# Patient Record
Sex: FEMALE | Race: BLACK OR AFRICAN AMERICAN | Employment: FULL TIME | URBAN - METROPOLITAN AREA
[De-identification: names, ages, dates, MRNs, and addresses within clinical notes are randomized per-mention and may not be internally consistent; named-entity substitution may affect disease eponyms.]

---

## 2019-05-16 ENCOUNTER — HOSPITAL ENCOUNTER (EMERGENCY)
Age: 44
Discharge: HOME | End: 2019-05-16
Attending: EMERGENCY MEDICINE
Payer: OTHER GOVERNMENT

## 2019-05-16 ENCOUNTER — APPOINTMENT (OUTPATIENT)
Dept: ULTRASOUND IMAGING | Age: 44
End: 2019-05-16
Attending: EMERGENCY MEDICINE
Payer: OTHER GOVERNMENT

## 2019-05-16 VITALS
HEART RATE: 69 BPM | RESPIRATION RATE: 14 BRPM | WEIGHT: 140 LBS | SYSTOLIC BLOOD PRESSURE: 126 MMHG | DIASTOLIC BLOOD PRESSURE: 83 MMHG | HEIGHT: 67 IN | OXYGEN SATURATION: 99 % | BODY MASS INDEX: 21.97 KG/M2 | TEMPERATURE: 98.4 F

## 2019-05-16 DIAGNOSIS — O03.9 MISCARRIAGE: Primary | ICD-10-CM

## 2019-05-16 LAB
ALBUMIN SERPL-MCNC: 3.8 G/DL (ref 3.5–5)
ALBUMIN/GLOB SERPL: 0.8 {RATIO} (ref 1.2–3.5)
ALP SERPL-CCNC: 66 U/L (ref 50–136)
ALT SERPL-CCNC: 15 U/L (ref 12–65)
ANION GAP SERPL CALC-SCNC: 6 MMOL/L (ref 7–16)
AST SERPL-CCNC: 11 U/L (ref 15–37)
BACTERIA URNS QL MICRO: 0 /HPF
BASOPHILS # BLD: 0.1 K/UL (ref 0–0.2)
BASOPHILS NFR BLD: 1 % (ref 0–2)
BILIRUB SERPL-MCNC: 0.3 MG/DL (ref 0.2–1.1)
BUN SERPL-MCNC: 12 MG/DL (ref 6–23)
CALCIUM SERPL-MCNC: 9 MG/DL (ref 8.3–10.4)
CASTS URNS QL MICRO: ABNORMAL /LPF
CHLORIDE SERPL-SCNC: 107 MMOL/L (ref 98–107)
CO2 SERPL-SCNC: 28 MMOL/L (ref 21–32)
CREAT SERPL-MCNC: 0.99 MG/DL (ref 0.6–1)
DIFFERENTIAL METHOD BLD: ABNORMAL
EOSINOPHIL # BLD: 0 K/UL (ref 0–0.8)
EOSINOPHIL NFR BLD: 1 % (ref 0.5–7.8)
EPI CELLS #/AREA URNS HPF: ABNORMAL /HPF
ERYTHROCYTE [DISTWIDTH] IN BLOOD BY AUTOMATED COUNT: 11.9 % (ref 11.9–14.6)
GLOBULIN SER CALC-MCNC: 4.5 G/DL (ref 2.3–3.5)
GLUCOSE SERPL-MCNC: 89 MG/DL (ref 65–100)
HCG SERPL-ACNC: 296 MIU/ML (ref 0–6)
HCT VFR BLD AUTO: 39.1 % (ref 35.8–46.3)
HGB BLD-MCNC: 12.8 G/DL (ref 11.7–15.4)
IMM GRANULOCYTES # BLD AUTO: 0 K/UL (ref 0–0.5)
IMM GRANULOCYTES NFR BLD AUTO: 1 % (ref 0–5)
LYMPHOCYTES # BLD: 1.1 K/UL (ref 0.5–4.6)
LYMPHOCYTES NFR BLD: 27 % (ref 13–44)
MCH RBC QN AUTO: 32.4 PG (ref 26.1–32.9)
MCHC RBC AUTO-ENTMCNC: 32.7 G/DL (ref 31.4–35)
MCV RBC AUTO: 99 FL (ref 79.6–97.8)
MONOCYTES # BLD: 0.3 K/UL (ref 0.1–1.3)
MONOCYTES NFR BLD: 6 % (ref 4–12)
NEUTS SEG # BLD: 2.7 K/UL (ref 1.7–8.2)
NEUTS SEG NFR BLD: 65 % (ref 43–78)
NRBC # BLD: 0 K/UL (ref 0–0.2)
PLATELET # BLD AUTO: 250 K/UL (ref 150–450)
PMV BLD AUTO: 10.5 FL (ref 9.4–12.3)
POTASSIUM SERPL-SCNC: 3.8 MMOL/L (ref 3.5–5.1)
PROT SERPL-MCNC: 8.3 G/DL (ref 6.3–8.2)
RBC # BLD AUTO: 3.95 M/UL (ref 4.05–5.2)
RBC #/AREA URNS HPF: >100 /HPF
SODIUM SERPL-SCNC: 141 MMOL/L (ref 136–145)
WBC # BLD AUTO: 4.2 K/UL (ref 4.3–11.1)
WBC URNS QL MICRO: ABNORMAL /HPF

## 2019-05-16 PROCEDURE — 76815 OB US LIMITED FETUS(S): CPT

## 2019-05-16 PROCEDURE — 80053 COMPREHEN METABOLIC PANEL: CPT

## 2019-05-16 PROCEDURE — 99283 EMERGENCY DEPT VISIT LOW MDM: CPT | Performed by: EMERGENCY MEDICINE

## 2019-05-16 PROCEDURE — 87086 URINE CULTURE/COLONY COUNT: CPT

## 2019-05-16 PROCEDURE — 85025 COMPLETE CBC W/AUTO DIFF WBC: CPT

## 2019-05-16 PROCEDURE — 81015 MICROSCOPIC EXAM OF URINE: CPT

## 2019-05-16 PROCEDURE — 84702 CHORIONIC GONADOTROPIN TEST: CPT

## 2019-05-16 NOTE — DISCHARGE INSTRUCTIONS
Patient Education        Miscarriage: Care Instructions  Your Care Instructions    The loss of a pregnancy can be very hard. You may wonder why it happened or blame yourself. Miscarriages are common and are not caused by exercise, stress, or sex. Most happen because the fertilized egg in the uterus does not develop normally. There is no treatment that can stop a miscarriage. As long as you do not have heavy blood loss, fever, weakness, or other signs of infection, you can let a miscarriage follow its own course. This can take several days. Your body will recover over the next several weeks. Having a miscarriage does not mean you cannot have a normal pregnancy in the future. The doctor has checked you carefully, but problems can develop later. If you notice any problems or new symptoms, get medical treatment right away. Follow-up care is a key part of your treatment and safety. Be sure to make and go to all appointments, and call your doctor if you are having problems. It's also a good idea to know your test results and keep a list of the medicines you take. How can you care for yourself at home? · You will probably have some vaginal bleeding for 1 to 2 weeks. It may be similar to or slightly heavier than a normal period. The bleeding should get lighter after a week. Use pads instead of tampons. You may use tampons during your next period, which should start in 3 to 6 weeks. · Take an over-the-counter pain medicine, such as acetaminophen (Tylenol), ibuprofen (Advil, Motrin), or naproxen (Aleve) for cramps. Read and follow all instructions on the label. You may have cramps for several days after the miscarriage. · Do not take two or more pain medicines at the same time unless the doctor told you to. Many pain medicines have acetaminophen, which is Tylenol. Too much acetaminophen (Tylenol) can be harmful. · Use a clear container to save any tissue that you pass.  Take it to your doctor's office as soon as you can.  · Do not have sex until the bleeding stops. · You may return to your normal activities if you feel well enough to do so. But you should avoid heavy exercise until the bleeding stops. · If you plan to get pregnant again, check with your doctor. Most doctors suggest waiting until you have had at least one normal period before you try to get pregnant. · If you do not want to get pregnant, ask your doctor about birth control. You can get pregnant again before your next period starts if you are not using birth control. · You may be low in iron because of blood loss. Eat a balanced diet that is high in iron and vitamin C. Foods rich in iron include red meat, shellfish, eggs, beans, and leafy green vegetables. Foods high in vitamin C include citrus fruits, tomatoes, and broccoli. Talk to your doctor about whether you need to take iron pills or a multivitamin. · The loss of a pregnancy can be very hard. You may wonder why it happened and blame yourself. Talking to family members, friends, a counselor, or your doctor may help you cope with your loss. When should you call for help? Call 911 anytime you think you may need emergency care. For example, call if:    · You passed out (lost consciousness).    Call your doctor now or seek immediate medical care if:    · You have severe vaginal bleeding.     · You are dizzy or lightheaded, or you feel like you may faint.     · You have new or worse pain in your belly or pelvis.     · You have a fever.     · You have vaginal discharge that smells bad.    Watch closely for changes in your health, and be sure to contact your doctor if:    · You do not get better as expected. Where can you learn more? Go to http://abimael-david.info/. Enter E802 in the search box to learn more about \"Miscarriage: Care Instructions. \"  Current as of: September 5, 2018  Content Version: 11.9  © 8182-9222 [employer].  Care instructions adapted under license by [employer] (which disclaims liability or warranty for this information). If you have questions about a medical condition or this instruction, always ask your healthcare professional. [address] any warranty or liability for your use of this information.

## 2019-05-16 NOTE — ED TRIAGE NOTES
Pt presents to triage c/o \"here to get scans done bc she is going through a miscarriage. May 10th she started having vaginal bleeding. Pt uses IVF process. Pt requests [facility] receive

## 2019-05-16 NOTE — ED TRIAGE NOTES
Patient states that she is currently getting IVF treatment. States that she had positive pregnancy test confirmed at [facility]. Patient has been bleed ing 5/10/19. Patient attempted to be seen at [facility] and was told to come to the ER as she is not an established patient. Patient states that she is not currently bleeding heavily. Patient denies CP/SOB.

## 2019-05-16 NOTE — ED PROVIDER NOTES
Patient presents the ER for vaginal bleeding and likely miscarriage. Patient reports she had Receive individual fertilization. Reports she began bleeding approximately 6 days ago. Was seen at outside facility. Does not have established OB/GYN in this area. Was sent to the ER for continued vaginal bleeding. The history is provided by the patient. Miscarriage This is a new problem. The current episode started more than 2 days ago. The problem occurs constantly. The pain is located in the suprapubic region. The pain is at a severity of 3/10. The pain is mild. Pertinent negatives include no frequency, no chest pain and no back pain. Past Medical History:  
Diagnosis Date  Autoimmune disease ([facility] [address] HIV positive  Endocrine disease   
 hyperthyroidism Past Surgical History:  
Procedure Laterality Date  HX GYN    
 c- section x 2 History reviewed. No pertinent family history. Social History Socioeconomic History  Marital status:  Spouse name: Not on file  Number of children: Not on file  Years of education: Not on file  Highest education level: Not on file Occupational History  Not on file Social Needs  Financial resource strain: Not on file  Food insecurity:  
  Worry: Not on file Inability: Not on file  Transportation needs:  
  Medical: Not on file Non-medical: Not on file Tobacco Use  Smoking status: Never Smoker  Smokeless tobacco: Never Used Substance and Sexual Activity  Alcohol use: Not Currently Comment: social  
 Drug use: Never  Sexual activity: Not on file Lifestyle  Physical activity:  
  Days per week: Not on file Minutes per session: Not on file  Stress: Not on file Relationships  Social connections:  
  Talks on phone: Not on file Gets together: Not on file Attends [employer] service: Not on file Active member of club or organization: Not on file Attends meetings of clubs or organizations: Not on file Relationship status: Not on file  Intimate partner violence:  
  Fear of current or ex partner: Not on file Emotionally abused: Not on file Physically abused: Not on file Forced sexual activity: Not on file Other Topics Concern  Not on file Social History Narrative  Not on file ALLERGIES: Patient has no known allergies. Review of Systems Constitutional: Negative for fatigue and unexpected weight change. HENT: Negative for congestion and dental problem. Eyes: Negative for photophobia and redness. Respiratory: Negative for chest tightness. Cardiovascular: Negative for chest pain. Gastrointestinal: Negative for abdominal pain. Genitourinary: Negative for frequency and urgency. Musculoskeletal: Negative for back pain and gait problem. Neurological: Negative for numbness. Hematological: Negative for adenopathy. Does not bruise/bleed easily. Psychiatric/Behavioral: Negative for confusion. All other systems reviewed and are negative. Vitals:  
 05/16/19 2305 BP: 123/86 Pulse: 72 Resp: 14 Temp: 98.4 °F (36.9 °C) SpO2: 100% Weight: 63.5 kg (140 lb) Height: 5' 7\" (1.702 m) Physical Exam  
Constitutional: She is oriented to person, place, and time. She appears well-developed and well-nourished. Cardiovascular: Normal rate and regular rhythm. Pulmonary/Chest: Effort normal and breath sounds normal.  
Abdominal: Soft. Bowel sounds are normal.  
Genitourinary: Musculoskeletal: Normal range of motion. She exhibits no edema or deformity. Neurological: She is alert and oriented to person, place, and time. Nursing note and vitals reviewed. MDM Number of Diagnoses or Management Options Diagnosis management comments: We'll obtain hCG, quantitative, as well as ultrasound Also, check hemoglobin and hematocrit 1:33 PM 
Labs fairly stable. Hemoglobin 12.8 HCG is 298. It was 408 when it was drawn one month ago Ultrasound performed showed Thickened endometrial stripe . No obvious signs of retained products of conception. Discussed the patient was also testing. We'll discharge. Encouraged close follow-up with GYN Amount and/or Complexity of Data Reviewed Clinical lab tests: ordered and reviewed Tests in the radiology section of CPT®: ordered and reviewed Risk of Complications, Morbidity, and/or Mortality Presenting problems: moderate Diagnostic procedures: low Management options: low Patient Progress Patient progress: stable Procedures Results Include: 
 
Recent Results (from the past 24 hour(s)) CBC WITH AUTOMATED DIFF Collection Time: 05/16/19  9:54 AM  
Result Value Ref Range WBC 4.2 (L) 4.3 - 11.1 K/uL  
 RBC 3.95 (L) 4.05 - 5.2 M/uL  
 HGB 12.8 11.7 - 15.4 g/dL HCT 39.1 35.8 - 46.3 % MCV 99.0 (H) 79.6 - 97.8 FL  
 MCH 32.4 26.1 - 32.9 PG  
 MCHC 32.7 31.4 - 35.0 g/dL  
 RDW 11.9 11.9 - 14.6 % PLATELET 679 716 - 500 K/uL MPV 10.5 9.4 - 12.3 FL ABSOLUTE NRBC 0.00 0.0 - 0.2 K/uL  
 DF AUTOMATED NEUTROPHILS 65 43 - 78 % LYMPHOCYTES 27 13 - 44 % MONOCYTES 6 4.0 - 12.0 % EOSINOPHILS 1 0.5 - 7.8 % BASOPHILS 1 0.0 - 2.0 % IMMATURE GRANULOCYTES 1 0.0 - 5.0 %  
 ABS. NEUTROPHILS 2.7 1.7 - 8.2 K/UL  
 ABS. LYMPHOCYTES 1.1 0.5 - 4.6 K/UL  
 ABS. MONOCYTES 0.3 0.1 - 1.3 K/UL  
 ABS. EOSINOPHILS 0.0 0.0 - 0.8 K/UL  
 ABS. BASOPHILS 0.1 0.0 - 0.2 K/UL  
 ABS. IMM. GRANS. 0.0 0.0 - 0.5 K/UL METABOLIC PANEL, COMPREHENSIVE Collection Time: 05/16/19  9:54 AM  
Result Value Ref Range Sodium 141 136 - 145 mmol/L Potassium 3.8 3.5 - 5.1 mmol/L Chloride 107 98 - 107 mmol/L  
 CO2 28 21 - 32 mmol/L Anion gap 6 (L) 7 - 16 mmol/L Glucose 89 65 - 100 mg/dL BUN 12 6 - 23 MG/DL Creatinine 0.99 0.6 - 1.0 MG/DL  
 GFR est AA >60 >60 ml/min/1.73m2 GFR est non-AA >60 >60 ml/min/1.73m2 Calcium 9.0 8.3 - 10.4 MG/DL Bilirubin, total 0.3 0.2 - 1.1 MG/DL  
 ALT (SGPT) 15 12 - 65 U/L  
 AST (SGOT) 11 (L) 15 - 37 U/L Alk. phosphatase 66 50 - 136 U/L Protein, total 8.3 (H) 6.3 - 8.2 g/dL Albumin 3.8 3.5 - 5.0 g/dL Globulin 4.5 (H) 2.3 - 3.5 g/dL A-G Ratio 0.8 (L) 1.2 - 3.5 BETA HCG, QT Collection Time: 05/16/19  9:54 AM  
Result Value Ref Range Beta HCG,  (H) 0.0 - 6.0 MIU/ML  
URINE MICROSCOPIC Collection Time: 05/16/19 11:37 AM  
Result Value Ref Range WBC 20-50 0 /hpf  
 RBC >100 (H) 0 /hpf Epithelial cells 0-3 0 /hpf Bacteria 0 0 /hpf Casts 0-3 0 /lpf Voice dictation software was used during the making of this note. This software is not perfect and grammatical and other typographical errors may be present. This note has been proofread, but may still contain errors.  
Sandy Haney MD; 5/16/2019 @1:34 PM  
===================================================================

## 2019-05-16 NOTE — ED NOTES
I have reviewed discharge instructions with the patient. The patient verbalized understanding. Patient left ED via Discharge Method: ambulatory to Home with self. Opportunity for questions and clarification provided. Patient given 0 scripts. No e-sign. To continue your aftercare when you leave the hospital, you may receive an automated call from our care team to check in on how you are doing. This is a free service and part of our promise to provide the best care and service to meet your aftercare needs.  If you have questions, or wish to unsubscribe from this service please call [phone]. Thank you for Choosing our [facility] Emergency Department.

## 2019-05-18 LAB
BACTERIA SPEC CULT: NORMAL
SERVICE CMNT-IMP: NORMAL

## 2020-08-26 ENCOUNTER — HOSPITAL ENCOUNTER (OUTPATIENT)
Dept: MAMMOGRAPHY | Age: 45
Discharge: HOME | End: 2020-08-26
Attending: OBSTETRICS & GYNECOLOGY
Payer: OTHER GOVERNMENT

## 2020-08-26 DIAGNOSIS — Z12.39 SCREENING FOR BREAST CANCER: ICD-10-CM

## 2020-08-26 PROCEDURE — 77063 BREAST TOMOSYNTHESIS BI: CPT

## 2021-10-16 ENCOUNTER — HOSPITAL ENCOUNTER (OUTPATIENT)
Dept: MAMMOGRAPHY | Age: 46
Discharge: HOME | End: 2021-10-16
Attending: OBSTETRICS & GYNECOLOGY
Payer: OTHER GOVERNMENT

## 2021-10-16 DIAGNOSIS — Z12.31 ENCOUNTER FOR SCREENING MAMMOGRAM FOR BREAST CANCER: ICD-10-CM

## 2021-10-16 PROCEDURE — 77063 BREAST TOMOSYNTHESIS BI: CPT

## 2022-06-06 ENCOUNTER — NURSE ONLY (OUTPATIENT)
Dept: OBGYN CLINIC | Age: 47
End: 2022-06-06

## 2022-06-06 VITALS — BODY MASS INDEX: 24.75 KG/M2 | WEIGHT: 158 LBS

## 2022-06-06 DIAGNOSIS — Z98.890 HISTORY OF MYOMECTOMY: ICD-10-CM

## 2022-06-06 DIAGNOSIS — O09.811 SUPERVISION OF PREGNANCY RESULTING FROM ASSISTED REPRODUCTIVE TECHNOLOGY IN FIRST TRIMESTER: Primary | ICD-10-CM

## 2022-06-06 DIAGNOSIS — B20 HIV INFECTION, UNSPECIFIED SYMPTOM STATUS (HCC): ICD-10-CM

## 2022-06-06 LAB
ABO + RH BLD: NORMAL
BLOOD GROUP ANTIBODIES SERPL: NORMAL

## 2022-06-06 RX ORDER — ASCORBIC ACID, CHOLECALCIFEROL, .ALPHA.-TOCOPHEROL ACETATE, DL-, PYRIDOXINE HYDROCHLORIDE, FOLIC ACID, CYANOCOBALAMIN, BIOTIN, CALCIUM CARBONATE, FERROUS ASPARTO GLYCINATE, IRON, POTASSIUM IODIDE, MAGNESIUM OXIDE, DOCONEXENT AND LOWBUSH BLUEBERRY 60; 1000; 10; 26; 400; 13; 280; 80; 9; 9; 150; 25; 350; 25; 600 MG/1; [IU]/1; [IU]/1; MG/1; UG/1; UG/1; UG/1; MG/1; MG/1; MG/1; UG/1; MG/1; MG/1; MG/1; UG/1
1 CAPSULE, GELATIN COATED ORAL DAILY
Qty: 30 CAPSULE | Refills: 11 | Status: SHIPPED | OUTPATIENT
Start: 2022-06-06 | End: 2022-06-09

## 2022-06-06 RX ORDER — PYRIDOXINE HCL (VITAMIN B6) 50 MG
50 TABLET ORAL DAILY
COMMUNITY
End: 2022-08-11

## 2022-06-06 NOTE — PROGRESS NOTES
NOB consult with pt. Labs today: prenatal panel. Offered pt the option of CF, SMA, and Fragile X carrier testing. Pt declines testing d/t donor egg. Offered pt the option of genetic screening (1st screen). Pt referred to [facility] for 1st Screen/consult for high risk pregnancy. Instructed pt on exercise/nutrition in pregnancy. Reviewed [facility] preg book. Advised pt on using [facility] for hospital needs and [facility] L&D for pregnancy related emergencies. Pt states understanding. NOB forms signed, scanned, and given to pt. Pt had embryo transfer with donor egg on 4/5/22. Records in [facility] through [address] [place] [address]. Medical Hx: HIV, hyperthyroid    Surgical Hx: c/s x2, lap tubal, tubal reversal, lap myomectomy    Last Pap: 9/27/21  WNL/neg HPV    Pt OB c/o: none. Pt is taking B6/Unisom and is working well.

## 2022-06-08 ENCOUNTER — ROUTINE PRENATAL (OUTPATIENT)
Dept: OBGYN CLINIC | Age: 47
End: 2022-06-08

## 2022-06-08 VITALS — BODY MASS INDEX: 24.75 KG/M2 | SYSTOLIC BLOOD PRESSURE: 124 MMHG | WEIGHT: 158 LBS | DIASTOLIC BLOOD PRESSURE: 82 MMHG

## 2022-06-08 DIAGNOSIS — O98.719 HIV AFFECTING PREGNANCY, ANTEPARTUM: ICD-10-CM

## 2022-06-08 DIAGNOSIS — Z11.51 SCREENING FOR HPV (HUMAN PAPILLOMAVIRUS): ICD-10-CM

## 2022-06-08 DIAGNOSIS — Z12.4 PAP SMEAR FOR CERVICAL CANCER SCREENING: Primary | ICD-10-CM

## 2022-06-08 DIAGNOSIS — O34.219 HISTORY OF CESAREAN SECTION COMPLICATING PREGNANCY: ICD-10-CM

## 2022-06-08 DIAGNOSIS — O99.011 ANEMIA AFFECTING PREGNANCY IN FIRST TRIMESTER: ICD-10-CM

## 2022-06-08 DIAGNOSIS — Z98.890 HISTORY OF MYOMECTOMY: ICD-10-CM

## 2022-06-08 DIAGNOSIS — O09.91 SUPERVISION OF HIGH RISK PREGNANCY IN FIRST TRIMESTER: ICD-10-CM

## 2022-06-08 DIAGNOSIS — O09.521 MULTIGRAVIDA OF ADVANCED MATERNAL AGE IN FIRST TRIMESTER: ICD-10-CM

## 2022-06-08 DIAGNOSIS — Z86.39 HISTORY OF THYROID DISEASE: ICD-10-CM

## 2022-06-08 PROBLEM — D64.9 ANEMIA: Status: ACTIVE | Noted: 2022-06-08

## 2022-06-08 PROBLEM — O09.90 SUPERVISION OF HIGH-RISK PREGNANCY: Status: ACTIVE | Noted: 2022-06-08

## 2022-06-08 PROBLEM — O09.529 ADVANCED MATERNAL AGE IN MULTIGRAVIDA: Status: ACTIVE | Noted: 2022-06-08

## 2022-06-08 LAB
BASOPHILS # BLD: 0 K/UL (ref 0–0.2)
BASOPHILS NFR BLD: 1 % (ref 0–2)
DIFFERENTIAL METHOD BLD: ABNORMAL
EOSINOPHIL # BLD: 0.1 K/UL (ref 0–0.8)
EOSINOPHIL NFR BLD: 1 % (ref 0.5–7.8)
ERYTHROCYTE [DISTWIDTH] IN BLOOD BY AUTOMATED COUNT: 16.7 % (ref 11.9–14.6)
HBV SURFACE AG SER QL: NONREACTIVE
HCT VFR BLD AUTO: 31.4 % (ref 35.8–46.3)
HGB BLD-MCNC: 9 G/DL (ref 11.7–15.4)
HIV 1+2 AB+HIV1 P24 AG SERPL QL IA: REACTIVE
HIV 1/2 RESULT COMMENT: ABNORMAL
IMM GRANULOCYTES # BLD AUTO: 0 K/UL (ref 0–0.5)
IMM GRANULOCYTES NFR BLD AUTO: 0 % (ref 0–5)
LYMPHOCYTES # BLD: 1.2 K/UL (ref 0.5–4.6)
LYMPHOCYTES NFR BLD: 30 % (ref 13–44)
MCH RBC QN AUTO: 22.3 PG (ref 26.1–32.9)
MCHC RBC AUTO-ENTMCNC: 28.7 G/DL (ref 31.4–35)
MCV RBC AUTO: 77.7 FL (ref 79.6–97.8)
MONOCYTES # BLD: 0.3 K/UL (ref 0.1–1.3)
MONOCYTES NFR BLD: 7 % (ref 4–12)
NEUTS SEG # BLD: 2.5 K/UL (ref 1.7–8.2)
NEUTS SEG NFR BLD: 61 % (ref 43–78)
NRBC # BLD: 0 K/UL (ref 0–0.2)
PLATELET # BLD AUTO: 367 K/UL (ref 150–450)
PMV BLD AUTO: 10.9 FL (ref 9.4–12.3)
RBC # BLD AUTO: 4.04 M/UL (ref 4.05–5.2)
RPR SER QL: NONREACTIVE
RUBV IGG SERPL IA-ACNC: 185 IU/ML (ref 0–50)
T4 FREE SERPL-MCNC: 0.9 NG/DL (ref 0.78–1.46)
TSH, 3RD GENERATION: 0.48 UIU/ML (ref 0.36–3.74)
VZV IGG SER IA-ACNC: 719 INDEX
WBC # BLD AUTO: 4.1 K/UL (ref 4.3–11.1)

## 2022-06-08 PROCEDURE — 99902 PR PRENATAL VISIT: CPT | Performed by: OBSTETRICS & GYNECOLOGY

## 2022-06-08 RX ORDER — DIPHENHYDRAMINE HCL 50 MG
1 CAPSULE ORAL DAILY
Qty: 30 CAPSULE | Refills: 3 | Status: SHIPPED | OUTPATIENT
Start: 2022-06-08 | End: 2022-06-09

## 2022-06-08 RX ORDER — DIPHENHYDRAMINE HYDROCHLORIDE 25 MG/1
25 CAPSULE ORAL 3 TIMES DAILY
Qty: 90 TABLET | Refills: 3 | Status: SHIPPED | OUTPATIENT
Start: 2022-06-08 | End: 2022-06-09

## 2022-06-08 RX ORDER — ASPIRIN 81 MG/1
162 TABLET ORAL DAILY
Qty: 60 TABLET | Refills: 6 | Status: SHIPPED | OUTPATIENT
Start: 2022-06-08 | End: 2022-08-03 | Stop reason: SDUPTHER

## 2022-06-08 NOTE — ASSESSMENT & PLAN NOTE
Hx of autoimmune thyroid dz  Previously on PTU, but has not required medications for years  TSH normal on IPV labs

## 2022-06-08 NOTE — ASSESSMENT & PLAN NOTE
[facility] myomectomy at outside facility in 2010   Has term repeat C/S following surgery so assuming not in contractile portion   Op note ___

## 2022-06-08 NOTE — ASSESSMENT & PLAN NOTE
Severe anemia noted on IPV   Hg 9.0, microcytic   Iron panel/electrophoresis today and start iron BID.  Referral to heme/onc for evaluation of IV iron

## 2022-06-08 NOTE — PROGRESS NOTES
CC: New OB exam    HPI:  41 y.o.  [id number] presents today for a New OB examination at [address] [place] [address]. Pt also with complaints of none.    + n/v--tolerating PO. On unisom/b6  No cramping/VB    Genetics: planning first screen. Donor egg. OB HISTORY:   OB History        4    Para   2    Term   2            AB   1    Living   2       SAB   1    IAB        Ectopic        Molar        Multiple        Live Births   2                Family history of obstetric issues, genetic abnormalities:  no       GYN HISTORY:  Last Pap:  2021 neg cotest   Hx of Abnl Paps: yes  History of STDs:  Yes, HIV      No flowsheet data found. No flowsheet data found. Past Medical History:   Diagnosis Date    HIV disease ([facility] [address]     Hyperthyroidism     No meds for several years         Past Surgical History:   Procedure Laterality Date     SECTION      x 2     LAP,TUBAL CAUTERY      MYOMECTOMY      laparoscopic    OTHER SURGICAL HISTORY      tubal reversal     WISDOM TOOTH EXTRACTION           Outpatient Encounter Medications as of 2022   Medication Sig Dispense Refill    aspirin EC 81 MG EC tablet Take 2 tablets by mouth daily 60 tablet 6    diphenhydrAMINE HCl, Sleep, (UNISOM SLEEPGELS) 50 MG CAPS Take 1 tablet by mouth daily 30 capsule 3    vitamin B-6 (PYRIDOXINE) 25 MG tablet Take 1 tablet by mouth 3 times daily 90 tablet 3    Ilwszi-CsXfz-LiYla-Meth-FA-DHA (PRENATE MINI) 18-0.6-0.4-350 MG CAPS Take 1 capsule by mouth daily 30 capsule 11    pyridoxine (B-6) 50 MG tablet Take 50 mg by mouth daily      Doxylamine Succinate, Sleep, (UNISOM PO) Take by mouth      abacavir-lamiVUDine (EPZICOM) 600-300 MG per tablet Take 1 tablet by mouth daily      darunavir (PREZISTA) 800 MG TABS tablet Take by mouth      ritonavir (NORVIR) 100 MG tablet Take by mouth 2 times daily (with meals)       No facility-administered encounter medications on file as of 2022.          No Known Allergies      Family History   Problem Relation Age of Onset    Prostate Cancer Paternal Grandfather     Diabetes Mother     No Known Problems Father     Alzheimer's Disease Maternal Grandmother     No Known Problems Maternal Grandfather     No Known Problems Paternal Grandmother     Breast Cancer Neg Hx     Colon Cancer Neg Hx     Hypertension Neg Hx          Social History     Socioeconomic History    Marital status:      Spouse name: None    Number of children: None    Years of education: None    Highest education level: None   Occupational History    None   Tobacco Use    Smoking status: Never Smoker    Smokeless tobacco: Never Used   Vaping Use    Vaping Use: Never used   Substance and Sexual Activity    Alcohol use: Not Currently    Drug use: Never    Sexual activity: Yes     Partners: Male   Other Topics Concern    None   Social History Narrative    GYN: 5/30/2019  Abnormal Pap Smears: yes  Cervical Procedures: no  STDs: yes  chlamydia  and HIV (through 1st son's biological father)       Social Determinants of Health     Financial Resource Strain:     Difficulty of Paying Living Expenses: Not on file   Food Insecurity:     Worried About Running Out of Food in the Last Year: Not on file    Tata of Food in the Last Year: Not on file   Transportation Needs:     Lack of Transportation (Medical): Not on file    Lack of Transportation (Non-Medical):  Not on file   Physical Activity:     Days of Exercise per Week: Not on file    Minutes of Exercise per Session: Not on file   Stress:     Feeling of Stress : Not on file   Social Connections:     Frequency of Communication with Friends and Family: Not on file    Frequency of Social Gatherings with Friends and Family: Not on file    Attends Jehovah's witness Services: Not on file    Active Member of Clubs or Organizations: Not on file    Attends Club or Organization Meetings: Not on file    Marital Status: Not on file   Intimate Partner Violence:     Fear of Current or Ex-Partner: Not on file    Emotionally Abused: Not on file    Physically Abused: Not on file    Sexually Abused: Not on file   Housing Stability:     Unable to Pay for Housing in the Last Year: Not on file    Number of [place] in the Last Year: Not on file    Unstable Housing in the Last Year: Not on file       ROS:  Review of Systems   Constitutional: Negative for chills, fever and weight loss. HENT: Negative for hearing loss. Eyes: Negative for blurred vision and double vision. Respiratory: Negative for cough, hemoptysis and shortness of breath. Cardiovascular: Negative for chest pain, palpitations and orthopnea. Gastrointestinal: Negative for abdominal pain, blood in stool, constipation, diarrhea, nausea and vomiting. Genitourinary: Negative for dysuria, frequency, hematuria and urgency. Musculoskeletal: Negative for falls, joint pain and myalgias. Skin: Negative for itching and rash. Neurological: Negative for headaches. Endo/Heme/Allergies: Does not bruise/bleed easily. Psychiatric/Behavioral: Negative for depression and suicidal ideas. The patient is not nervous/anxious. All other systems reviewed and are negative. PHYSICAL EXAM:  Blood pressure 124/82, weight 158 lb (71.7 kg). Constitutional: She appears well-developed and well-nourished. No distress. HENT:    Head: Normocephalic and atraumatic. Cardiovascular: Normal rate, regular rhythm and normal heart sounds. Exam reveals no gallop and no friction rub. No murmur heard. Pulmonary/Chest: Effort normal and breath sounds normal. No respiratory distress. She has no wheezes. She has no rales. Abdominal: Soft. Bowel sounds are normal. There is no tenderness. There is no rebound and no guarding. Gravid. Skin: She is not diaphoretic. Psychiatric: She has a normal mood and affect.  Her behavior is normal. Thought content normal. .    Pelvic:   External genitalia wnl, no lesions, rashes  Clitoris and urethra midline  Vagina pink, moist, well rugated  Cervix without lesion/masses, DC wnl    Breasts:   Symmetric, no lesions, masses, rashes, no abnl nipple Dc         ASSESSMENT/PLAN:   41 y.o., [id number], for New OB exam at 11w6d :     1) New OB:   - Cotesting today    -PN labs reviewed    -Rx unisom/b6, Asa    -Flu/COVID vaccine recommended    -RTO 4wks for CRISTY     Anemia  Severe anemia noted on IPV   Hg 9.0, microcytic   Iron panel/electrophoresis today and start iron BID. Referral to heme/onc for evaluation of IV iron     Supervision of high-risk pregnancy  Hx of autoimmune thyroid dz  Previously on PTU, but has not required medications for years  TSH normal on IPV labs     History of [place]omectomy  [facility] [place]    Has term repeat C/S following surgery so assuming not in contractile portion     History of  section complicating pregnancy  Hx of C/S x 2 at outside facility   1 Arrest  2 Elective repeat ()    HIV affecting pregnancy, antepartum   Dx in    Followed by  ID  Current regimen updated in chart  Referral to MFM pending  Last viral load non-detectable. Has FU in July with ID    Plan for ZDV during labor. Cannot breastfeed. Advanced maternal age in multigravida  Donor egg.  Embryos were not genetically tested   Asa 162 qhs   Planning first screen with [address] DO

## 2022-06-08 NOTE — ASSESSMENT & PLAN NOTE
Dx in 2003   Followed by  ID  Current regimen updated in chart  Referral to M pending  Last viral load non-detectable. Has FU in July with ID    Plan for ZDV during labor. Cannot breastfeed.

## 2022-06-09 ENCOUNTER — OFFICE VISIT (OUTPATIENT)
Dept: OCCUPATIONAL MEDICINE | Age: 47
End: 2022-06-09

## 2022-06-09 VITALS
HEART RATE: 88 BPM | RESPIRATION RATE: 16 BRPM | TEMPERATURE: 99.6 F | DIASTOLIC BLOOD PRESSURE: 80 MMHG | SYSTOLIC BLOOD PRESSURE: 120 MMHG | OXYGEN SATURATION: 99 %

## 2022-06-09 DIAGNOSIS — J02.9 SORE THROAT: ICD-10-CM

## 2022-06-09 DIAGNOSIS — Z11.51 SCREENING FOR HPV (HUMAN PAPILLOMAVIRUS): ICD-10-CM

## 2022-06-09 DIAGNOSIS — U07.1 COVID: Primary | ICD-10-CM

## 2022-06-09 DIAGNOSIS — Z12.4 PAP SMEAR FOR CERVICAL CANCER SCREENING: ICD-10-CM

## 2022-06-09 PROBLEM — R01.1 HEART MURMUR: Status: ACTIVE | Noted: 2018-12-27

## 2022-06-09 PROBLEM — N89.8 VAGINAL DISCHARGE: Status: ACTIVE | Noted: 2019-02-28

## 2022-06-09 PROBLEM — D64.9 ANEMIA: Status: ACTIVE | Noted: 2018-03-28

## 2022-06-09 PROBLEM — B37.9 INFECTION DUE TO YEAST: Status: ACTIVE | Noted: 2019-08-30

## 2022-06-09 PROBLEM — M54.50 ACUTE LEFT-SIDED LOW BACK PAIN WITHOUT SCIATICA: Status: ACTIVE | Noted: 2021-05-19

## 2022-06-09 PROBLEM — H26.9 CORTICAL CATARACT OF BOTH EYES: Status: ACTIVE | Noted: 2022-04-07

## 2022-06-09 PROBLEM — H52.03 HYPEROPIA OF BOTH EYES: Status: ACTIVE | Noted: 2021-02-26

## 2022-06-09 LAB
BACTERIA SPEC CULT: NORMAL
GROUP A STREP ANTIGEN, POC: NEGATIVE
HGB A MFR BLD: 98 % (ref 96.4–98.8)
HGB A2 MFR BLD COLUMN CHROM: 2 % (ref 1.8–3.2)
HGB F MFR BLD: 0 % (ref 0–2)
HGB FRACT BLD-IMP: NORMAL
HGB S MFR BLD: 0 %
SARS-COV-2, POC: DETECTED
SERVICE CMNT-IMP: NORMAL
VALID INTERNAL CONTROL, POC: YES

## 2022-06-09 PROCEDURE — 99213 OFFICE O/P EST LOW 20 MIN: CPT | Performed by: NURSE PRACTITIONER

## 2022-06-09 PROCEDURE — 87426 SARSCOV CORONAVIRUS AG IA: CPT | Performed by: NURSE PRACTITIONER

## 2022-06-09 PROCEDURE — 87880 STREP A ASSAY W/OPTIC: CPT | Performed by: NURSE PRACTITIONER

## 2022-06-09 RX ORDER — FERROUS SULFATE 325(65) MG
325 TABLET ORAL 2 TIMES DAILY
Qty: 60 TABLET | Refills: 5 | Status: SHIPPED | OUTPATIENT
Start: 2022-06-09 | End: 2022-09-27

## 2022-06-09 ASSESSMENT — ENCOUNTER SYMPTOMS
EYE REDNESS: 0
EYE PAIN: 0
SINUS PAIN: 0
VOICE CHANGE: 0
NAUSEA: 0
CHANGE IN BOWEL HABIT: 0
COUGH: 0
VISUAL CHANGE: 0
SORE THROAT: 1
SWOLLEN GLANDS: 0
EYE ITCHING: 0
EYE DISCHARGE: 0
VOMITING: 0
RHINORRHEA: 0
SINUS PRESSURE: 0
ABDOMINAL PAIN: 0
TROUBLE SWALLOWING: 0

## 2022-06-09 NOTE — TELEPHONE ENCOUNTER
VM received from pt stating Fe rx not at pharmacy. Rx sent. Pt also had + COVID19 test at work today. Advised pt to go to ER for SOB and/or trouble breathing. [employer]che[employer] message sent to pt with OTC treatment. Pt states understanding.

## 2022-06-09 NOTE — PROGRESS NOTES
PROGRESS NOTE    SUBJECTIVE:   Robyn Encarnacion is a 41 y.o. female seen for scratchy throat that started yesterday. Denies any known or suspected sick contacts. Chief Complaint     Pharyngitis          Pharyngitis  This is a new problem. The current episode started yesterday. The problem has been unchanged. Associated symptoms include a sore throat. Pertinent negatives include no abdominal pain, anorexia, arthralgias, change in bowel habit, chest pain, chills, congestion, coughing, diaphoresis, fatigue, fever, headaches, joint swelling, myalgias, nausea, neck pain, numbness, rash, swollen glands, urinary symptoms, vertigo, visual change, vomiting or weakness. Nothing aggravates the symptoms. She has tried nothing for the symptoms. Current Outpatient Medications   Medication Sig Dispense Refill    Prenatal Vit-Fe Fumarate-FA (PRENATAL VITAMINS PO) prenatal vit 10-iron-folic-dha   1 tab daily      Ferrous Sulfate (IRON PO) Take 27 mg by mouth      aspirin EC 81 MG EC tablet Take 2 tablets by mouth daily 60 tablet 6    pyridoxine (B-6) 50 MG tablet Take 50 mg by mouth daily      Doxylamine Succinate, Sleep, (UNISOM PO) Take by mouth      abacavir-lamiVUDine (EPZICOM) 600-300 MG per tablet Take 1 tablet by mouth daily      darunavir (PREZISTA) 800 MG TABS tablet Take by mouth      ritonavir (NORVIR) 100 MG tablet Take by mouth 2 times daily (with meals)       No current facility-administered medications for this visit. No Known Allergies    Social History     Tobacco Use    Smoking status: Never Smoker    Smokeless tobacco: Never Used   Vaping Use    Vaping Use: Never used   Substance Use Topics    Alcohol use: Not Currently    Drug use: Never        Review of Systems   Constitutional: Negative for chills, diaphoresis, fatigue and fever. HENT: Positive for postnasal drip and sore throat.  Negative for congestion, drooling, rhinorrhea, sinus pressure, sinus pain, sneezing, trouble swallowing and voice change. Eyes: Negative for pain, discharge, redness and itching. Respiratory: Negative for cough. Cardiovascular: Negative for chest pain. Gastrointestinal: Negative for abdominal pain, anorexia, change in bowel habit, nausea and vomiting. Musculoskeletal: Negative for arthralgias, joint swelling, myalgias and neck pain. Skin: Negative for rash. Allergic/Immunologic: Positive for environmental allergies. Neurological: Negative for dizziness, vertigo, weakness, light-headedness, numbness and headaches. OBJECTIVE:  /80 (Site: Left Upper Arm, Position: Sitting, Cuff Size: Medium Adult)   Pulse 88   Temp 99.6 °F (37.6 °C) (Oral)   Resp 16   SpO2 99%      Results for orders placed or performed in visit on 06/09/22   AMB POC RAPID STREP A   Result Value Ref Range    VALID INTERNAL CONTROL, POC yes     Group A Strep Antigen, POC Negative Negative   AMB POC SARS-COV-2   Result Value Ref Range    SARS-COV-2, POC Detected (A) Not Detected       Physical Exam  Vitals reviewed. Constitutional:       General: She is awake. She is not in acute distress. Appearance: Normal appearance. She is well-developed and well-groomed. HENT:      Head: Normocephalic. Right Ear: Hearing, ear canal and external ear normal. No drainage, swelling or tenderness. A middle ear effusion is present. Left Ear: Hearing, ear canal and external ear normal. No drainage, swelling or tenderness. A middle ear effusion is present. Nose: Mucosal edema and rhinorrhea present. Rhinorrhea is clear. Right Turbinates: Swollen and pale. Left Turbinates: Swollen and pale. Right Sinus: No maxillary sinus tenderness or frontal sinus tenderness. Left Sinus: No maxillary sinus tenderness or frontal sinus tenderness. Mouth/Throat:      Mouth: Mucous membranes are moist.      Pharynx: Oropharynx is clear. Uvula midline. No pharyngeal swelling or posterior oropharyngeal erythema. Tonsils: No tonsillar exudate. Comments: PND with cobblestoning  Eyes:      General: Lids are normal. No allergic shiner. Conjunctiva/sclera: Conjunctivae normal.      Right eye: Right conjunctiva is not injected. No chemosis. Left eye: Left conjunctiva is not injected. No chemosis. Neck:      Thyroid: No thyromegaly. Trachea: Trachea normal.   Cardiovascular:      Rate and Rhythm: Normal rate and regular rhythm. Heart sounds: Normal heart sounds. Pulmonary:      Effort: Pulmonary effort is normal.      Breath sounds: Normal breath sounds. Musculoskeletal:      Cervical back: Normal range of motion and neck supple. Lymphadenopathy:      Head:      Right side of head: No submental, submandibular, tonsillar, preauricular, posterior auricular or occipital adenopathy. Left side of head: No submental, submandibular, tonsillar, preauricular, posterior auricular or occipital adenopathy. Skin:     General: Skin is warm and dry. Neurological:      Mental Status: She is alert and oriented to person, place, and time. Psychiatric:         Behavior: Behavior is cooperative. ASSESSMENT and PLAN    Indy was seen today for pharyngitis. Diagnoses and all orders for this visit:    COVID  Comments:  COVID positive; Instructed to contact her OB/GYN ASAP to update so they can provide further guidance since patient is pregnant and immunicompromised    Sore throat  Comments:  COVID positive, strep negative; Encouraged rest, hydration, warm salt water gargles, and OTC tylenol per packaging for relief.  Update OB/GYN ASAP for guidance  Orders:  -     AMB POC RAPID STREP A  -     AMB POC SARS-COV-2    Discussed that HR notification of result is necessary for medical surveillance during a pandemic state  in order to complete proper disinfection techniques and contact tracing processes, pt verbalized understanding    Counseled on benefits of having a primary care provider which includes, but is not limited to, continuity of care and having a medical home when concerns arise. Also enforced that onsite clinic policy states that we are not to take the place of a primary care provider, pt verbalized understanding. SEs and risk vs benefits associated with medications prescribed discussed with patient who verbalized understanding. Pt verbalized understanding and agreement with plan of care. RTC for persisting/worsening symptoms or new complaints that arise. Discussed signs and symptoms that would warrant immediate evaluation including, but not limited to HA, blurred vision, speech disturbance, difficulty with ambulation/gait, numbness, tingling, weakness, syncope, chest pain, or shortness of breath. I have reviewed the patient's medication list, past medical, family, social, and surgical history in detail and updated the patient record appropriately.     Bal Francisco, APRN - CNP

## 2022-06-09 NOTE — PATIENT INSTRUCTIONS
Patient Education        Learning About Coronavirus [phone]  What is coronavirus (COVID-19)? COVID-19 is a disease caused by a type of coronavirus. This illness was firstfound in December 2019. It has since spread worldwide. Coronaviruses are a large group of viruses. They cause the common cold. They also cause more serious illnesses like [place] respiratory syndrome (MERS) and severe acute respiratory syndrome (SARS). COVID-19 is caused by a novelcoronavirus. That means it's a new type that has not been seen in people before. What are the symptoms? COVID-19 symptoms may include:   Fever.  Cough.  Trouble breathing.  Chills or repeated shaking with chills.  Muscle and body aches.  Headache.  Sore throat.  New loss of taste or smell.  Vomiting.  Diarrhea. In severe cases, COVID-19 can cause pneumonia and make it hard to breathewithout help from a machine. It can cause death. How is it diagnosed? COVID-19 is diagnosed with a viral test. This may also be called a PCR test or antigen test. It looks for evidence of the virus in your breathing passages orlungs (respiratory system). The test is most often done on a sample from the nose, throat, or lungs. It's sometimes done on a sample of saliva. One way a sample is collected is byputting a long swab into the back of your nose. If you have questions about COVID-19 testing, ask your doctor or go to cdc.govto use the COVID-19 Viral Testing Tool. How is it treated? Mild cases of COVID-19 can be treated at home. Serious cases need treatment in the hospital. Treatment may include medicines to reduce symptoms, plus breathing support such as oxygen therapy or a ventilator. Some people may beplaced on their belly to help their oxygen levels. Treatments that may help people who have COVID-19 include:  Antiviral medicines. These medicines treat viral infections. Immune-based therapy. These medicines help the immune system fight COVID-19. Examples include monoclonal antibodies. Blood thinners. These medicines help prevent blood clots. People with severe illness are at risk for blood clots. How can you protect yourself and others?  Get vaccinated and boosted.  Avoid sick people and stay away from others if you are sick.  Stay at least 6 feet away from other people.  Avoid crowds, especially inside.  Get tested for COVID-19 before you have an indoor visit with people that don't live with you.  Cover your mouth with a tissue when you cough or sneeze.  Wash your hands often, especially after you cough or sneeze. Use soap and water, and scrub for at least 20 seconds. If soap and water aren't available, use an alcohol-based hand .  Avoid touching your mouth, nose, and eyes. Be sure to follow all instructions from the [facility] and your local health authorities. Here are some examples of specific precautions you may need totake.  If you are not fully vaccinated and boosted:  ? Wear a mask if you have to go to public areas.  Even if you're fully vaccinated and boosted, there's still a chance you can get and spread COVID-19. If you live in an area where COVID-19 is spreading quickly, wear a mask if you have to go to indoor public areas. You might also want to wear a mask in crowded outdoor areas as well as public indoor spaces if you:  ? Have certain health conditions. ? Live with someone who has a compromised immune system. ? Live with someone who is not fully vaccinated.  If you have been exposed to the virus and are not fully vaccinated and boosted:  ? Talk to your doctor as soon as you can. Your doctor might have you take medicine to help prevent serious illness. ? Get a COVID-19 test. You may need to be tested more than once. ? Stay home. Try to separate from other people where you live. Don't go to school, work, or public areas. ? Wear a mask around other people for a full 10 days.  Avoid travel and stay away from people at high risk for serious illness. ? Watch for symptoms.  If you have been exposed and you are fully vaccinated and boosted:  ? Talk to your doctor as soon as you can. Your doctor may have you take medicine to help prevent serious illness. ? Get a COVID-19 test. You may need to be tested more than once. ? Wear a mask around other people for a full 10 days. Avoid travel and stay away from people at high risk for serious illness. ? Watch for symptoms. If you're sick or test positive for COVID-19:   Talk to your doctor as soon as you can. Your doctor may have you take medicine to help prevent serious illness.  Get a COVID-19 test unless you have already been tested. You may need to be tested more than once.  Stay home. Leave only if you need to get medical care.  Wear a mask whenever you're around other people for a full 10 days.  Avoid travel and stay away from people at high risk for serious illness.  Limit contact with pets and people in your home. If possible, stay in a separate bedroom and use a separate bathroom.  Clean and disinfect your home every day. Use household  and disinfectant wipes or sprays. Take special care to clean things that you touch with your hands. If you have questions about COVID-19 testing, ask your doctor or go to cdc.govto use the COVID-19 Viral Testing Tool. How can you self-isolate when you have COVID-19? If you have COVID-19, there are things you can do to help avoid spreading thevirus to others.  Limit contact with people in your home. If possible, stay in a separate bedroom and use a separate bathroom.  Wear a mask when you are around other people.  If you have to leave home, avoid crowds and try to stay at least 6 feet away from other people.  Avoid contact with pets and other animals.  Cover your mouth and nose with a tissue when you cough or sneeze. Then throw it in the trash right away.    Wash your hands often, especially after you cough or sneeze. Use soap and water, and scrub for at least 20 seconds. If soap and water aren't available, use an alcohol-based hand .  Don't share personal household items. These include bedding, towels, cups and glasses, and eating utensils. [address] in the warmest water allowed for the fabric type, and dry it completely. It's okay to wash other people's laundry with yours.  Clean and disinfect your home. Use household  and disinfectant wipes or sprays. When should you call for help? Call 911 anytime you think you may need emergency care. For example, call if you have life-threatening symptoms, such as:     You have severe trouble breathing. (You can't talk at all.)      You have constant chest pain or pressure.      You are severely dizzy or lightheaded.      You are confused or can't think clearly.      You have pale, gray, or blue-colored skin or lips.      You pass out (lose consciousness) or are very hard to wake up.      You have loss of balance or trouble walking.      You have trouble seeing out of one or both eyes.      You have weakness or drooping on one side of the face.      You have weakness or numbness in an arm or a leg.      You have trouble speaking.      You have a severe headache.      You have a seizure. Call your doctor now or seek immediate medical care if:     You have moderate trouble breathing. (You can't speak a full sentence.)      You are coughing up blood.      You have signs of low blood pressure. These include feeling lightheaded; being too weak to stand; and having cold, pale, clammy skin. Watch closely for changes in your health, and be sure to contact your doctor if:     Your symptoms get worse.      You are not getting better as expected.      You have new or worse symptoms of anxiety, depression, nightmares, or flashbacks. Call before you go to the doctor's office. Follow their instructions. And wear a mask.   Where can you learn more?  Go to [email or web address] and sign in to your [employer] account. Enter C008 in the [facility] box to learn more about \"Learning About Coronavirus (COVID-19). \"     If you do not have an account, please click on the \"Sign Up Now\" link. Current as of: July 1, 2021               Content Version: 13.2  © 2006-2022 [employer]. Care instructions adapted under license by [facility] ([facility]). If you have questions about a medical condition or this instruction, always ask your healthcare professional. [place] [address] any warranty or liability for your use of this information. Patient Education        Learning About Contact Tracing  What is contact tracing? Contact tracing is a process that public health departments use to fight the spread of infectious diseases. These include COVID-19 (coronavirus), measles,and others. A health department worker reaches out to a person who has tested positive for the disease. Then the worker calls other people who may have been exposed. Depending on the disease, the contact may have happened through close contact, by eating at the same place, or through sex. The contacts are told that they have been exposed. The worker can then guide the contacts on what to do next. This may include seeing a doctor, getting tested, or staying quarantined athome for a while. Information about the person and their contacts is kept private. It's used only to help stop the spread of the disease. If you have any concerns about privacy,talk to the health department worker. Why is it done? Contact tracing helps reduce the risk of spreading disease among your friends,family, and community. A person who tests positive for a disease can expose other people to it. Each of these people in turn can expose more people in an ever-widening [place]. This raises the risk of more people getting sick.  But a call from the health department can help both the person and their contacts take action so they don't spread the disease to others. Then the risk of illness and [facility] goes down. How is it done? Contact tracing usually starts with a phone call. A health department worker calls you to say that you've tested positive for a disease or that you've beenin close contact with someone who has. If you test positive for a disease  The caller will ask what symptoms, if any, you have. You may be asked about any health conditions that may put you at higher risk for serious illness. You'll be urged to follow local health department safety instructions. You may beasked to isolate. The caller will ask where you've been recently. They'll ask for the names and phone numbers of anyone you've had close contact with. These people will also be contacted. And the caller will contact any businesses or event venues youvisited. Your name will not be given out unless you say it's okay. If your contacts get early warning that they may have the disease, they can follow the safety instructions sooner. They may be less likely to pass thedisease to their own friends and family. If you are a close contact  If you were in close contact with someone who has the disease, the caller will urge you to follow local health department instructions. You may be asked toquarantine. Ask your doctor when it's safe to end your quarantine. The caller will give you information about the disease and urge you to watch for any symptoms. You may be advised to get treatment. Follow the caller'sinstructions. You may be asked about other people you've come in contact with. Where can you learn more? Go to [email or web address] and sign in to your [employer] account. Enter A132 in the [employer] box to learn more about \"Learning About Contact Tracing. \"     If you do not have an account, please click on the \"Sign Up Now\" link.   Current as of: July 1, 2021               Content Version: 13.2  © 4364-6114 [employer]. Care instructions adapted under license by [facility] ([facility]). If you have questions about a medical condition or this instruction, always ask your healthcare professional. [address] any warranty or liability for your use of this information. [place]

## 2022-06-10 ENCOUNTER — TELEPHONE (OUTPATIENT)
Dept: OBGYN CLINIC | Age: 47
End: 2022-06-10

## 2022-06-10 NOTE — TELEPHONE ENCOUNTER
Call from pt stating she was unable to see pt message yesterday. Instructed on how to [facility]. Pt states understanding and is successful. Pt c/o temp of 101 with Tylenol, headache, and congestion. Advised pt to go to ER if temp 102 or greater with Tylenol treatment. Advised on humidifier, saline nasal spray, Vicks vapor rub, and plain Mucinex. Pt states understanding on all.

## 2022-06-16 ENCOUNTER — ROUTINE PRENATAL (OUTPATIENT)
Dept: OBGYN CLINIC | Age: 47
End: 2022-06-16
Payer: OTHER GOVERNMENT

## 2022-06-16 VITALS — OXYGEN SATURATION: 100 % | SYSTOLIC BLOOD PRESSURE: 127 MMHG | HEART RATE: 85 BPM | DIASTOLIC BLOOD PRESSURE: 88 MMHG

## 2022-06-16 DIAGNOSIS — O98.711 HIV DISEASE AFFECTING PREGNANCY IN FIRST TRIMESTER: ICD-10-CM

## 2022-06-16 DIAGNOSIS — O09.91 SUPERVISION OF HIGH RISK PREGNANCY IN FIRST TRIMESTER: ICD-10-CM

## 2022-06-16 DIAGNOSIS — Z86.39 HISTORY OF THYROID DISEASE: ICD-10-CM

## 2022-06-16 DIAGNOSIS — I44.0 HEART BLOCK AV FIRST DEGREE: Primary | ICD-10-CM

## 2022-06-16 DIAGNOSIS — O98.511 COVID-19 AFFECTING PREGNANCY IN FIRST TRIMESTER: ICD-10-CM

## 2022-06-16 DIAGNOSIS — D50.8 OTHER IRON DEFICIENCY ANEMIA: ICD-10-CM

## 2022-06-16 DIAGNOSIS — D25.9 UTERINE FIBROID IN PREGNANCY: ICD-10-CM

## 2022-06-16 DIAGNOSIS — Z3A.13 13 WEEKS GESTATION OF PREGNANCY: ICD-10-CM

## 2022-06-16 DIAGNOSIS — R01.1 HEART MURMUR: ICD-10-CM

## 2022-06-16 DIAGNOSIS — U07.1 COVID-19 AFFECTING PREGNANCY IN FIRST TRIMESTER: ICD-10-CM

## 2022-06-16 DIAGNOSIS — Z98.890 HISTORY OF MYOMECTOMY: ICD-10-CM

## 2022-06-16 DIAGNOSIS — N84.0 ENDOMETRIAL POLYP: ICD-10-CM

## 2022-06-16 DIAGNOSIS — O34.10 UTERINE FIBROID IN PREGNANCY: ICD-10-CM

## 2022-06-16 DIAGNOSIS — O34.219 HISTORY OF CESAREAN SECTION COMPLICATING PREGNANCY: ICD-10-CM

## 2022-06-16 DIAGNOSIS — O09.521 MULTIGRAVIDA OF ADVANCED MATERNAL AGE IN FIRST TRIMESTER: ICD-10-CM

## 2022-06-16 LAB
C TRACH RRNA CVX QL NAA+PROBE: NEGATIVE
CYTOLOGIST CVX/VAG CYTO: NORMAL
CYTOLOGY CVX/VAG DOC THIN PREP: NORMAL
HPV APTIMA: NEGATIVE
Lab: NORMAL
Lab: NORMAL
N GONORRHOEA RRNA CVX QL NAA+PROBE: NEGATIVE
PATH REPORT.FINAL DX SPEC: NORMAL
STAT OF ADQ CVX/VAG CYTO-IMP: NORMAL
T VAGINALIS RRNA SPEC QL NAA+PROBE: NEGATIVE

## 2022-06-16 PROCEDURE — 76801 OB US < 14 WKS SINGLE FETUS: CPT | Performed by: OBSTETRICS & GYNECOLOGY

## 2022-06-16 PROCEDURE — 99205 OFFICE O/P NEW HI 60 MIN: CPT | Performed by: OBSTETRICS & GYNECOLOGY

## 2022-06-16 PROCEDURE — 76813 OB US NUCHAL MEAS 1 GEST: CPT | Performed by: OBSTETRICS & GYNECOLOGY

## 2022-06-16 NOTE — PROGRESS NOTES
MFM Consultation    Magdiel Patterson (: 1975) is a 41 y.o. [id number] at 13w0d with 2022, by Ultrasound. Presents for evaluation of the following chief complaint(s):  Ultrasound and High Risk Pregnancy (AMA, HIV, Anemia, C/S X2, fibroids, +covid in pregnancy )       Patient is working full time with [employer]. Scheduled to see Primary OB [facility]) on . Reports mild headaches that are relieved with Tylenol, no edema. Denies preeclamptic symptoms. Occasional flutter felt. No bleeding, LOF, cramping, ctxs, or vaginal pressure. Has nausea which has been relieved with B6 and Unisom. Review of Systems - per HPI; otherwise unremarkable. History reviewed and updated as needed. Exam:   Vitals:    22 1345   BP: 127/88   Pulse: 85   SpO2: 100%       Ultrasound: Please see formal ultrasound report under imaging tab. ASSESSMENT/PLAN:  Patient Active Problem List    Diagnosis Date Noted    Supervision of high-risk pregnancy 2022     Priority: High     Conceived via IVF with donor age Linda Villa)  Plans for first screen with MFM  Asa 162 mg qhs       22 at [facility]: Normal NT 1.4 mm and nasal bone. IVF Donor Egg from 21year old, low risk of aneuploidy. Genetic counseling done by MD.  Declines Genetic Testing. Patient with multiple Medical Conditions that complicate this pregnancy. See each individual Diagnosis Overview for details. Talked with patient about each diagnosis and plan. Patient has excellent knowledge of her conditions. She agrees with POC. Summary of Current Plan  1) History of Myomectomy-Hysteroscopic removal. Multiple small fibroids noted. Increase vascularity see at lower segment. Will follow for PAS with each US. 2) HIV Positive, long standing condition- On HIIT Therapy, last Quant undetectable. Defer Management to ID at [facility]. Will let Neonatologists know prior to delivery. 3) Anemia-Severe Anemia, not tolerating po Fe, N/V.   Needs IV Fe in and heavy menses; however trying to get pregnant  [] chronic  - monitor H/H    06/16/22 at [facility]: several small fibroids seen on ultrasound today       HIV disease affecting pregnancy in first trimester 05/27/2014     Priority: Medium     Dx in 2003   Followed by  ID  Current regimen updated in chart  Referral to MFM pending  Last viral load non-detectable. Has FU in July with ID    Plan for ZDV during labor. Cannot breastfeed. Formatting of this note might be different from the original.  2014-09 cd4= 622 (36%)  hiv rna =nd  ----------  2004-02 heterosexual exposure with subsequent pharygitis; followed by recurrent vaginal yeast infections abnormal pap  2004-09 Diagnosis 2nd to recurrent vaginal yeast infections. 2004-10 jesscia Hernández; cd4= 224, hiv rna = [id number]  2005-03 epzicom, reyataz (unboosted) [hyperpigmentation 2nd to [address]  2005: tx w/ trizivir/ norvir reyataz when pregnant  2006: epzicom, reyataz (unboosted) >> hiv rna = undetected and cd4 = 300-400  2009-02: formal safe sex/HIV transmission partner counseling by provider  2011-09 [facility] [place] >> epzicom, rezista, norvir bc of 1st degree heart block  2012-08 hiv rna <20 cd4= 362  2014-05 [facility] 1st visit HX: Diagnosed in 2003 and was started on treatment very soon; Antiretroviral history = has been treated with truvada but this causes skin discoloration; norvir = gel cap = dificult to swallow; reyataz = but had more medications at the time and then was changed to simplify regimen.   Exposure  to infected male- father of her son; now  since 2006;  (hiv-) is tested regularly; HIV RNA = not detected    Last Assessment & Plan:     Update: reports no difficulty with medication access; no side effects; no intercurrent illness or new side effects;   [] chronic HIV infection with viral suppression  -- monitor labs for efficacy and toxicity of antiviral therapy  -- refill darunavir, norvir, truvada  -- rtn in 4-6m  Formatting of this note might be different from the original.  On Triumeq  6/2/17 - HIV RNA by PCR <20, CD4 523, Hgb 9.0, WBC 3.5, AST 13, ALT 12, Cr 0.84    Last Assessment & Plan:   Formatting of this note might be different from the original.  She has been following up with infectious disease at [facility] . She was diagnosed with HIV in 2003. She has not had any complications from it and has never had any hospitalizations due to the HIV. She is compliant with her medical regimen. Formatting of this note might be different from the original.  Overview: Followed at [facility] by Dr Lynnette Duron on Epzicom (abacavir , lamuvidine) , prezista , norvir  Formatting of this note might be different from the original.  Followed at [facility] by Dr Lynnette Duron on Epzicom (abacavir , lamuvidine) , prezista , norvir    06/16/22 at [facility]: Patient was diagnosed with HIV in 2003. Managed with [employer] every 6 months. Next appointment is in July. Last appointment viral load per patient was undetectable. Taking Epzicom, Prezista, and Norvir daily. · Would not change any treatment, watch for FGR with serial US.  Anemia, iron deficiency 02/27/2014     Priority: Medium     Formatting of this note might be different from the original.  2009 - noted to be Fe deficient w/ anemia, reporting menorrhagia  2014-02 fe sat = 6% hgb = 8.9  2014-05 h/h = 13/41- resolved    Last Assessment & Plan:   Formatting of this note might be different from the original.  History: has had difficulty with anemia 2nd to heavy menses. Is not taking Fe at this time. [] chronic, untreated  -- will discuss need for Fe at next visit  -- check Fe panel at next visit  Formatting of this note might be different from the original.  Overview:   2009 - noted to be Fe deficient w/ anemia, reporting menorrhagia  2014-02 fe sat = 6% hgb = 8.9  2014-05 h/h = 13/41- resolved    Last Assessment & Plan:   History: has had difficulty with anemia 2nd to heavy menses.  Is not taking Fe at this time.  [] chronic, untreated  -- will discuss need for Fe at next visit  -- check Fe panel at next visit      History of  section complicating pregnancy      Priority: Low     Hx of C/S x 2 at outside facility   1 Arrest  2 Elective repeat (2016)        History of thyroid disease 2022     Priority: Low     Hx of autoimmune thyroid dz. She is unsure the cause of her hyperthyroidism. She was treated with PTU in the past.  This was diagnosed many years ago. She will get labs done at [facility]. Apparently euthyroid for the past several years. Has not been on meds for years. TSH normal on IPV labs     22 at [facility]: has not been on meds for years. Labs  Free T4 0.9, TSH 0.477      Cortical cataract of both eyes 2022     Priority: Low    Acute left-sided low back pain without sciatica 2021     Priority: Low     Last Assessment & Plan:   Formatting of this note might be different from the original.  Onset  at least 2 weeks ago. Reports intermittent pain that can last for at least 3 hours. Denies any trauma or injury. No leg weakness or swelling. She tried taking Motrin to help with the pain. No abdominal pain or nausea. Noticed the pain occurred after being intimate  with her spouse. Will order a urinalysis and follow up pending results. Prescribe a trial of Flexeril 10 mg at bedtime and Voltaren XL 75 mg, BID as needed. Benefits, risks, and side effects of medication discussed.  Hyperopia of both eyes 2021     Priority: Low    Infection due to yeast 2019     Priority: Low     Last Assessment & Plan:   Formatting of this note might be different from the original.  Complains of a vaginal discharge and feeling like there is a clump of something in her vagina. She does have some itching. She relates she has a discharge frequently after she has intercourse with her  whether he uses a condom or not.   We treated her for BV in February of this year and her symptoms resolved at that time. On exam she does have a thick white discharge in the vault and wet prep is taken. We will give her a prescription for diflucan 150 mg for three days. Benefits, risks, and side effects of medication discussed.  Vaginal discharge 02/28/2019     Priority: Low     Last Assessment & Plan:   Formatting of this note might be different from the original.  Patient complains of thick milky discharge. Denies any itching, burning or rashes. She denies abdominal pain. She states that she has had this in the past, DX either Bacterial Vaginosis or yeast infection. Mostly occurs after intercourse, patient reports she is HIV+ but partner does use condoms. She is not allergic to latex that she is aware of. Will send Wet prep and GC/Chalamydia culture off and follow up pending results. In the meantime will prescribe Metrogel gel to apply at bedtime. Benefits, risks, and side effects of medication discussed.  Heart murmur 12/27/2018     Priority: Low     Last Assessment & Plan:   Formatting of this note might be different from the original.  History; noted x1 and ECHO = normal by report in note  Formatting of this note might be different from the original.  Last Assessment & Plan:   History; noted x1 and ECHO = normal by report in note    06/16/22 at [facility]:  Patient reports being diagnosed after leaving hospital in 2006 after having her son.   Has had no issues and does not see a cardiologist         Heart block AV first degree 05/27/2014     Priority: Low     Formatting of this note might be different from the original.  2011-09 1st degree heart block on Reyataz, epzicom; improved off reyataz; ECHO = normal      Last Assessment & Plan:   Formatting of this note might be different from the original.  Update: repeat ECHO at next vist  Formatting of this note might be different from the original.  Overview:   2011-09 1st degree heart block on Reyataz, epzicom; improved off reyataz; ECHO = normal    Last Assessment & Plan:   Update: repeat ECHO at next vist       1) Advanced Maternal Age (Maternal Age 28 or greater at [facility])    First and Second Trimester  The risk of fetal aneuploidy based on maternal age should be reviewed with patient either with physicians or genetic counselor, or both. Testing for fetal aneuploidy can be divided into invasive and non-invasive tests.  Invasive testing, which includes amniocentesis and chorionic villus sampling, is diagnostic.  Non-invasive testing of maternal blood (for either hormone levels or cell-free fetal DNA), with or without ultrasound examination, is a screening test and requires follow-up testing of patients with screen-positive results. Given the increased risk of congenital anomalies in older women, a detailed second trimester ultrasound examination to assess for significant structural anomalies (particularly cardiac defects) is recommended. Other events that occur with increased frequency with increasing maternal age include hypertensive disease and preeclampsia, placenta previa, gestational diabetes, and placental abruption. Recommend educating women about these conditions. Additionally, I recommend daily 162mg ASA for early/severe preeclampsia prevention. Third Trimester  There are no large randomized trials that have examined the efficacy of routine antepartum testing in women age 28 and older, therefore there is no consensus on the management of late pregnancy for this population. One strategy is to stratify women based on their risk factors, such as age and parity, and to consider other factors that might influence risk, such as pregestational obesity and race.  The risk of stillbirth increases with increasing maternal age such that women ? 36years of age have the same risk of stillbirth at 43 weeks of gestation as women in their mid-20s have at 36 weeks of gestation.  For this reason, we recommend beginning testing at 40 weeks in those 36years of age and older due to stillbirth risk. Consider induction at 44 weeks of gestation for women who are ?28 years and primiparous, ?44years of age, of black race, or who have additional risk factors for stillbirth such as obesity. Women who decline induction should undergo  testing and daily kick counts until spontaneous labor, nonreassuring testing, or 41 weeks of gestation. 2) COVID-19 in Pregnancy  Presentation and clinical findings for COVID-19 do not differentiate in pregnant women vs the general population. The symptoms are similar for all the current variants, although some variants have increased transmissibility. Typical presentation with COVID-19:   Common clinical features: Fever  Fatigue  Dry cough  o Lymphopenia  o Myalgia  o Sore throat  o Loss of smell and taste; loss of appetite  o Nausea/Vomiting/Diarrhea   Additional findings which may be associated   o Liver involvement (elevated transaminases, coagulopathies) and decreased platelets- these may mimic HELLP Syndrome  o Cardiac injury biomarkers    Early data suggested that pregnant women may not be at higher risk for COVID-19 or have a more severe course if they become infected, however as more information has become available, this has come into question as ICU admissions happen in pregnancy at a greater rate than in the nonpregnant age-matched population. Additionally, the clinical course in pregnancy women seems to last longer than in an age-matched cohort. Vertical Transmission- Primarily via respiratory droplets during the  period when neonates are exposed to others with COVID-19   Limited reports regarding intrapartum or peripartum transmission    Clinical significance unclear, but there is mounting evidence regarding placentitis and increased risk for stillbirth.     Concern remains for possibility of infection beginning in first trimester with impact on placental and fetal well-being longterm.  No evidence of placental infection based on ultrasound today.   ___________    Symptomatic patients with COVID-19 should remain on Transmission-Based Precautions until   At least 3 days (72 hours) have passed since recovery defined as resolution of fever without the use of fever-reducing medications and improvement in respiratory symptoms (e.g., cough, shortness of breath) and   At least 5 days have passed since symptoms first appeared (if mild/asymptomatic)// 10 days if moderate symptoms per [facility] Dec 2021.      Use of monoclonal antibody has demonstrated improved outcomes with Delta Variant; however less effective for Omicron variant. This therapy is appropriate for pregnant patients up to 10 days after symptom onset if only mild disease without oxygen requirement. Wait 90 days prior to vaccination/booster. Use of dexamethasone may be considered in those with moderate to severe disease dependent upon timing of presentation and clinical course. Patients should be placed on 162mg ASA through 6 weeks postpartum. Consideration of prophylactic anticoagulation if requires hospitalization. If patient is requiring oxygen supplementation to maintain O2 >95%, transfer to [facility] should occur. Reviewed appropriate supplements/medications for symptomatic relief in pregnancy. Strict precautions. Recommend monitoring of fetal well-being for remainder of pregnancy.  Growth at 28, 34 wk.  Dependent upon maternal and fetal status, may require  testing.             Addressed normal pregnancy complaints, reassured and offered suggestions for care  Reviewed gestational age precautions and activity goals/limitations  Nutritional counseling as well as specific goals based on current maternal and fetal status  Options for GERD therapy if becomes symptomatic over course of pregnancy  Gestational age appropriate preventive care regarding communicable disease transmission and vaccines as appropriate (including flu, TDaP, and COVID.)  Additional plans and concerns as documented in problem list.   All questions answered and concerns discussed. F/U at [facility] at 20 weeks. On this date@ I have spent 65 minutes reviewing previous notes, test results and face to face with the patient discussing the diagnosis and importance of compliance with the treatment plan, in addition to ultrasound findings, as well as documenting on the day of the visit      An electronic signature was used to authenticate this note. Lucia Rodriguez MD    1. Heart block AV first degree    2. COVID-19 affecting pregnancy in first trimester    3. Endometrial polyp    4. Uterine fibroid in pregnancy    5. HIV disease affecting pregnancy in first trimester    6. Supervision of high risk pregnancy in first trimester    7. History of  section complicating pregnancy    8. History of myomectomy    9. History of thyroid disease    10. Multigravida of advanced maternal age in first trimester    6. Other iron deficiency anemia    12.  Heart murmur    13. 13 weeks gestation of pregnancy

## 2022-06-20 DIAGNOSIS — O99.011 ANEMIA AFFECTING PREGNANCY IN FIRST TRIMESTER: ICD-10-CM

## 2022-06-20 LAB
FERRITIN SERPL-MCNC: 11 NG/ML (ref 8–388)
HGB BLD-MCNC: 8.4 G/DL (ref 11.7–15.4)
HGB RETIC QN AUTO: 27 PG (ref 29–35)
IMM RETICS NFR: 26.2 % (ref 3–15.9)
IRON SATN MFR SERPL: 5 %
IRON SERPL-MCNC: 24 UG/DL (ref 35–150)
RETICS # AUTO: 0.09 M/UL (ref 0.03–0.1)
RETICS/RBC NFR AUTO: 2.6 % (ref 0.3–2)
TIBC SERPL-MCNC: 468 UG/DL (ref 250–450)

## 2022-06-23 LAB
HGB A MFR BLD: 97.9 % (ref 96.4–98.8)
HGB A2 MFR BLD COLUMN CHROM: 2.1 % (ref 1.8–3.2)
HGB F MFR BLD: 0 % (ref 0–2)
HGB FRACT BLD-IMP: NORMAL
HGB S MFR BLD: 0 %

## 2022-07-07 ENCOUNTER — ROUTINE PRENATAL (OUTPATIENT)
Dept: OBGYN CLINIC | Age: 47
End: 2022-07-07

## 2022-07-07 VITALS — WEIGHT: 160 LBS | BODY MASS INDEX: 25.06 KG/M2 | SYSTOLIC BLOOD PRESSURE: 112 MMHG | DIASTOLIC BLOOD PRESSURE: 78 MMHG

## 2022-07-07 DIAGNOSIS — O98.711 HIV DISEASE AFFECTING PREGNANCY IN FIRST TRIMESTER: ICD-10-CM

## 2022-07-07 DIAGNOSIS — D50.9 IRON DEFICIENCY ANEMIA, UNSPECIFIED IRON DEFICIENCY ANEMIA TYPE: Primary | ICD-10-CM

## 2022-07-07 DIAGNOSIS — O09.92 SUPERVISION OF HIGH RISK PREGNANCY IN SECOND TRIMESTER: ICD-10-CM

## 2022-07-07 PROCEDURE — 99902 PR PRENATAL VISIT: CPT | Performed by: OBSTETRICS & GYNECOLOGY

## 2022-07-07 NOTE — ASSESSMENT & PLAN NOTE
CBC, reticulocyte count, TIBC, transferrin saturation, serum ferritin today   Referral pending to heme/onc

## 2022-07-07 NOTE — PROGRESS NOTES
CRISTY. [id number].  16w0d   Denies LOF, VB, Ctxs. Good FM. Vitals:    07/07/22 1603   BP: 112/78        Supervision of high-risk pregnancy  S/p MFM consult with recommendation in overview. Plan for anatomy scan with them and to evaluate for PAS    HIV disease affecting pregnancy in first trimester   FU with ID in July. Stable w/ undetectable VL. See overview for full details     Anemia  CBC, reticulocyte count, TIBC, transferrin saturation, serum ferritin completed    Referral pending to heme/onc  CANNOT tolerate PO.  Vomits with each use  Discussed need for IV iron with patient          [address], DO

## 2022-07-25 ENCOUNTER — TELEPHONE (OUTPATIENT)
Dept: ONCOLOGY | Age: 47
End: 2022-07-25

## 2022-08-02 NOTE — PROGRESS NOTES
[facility] Hematology and Oncology: New Patient - Consultation    Chief Complaint   Patient presents with    New Patient     Reason for Referral: Iron deficiency anemia, unspecified iron deficiency anemia type  Referring Provider: Nahed Dumont DO  Primary Care Provider: Remington Glover MD  Family History of Cancer/Hematologic Disorders: Family history is significant for paternal grandfather with prostate cancer. Presenting Symptoms: No relevant physical symptoms documented    History of Present Illness:  Ms. Willie Helms is a 41 y.o. AA female who presents today in referral from Dr. Janene Sommers for consultation regarding anemia in pregnancy. The past medical history is significant for systemic lupus erythematosus, hyperthyroidism, HIV, abnormal Pap smear of cervix,  section x 2, tubal cautery, myomectomy, and tubal reversal, who is 20w 0d pregnant with an EDC of 22. She presented to [facility] on 22 for new OB consult. She had embryo transfer with donor egg on 22. Routine prenatal labs drawn the same day were significant for WBC 4.1, RBC 4.04, HGB 9.0, HCT 31.4, MCV 77.7, MCH 22.3, MCHC 28.7, and RDW 16.7. Hemoglobinopathy evaluation was WNL with normal hemoglobin present and not hemoglobin variant or beta thalassemia identified. On 22, patient was started on 325 mg of ferrous sulfate BID in addition to PNV with iron. Patient is also followed by [facility] as pregnancy is high risk secondary to AMA, HIV, anemia, C/S X2, fibroids, and +covid in pregnancy. Patient was diagnosed with HIV in . She is managed by [facility] every 6 months. Last appointment viral load per patient was undetectable. She is taking Epzicom, Prezista, and Norvir daily. Labs on 22 showed a drop in HGB to 8.4. Iron panel was significant for iron 24, TIBC 468, and transferrin saturation 5. Ferritin was WNL at 11.   Additional labs drawn on 22 were significant for retic count 2.6, immature retic fraction 26.2, and retic hemoglobin concentration 27. Hemoglobinopathy evaluation drawn a 2nd time on 6/20/22 was again WNL. Patient followed up with OB on 7/7/22. She reported that she is unable to take PO iron secondary to vomiting. Referral was placed to [facility], per OB, for hematology evaluation and treatment of anemia. Today, she is here for consultation. We discussed pathophysiology of hematopoesis in general going over anemia in pregnancy and changes to blood during pregnancy. She wishes to p/w IV iron at this time and we reviewed options, Se and risks.   She VU.         6/6/22 16:19 6/20/22 08:12   WBC 4.1 (L)     RBC 4.04 (L)     Hemoglobin Quant 9.0 (L) 8.4 (L)   Hematocrit 31.4 (L)     MCV 77.7 (L)     MCH 22.3 (L)     MCHC 28.7 (L)     MPV 10.9     RDW 16.7 (H)     Platelet Count 446     Absolute Mono # 0.3     Eosinophils % 1     Basophils Absolute 0.0     Differential Type AUTOMATED     Seg Neutrophils 61     Segs Absolute 2.5     Lymphocytes 30     Absolute Lymph # 1.2     Monocytes 7     Absolute Eos # 0.1     Basophils 1     Immature Granulocytes 0     Nucleated Red Blood Cells 0.00     RETIC HGB CONC.   27 (L)   Absolute Immature Granulocyte 0.0          6/20/22 08:12   Ferritin 11   Iron 24 (L)   TIBC 468 (H)        6/20/22 08:12   IMMATURE RETIC FRACTION 26.2 (H)   Absolute Retic # [id number]      HEMOGLOBINOPATHY EVALUATION       Other Pertinent Information:  04/08/2021 (COVID-19, PFIZER PURPLE top, DILUTE for use, (age 15 y+), 30mcg/0.3mL)  05/06/2021 (COVID-19, PFIZER PURPLE top, DILUTE for use, (age 15 y+), 30mcg/0.3mL)  12/16/2021 (COVID-19, PFIZER PURPLE top, DILUTE for use, (age 15 y+), 30mcg/0.3mL)    8/2022 heme consultation       Family History   Problem Relation Age of Onset    Prostate Cancer Paternal Grandfather     Diabetes Mother     No Known Problems Father     Alzheimer's Disease Maternal Grandmother     No Known Problems Maternal Grandfather     No Known Problems Paternal Grandmother     Breast Cancer Neg Hx     Colon Cancer Neg Hx     Hypertension Neg Hx       Social History     Socioeconomic History    Marital status:      Spouse name: None    Number of children: None    Years of education: None    Highest education level: None   Tobacco Use    Smoking status: Never    Smokeless tobacco: Never   Vaping Use    Vaping Use: Never used   Substance and Sexual Activity    Alcohol use: Not Currently    Drug use: Never    Sexual activity: Not Currently     Partners: Male   Social History Narrative    GYN: 5/30/2019  Abnormal Pap Smears: yes  Cervical Procedures: no  STDs: yes  chlamydia  and HIV (through 1st son's biological father)          Review of Systems   Constitutional:  Positive for fatigue. Negative for appetite change, chills, diaphoresis, fever and unexpected weight change. HENT:   Negative for hearing loss, mouth sores, nosebleeds, sore throat, trouble swallowing and voice change. Eyes:  Negative for icterus. Respiratory:  Negative for chest tightness, hemoptysis, shortness of breath and wheezing. Cardiovascular:  Negative for chest pain, leg swelling and palpitations. Gastrointestinal:  Negative for abdominal distention, abdominal pain, blood in stool, constipation, diarrhea, nausea and vomiting. Endocrine: Negative for hot flashes. Genitourinary:  Negative for difficulty urinating, frequency, vaginal bleeding and vaginal discharge. Musculoskeletal:  Negative for arthralgias, flank pain, gait problem and myalgias. Skin:  Negative for itching, rash and wound. Neurological:  Negative for dizziness, extremity weakness, gait problem, headaches and numbness. Psychiatric/Behavioral:  Negative for confusion and depression. The patient is not nervous/anxious.        No Known Allergies  Past Medical History:   Diagnosis Date    Abnormal Pap smear of cervix     Anemia     prescribed iron to take daily     [facility] HIV disease ([facility]     Hyperthyroidism     No meds for several years    Systemic lupus erythematosus ([facility]      Past Surgical History:   Procedure Laterality Date     SECTION      x 2     LAP,TUBAL CAUTERY      MYOMECTOMY      laparoscopic    OTHER SURGICAL HISTORY      tubal reversal     WISDOM TOOTH EXTRACTION       Current Outpatient Medications   Medication Sig Dispense Refill    aspirin EC 81 MG EC tablet Take 2 tablets by mouth in the morning. 60 tablet 10    Prenatal Vit-Fe Fumarate-FA (PRENATAL VITAMINS PO) prenatal vit 10-iron-folic-dha   1 tab daily      Doxylamine Succinate, Sleep, (UNISOM PO) Take by mouth      abacavir-lamiVUDine (EPZICOM) 600-300 MG per tablet Take 1 tablet by mouth daily      darunavir (PREZISTA) 800 MG TABS tablet Take by mouth      ritonavir (NORVIR) 100 MG tablet Take 100 mg by mouth daily       ferrous sulfate (IRON 325) 325 (65 Fe) MG tablet Take 1 tablet by mouth 2 times daily (Patient not taking: Reported on 2022) 60 tablet 5    pyridoxine (B-6) 50 MG tablet Take 50 mg by mouth daily  (Patient not taking: Reported on 2022)       No current facility-administered medications for this visit. No flowsheet data found. OBJECTIVE:  /73 (Position: Sitting)   Pulse (!) 104   Resp 16   Ht 5' 7\" (1.702 m)   Wt 167 lb 8 oz (76 kg)   SpO2 100%   BMI 26.23 kg/m²       ECOG PERFORMANCE STATUS - 0-Fully active, able to carry on all pre-disease performance without restriction. Pain - 0 - No pain/10. None/Minimal pain - not affecting QOL     Fatigue - No flowsheet data found. Distress - No flowsheet data found. Physical Exam  Vitals reviewed. Exam conducted with a chaperone present. Constitutional:       General: She is not in acute distress. Appearance: Normal appearance. She is normal weight. She is not ill-appearing or toxic-appearing. HENT:      Head: Normocephalic and atraumatic. Nose: Nose normal. No congestion. Mouth/Throat:      Mouth: Mucous membranes are moist.   Eyes:      General: No scleral icterus. Extraocular Movements: Extraocular movements intact. Conjunctiva/sclera: Conjunctivae normal.      Pupils: Pupils are equal, round, and reactive to light. Cardiovascular:      Rate and Rhythm: Normal rate and regular rhythm. Heart sounds: No murmur heard. Pulmonary:      Effort: Pulmonary effort is normal. No respiratory distress. Breath sounds: Normal breath sounds. No wheezing or rales. Chest:   Breasts:     Right: No axillary adenopathy or supraclavicular adenopathy. Left: No axillary adenopathy or supraclavicular adenopathy. Abdominal:      Comments: Gravid    Musculoskeletal:         General: Normal range of motion. Cervical back: Normal range of motion. Right lower leg: No edema. Left lower leg: No edema. Lymphadenopathy:      Cervical: No cervical adenopathy. Upper Body:      Right upper body: No supraclavicular or axillary adenopathy. Left upper body: No supraclavicular or axillary adenopathy. Skin:     General: Skin is warm and dry. Coloration: Skin is not jaundiced or pale. Findings: No rash. Neurological:      General: No focal deficit present. Mental Status: She is alert and oriented to person, place, and time. Gait: Gait normal.   Psychiatric:         Behavior: Behavior normal.         Thought Content:  Thought content normal.        Labs:  Recent Results (from the past 168 hour(s))   CBC with Auto Differential    Collection Time: 08/08/22  2:52 PM   Result Value Ref Range    WBC 7.2 4.3 - 11.1 K/uL    RBC 3.39 (L) 4.05 - 5.2 M/uL    Hemoglobin 7.6 (L) 11.7 - 15.4 g/dL    Hematocrit 25.8 (L) 35.8 - 46.3 %    MCV 76.1 (L) 79.6 - 97.8 FL    MCH 22.4 (L) 26.1 - 32.9 PG    MCHC 29.5 (L) 31.4 - 35.0 g/dL    RDW 16.5 (H) 11.9 - 14.6 %    Platelets 338 855 - 205 K/uL    MPV 9.7 9.4 - 12.3 FL    nRBC 0.04 0.0 - 0.2 K/uL    Differential Type AUTOMATED      Seg Neutrophils 72 43 - 78 %    Lymphocytes 16 13 - 44 %    Monocytes 9 4.0 - 12.0 %    Eosinophils % 1 0.5 - 7.8 %    Basophils 0 0.0 - 2.0 %    Immature Granulocytes 2 0.0 - 5.0 %    Segs Absolute 5.2 1.7 - 8.2 K/UL    Absolute Lymph # 1.2 0.5 - 4.6 K/UL    Absolute Mono # 0.6 0.1 - 1.3 K/UL    Absolute Eos # 0.1 0.0 - 0.8 K/UL    Basophils Absolute 0.0 0.0 - 0.2 K/UL    Absolute Immature Granulocyte 0.1 0.0 - 0.5 K/UL   Vitamin B12    Collection Time: 08/08/22  2:52 PM   Result Value Ref Range    Vitamin B-12 795 193 - 986 pg/mL   Folate    Collection Time: 08/08/22  2:52 PM   Result Value Ref Range    Folate 21.6 (H) 3.1 - 17.5 ng/mL       Imaging: reviewed       ASSESSMENT:     Diagnosis Orders   1. Iron deficiency anemia, unspecified iron deficiency anemia type  CBC with Auto Differential    Vitamin B12    Folate    Path Review, Smear          Ms. Tyesha Lopez is here for evaluation of anemia in pregnancy complicated by AMA, HIV and PATY. Anemia in pregnancy   During today's visit, we discussed the pathophysiology of hematopoiesis in general and reviewed different etiologies of anemia. We also reviewed the changes that occur physically to woman's blood during pregnancy. We will proceed with CBC, peripheral smear, vitamin B12/folic acid. Hep neg, TSH checked by GYN and normal.  She has been on oral iron for some time without good tolerance and no response. IV iron is used when pregnant women cannot tolerate oral iron, in those with severe anemia larry late in pregnancy and in those for whom oral iron is not effective in rising Hb/ferritin. IV iron is not used in 1st trimester, but can be started after week 13-14. Of note, high molecular weight iron dextrans previously associated with possible serious hypersensitivity reactions leading to anaphylaxis are no longer clinically available. All current IV formulations appear to have equivalent safety and efficacy.   We do avoid formulations that contain benzyl alcohol (ie ferric gluconate (Ferrlecit) due to potential risk to fetus). In pts with inflammatory arthritis, IBD or multiple drug allergies - may consider glucocorticoid prior to iron infusion to reduce minor infusion reactions that can occur in 1-3% of administrations. Multiple analyses showed safety of IV iron administration in pregnancy. After review of options, pt wishes to proceed with IV iron. SE reviewed and printed info given to pt for her reference. - will continue to monitor closely. - has hx of SLE - no recent symptoms to report; can check anti-Ro/La Abs at next apt - Fetus exposed to these abs can be at increased risk of heart block     RESUSCITATION DIRECTIVES/HOSPICE CARE: Full Support    RTC ~2-3wks after 2nd infusion to assess response. [60min encounter - chart review, discussion/c onsultation, coordination of care and charting]    MDM  Number of Diagnoses or Management Options  Iron deficiency anemia, unspecified iron deficiency anemia type: established, worsening  Other forms of systemic lupus erythematosus, unspecified organ involvement status ([facility]): new, needed workup  Supervision of high risk pregnancy in second trimester: new, needed workup     Amount and/or Complexity of Data Reviewed  Clinical lab tests: ordered and reviewed  Tests in the medicine section of CPT®: ordered  Review and summarize past medical records: yes  Independent visualization of images, tracings, or specimens: yes    Risk of Complications, Morbidity, and/or Mortality  Presenting problems: moderate  Diagnostic procedures: moderate  Management options: high        Lab studies and imaging studies were personally reviewed. Pertinent old records were reviewed. All questions were asked and answered to the best of my ability. The patient verbalized understanding and agrees with the plan above. Documentation was sent to Dr. Elijah Rojas for continuation of care.   Thank you, Dr. Elijah Rojas, for the courtesy of this referral.        [facility] Steve Villatoro, [address] [facility] Hematology and Oncology  [address] [place] [address], [address] [place] [address]  Office : [phone]  Fax : [phone]

## 2022-08-03 ENCOUNTER — ROUTINE PRENATAL (OUTPATIENT)
Dept: OBGYN CLINIC | Age: 47
End: 2022-08-03

## 2022-08-03 VITALS — DIASTOLIC BLOOD PRESSURE: 84 MMHG | BODY MASS INDEX: 26.47 KG/M2 | SYSTOLIC BLOOD PRESSURE: 124 MMHG | WEIGHT: 169 LBS

## 2022-08-03 DIAGNOSIS — O34.10 UTERINE FIBROID IN PREGNANCY: ICD-10-CM

## 2022-08-03 DIAGNOSIS — D25.9 UTERINE FIBROID IN PREGNANCY: ICD-10-CM

## 2022-08-03 DIAGNOSIS — O09.522 MULTIGRAVIDA OF ADVANCED MATERNAL AGE IN SECOND TRIMESTER: ICD-10-CM

## 2022-08-03 DIAGNOSIS — O34.219 HISTORY OF CESAREAN SECTION COMPLICATING PREGNANCY: Primary | ICD-10-CM

## 2022-08-03 DIAGNOSIS — D50.8 OTHER IRON DEFICIENCY ANEMIA: ICD-10-CM

## 2022-08-03 DIAGNOSIS — U07.1 COVID-19 AFFECTING PREGNANCY IN FIRST TRIMESTER: ICD-10-CM

## 2022-08-03 DIAGNOSIS — O98.511 COVID-19 AFFECTING PREGNANCY IN FIRST TRIMESTER: ICD-10-CM

## 2022-08-03 DIAGNOSIS — O09.92 SUPERVISION OF HIGH RISK PREGNANCY IN SECOND TRIMESTER: ICD-10-CM

## 2022-08-03 DIAGNOSIS — Z98.890 HISTORY OF MYOMECTOMY: ICD-10-CM

## 2022-08-03 DIAGNOSIS — Z86.39 HISTORY OF THYROID DISEASE: ICD-10-CM

## 2022-08-03 DIAGNOSIS — O98.711 HIV DISEASE AFFECTING PREGNANCY IN FIRST TRIMESTER: ICD-10-CM

## 2022-08-03 PROCEDURE — 99902 PR PRENATAL VISIT: CPT | Performed by: OBSTETRICS & GYNECOLOGY

## 2022-08-03 RX ORDER — ASPIRIN 81 MG/1
162 TABLET ORAL DAILY
Qty: 60 TABLET | Refills: 10 | Status: SHIPPED | OUTPATIENT
Start: 2022-08-03

## 2022-08-03 NOTE — PROGRESS NOTES
CRISTY. [id number].  19w6d   Denies LOF, VB, Ctxs. Good FM. Mild tachycardia. No CP    Vitals:    08/03/22 1536   BP: 124/84        Anemia  CBC, reticulocyte count, TIBC, transferrin saturation, serum ferritin completed    Referral pending to heme/onc  CANNOT tolerate PO. Vomits with each use    Appt scheduled w/ hematology     Supervision of high-risk pregnancy  Anatomy scan scheduled with MFM for 8/11  Asa    If no improvement in sx after IV iron will refer to cardiology for holter. But likely due to severe anemia. Increase hydration.  Avoid caffeine     HIV disease affecting pregnancy in first trimester  Will need serial growth US in the 3rd trimester        Kisha Crawford,

## 2022-08-03 NOTE — ASSESSMENT & PLAN NOTE
CBC, reticulocyte count, TIBC, transferrin saturation, serum ferritin completed    Referral pending to heme/onc  CANNOT tolerate PO.  Vomits with each use  Discussed need for IV iron with patient

## 2022-08-04 NOTE — PROGRESS NOTES
New Patient Abstract    Reason for Referral: Iron deficiency anemia, unspecified iron deficiency anemia type    Referring Provider: Gavino Buenrostro DO    Primary Care Provider: Palmer Yanez MD    Family History of Cancer/Hematologic Disorders: Family history is significant for paternal grandfather with prostate cancer. Presenting Symptoms: No relevant physical symptoms documented    Narrative with recent with Results/Procedures/Biopsies and Dates completed: Ms. Cornelius Castillo is a 51-year-old, [id number], black female with a history of systemic lupus erythematosus, hyperthyroidism, HIV, abnormal Pap smear of cervix,  section x 2, tubal cautery, myomectomy, and tubal reversal, who is 20w 0d pregnant with an EDC of 22. She presented to [facility] on 22 for new OB consult. She had embryo transfer with donor egg on 22. Routine prenatal labs drawn the same day were significant for WBC 4.1, RBC 4.04, HGB 9.0, HCT 31.4, MCV 77.7, MCH 22.3, MCHC 28.7, and RDW 16.7. Hemoglobinopathy evaluation was WNL with normal hemoglobin present and not hemoglobin variant or beta thalassemia identified. On 22, patient was started on 325 mg of ferrous sulfate BID in addition to PNV with iron. Patient is also followed by [facility] as pregnancy is high risk secondary to AMA, HIV, anemia, C/S X2, fibroids, and +covid in pregnancy. Patient was diagnosed with HIV in . She is managed by [facility] every 6 months. Last appointment viral load per patient was undetectable. She is taking Epzicom, Prezista, and Norvir daily. Labs were repeated on 22 showing a drop in HGB to 8.4. Iron panel was significant for iron 24, TIBC 468, and transferrin saturation 5. Ferritin was WNL at 11. Additional labs drawn on 22 were significant for retic count 2.6, immature retic fraction 26.2, and retic hemoglobin concentration 27.  Hemoglobinopathy evaluation drawn a 2nd time on 6/20/22 was again WNL. Patient followed up with OB on 7/7/22. She reported that she is unable to take PO iron secondary to vomiting. Referral was placed to [facility], per OB, for hematology evaluation and treatment of anemia.       6/6/22 16:19 6/20/22 08:12   WBC 4.1 (L)    RBC 4.04 (L)    Hemoglobin Quant 9.0 (L) 8.4 (L)   Hematocrit 31.4 (L)    MCV 77.7 (L)    MCH 22.3 (L)    MCHC 28.7 (L)    MPV 10.9    RDW 16.7 (H)    Platelet Count 138    Absolute Mono # 0.3    Eosinophils % 1    Basophils Absolute 0.0    Differential Type AUTOMATED    Seg Neutrophils 61    Segs Absolute 2.5    Lymphocytes 30    Absolute Lymph # 1.2    Monocytes 7    Absolute Eos # 0.1    Basophils 1    Immature Granulocytes 0    Nucleated Red Blood Cells 0.00    RETIC HGB CONC.  27 (L)   Absolute Immature Granulocyte 0.0       6/20/22 08:12   Ferritin 11   Iron 24 (L)   TIBC 468 (H)      6/20/22 08:12   IMMATURE RETIC FRACTION 26.2 (H)   Absolute Retic # [id number]     HEMOGLOBINOPATHY EVALUATION       Notes from Referring Provider: None    Other Pertinent Information:   04/08/2021 (COVID-19, PFIZER PURPLE top, DILUTE for use, (age 15 y+), 30mcg/0.3mL)  05/06/2021 (COVID-19, PFIZER PURPLE top, DILUTE for use, (age 15 y+), 30mcg/0.3mL)  12/16/2021 (COVID-19, PFIZER PURPLE top, DILUTE for use, (age 15 y+), 30mcg/0.3mL)    Presented at Tumor Board:  N/A

## 2022-08-08 ENCOUNTER — HOSPITAL ENCOUNTER (OUTPATIENT)
Dept: LAB | Age: 47
Discharge: HOME | End: 2022-08-11
Payer: OTHER GOVERNMENT

## 2022-08-08 ENCOUNTER — OFFICE VISIT (OUTPATIENT)
Dept: ONCOLOGY | Age: 47
End: 2022-08-08
Payer: OTHER GOVERNMENT

## 2022-08-08 VITALS
DIASTOLIC BLOOD PRESSURE: 73 MMHG | RESPIRATION RATE: 16 BRPM | HEIGHT: 67 IN | HEART RATE: 104 BPM | WEIGHT: 167.5 LBS | SYSTOLIC BLOOD PRESSURE: 117 MMHG | OXYGEN SATURATION: 100 % | BODY MASS INDEX: 26.29 KG/M2

## 2022-08-08 DIAGNOSIS — M32.8 OTHER FORMS OF SYSTEMIC LUPUS ERYTHEMATOSUS, UNSPECIFIED ORGAN INVOLVEMENT STATUS (HCC): ICD-10-CM

## 2022-08-08 DIAGNOSIS — D50.9 IRON DEFICIENCY ANEMIA, UNSPECIFIED IRON DEFICIENCY ANEMIA TYPE: Primary | ICD-10-CM

## 2022-08-08 DIAGNOSIS — O09.522 MULTIGRAVIDA OF ADVANCED MATERNAL AGE IN SECOND TRIMESTER: ICD-10-CM

## 2022-08-08 DIAGNOSIS — O09.92 SUPERVISION OF HIGH RISK PREGNANCY IN SECOND TRIMESTER: ICD-10-CM

## 2022-08-08 DIAGNOSIS — D50.9 IRON DEFICIENCY ANEMIA, UNSPECIFIED IRON DEFICIENCY ANEMIA TYPE: ICD-10-CM

## 2022-08-08 LAB
BASOPHILS # BLD: 0 K/UL (ref 0–0.2)
BASOPHILS NFR BLD: 0 % (ref 0–2)
DIFFERENTIAL METHOD BLD: ABNORMAL
EOSINOPHIL # BLD: 0.1 K/UL (ref 0–0.8)
EOSINOPHIL NFR BLD: 1 % (ref 0.5–7.8)
ERYTHROCYTE [DISTWIDTH] IN BLOOD BY AUTOMATED COUNT: 16.5 % (ref 11.9–14.6)
FOLATE SERPL-MCNC: 21.6 NG/ML (ref 3.1–17.5)
HCT VFR BLD AUTO: 25.8 % (ref 35.8–46.3)
HGB BLD-MCNC: 7.6 G/DL (ref 11.7–15.4)
IMM GRANULOCYTES # BLD AUTO: 0.1 K/UL (ref 0–0.5)
IMM GRANULOCYTES NFR BLD AUTO: 2 % (ref 0–5)
LYMPHOCYTES # BLD: 1.2 K/UL (ref 0.5–4.6)
LYMPHOCYTES NFR BLD: 16 % (ref 13–44)
MCH RBC QN AUTO: 22.4 PG (ref 26.1–32.9)
MCHC RBC AUTO-ENTMCNC: 29.5 G/DL (ref 31.4–35)
MCV RBC AUTO: 76.1 FL (ref 79.6–97.8)
MONOCYTES # BLD: 0.6 K/UL (ref 0.1–1.3)
MONOCYTES NFR BLD: 9 % (ref 4–12)
NEUTS SEG # BLD: 5.2 K/UL (ref 1.7–8.2)
NEUTS SEG NFR BLD: 72 % (ref 43–78)
NRBC # BLD: 0.04 K/UL (ref 0–0.2)
PLATELET # BLD AUTO: 271 K/UL (ref 150–450)
PMV BLD AUTO: 9.7 FL (ref 9.4–12.3)
RBC # BLD AUTO: 3.39 M/UL (ref 4.05–5.2)
VIT B12 SERPL-MCNC: 795 PG/ML (ref 193–986)
WBC # BLD AUTO: 7.2 K/UL (ref 4.3–11.1)

## 2022-08-08 PROCEDURE — 99205 OFFICE O/P NEW HI 60 MIN: CPT | Performed by: INTERNAL MEDICINE

## 2022-08-08 PROCEDURE — 36415 COLL VENOUS BLD VENIPUNCTURE: CPT

## 2022-08-08 PROCEDURE — 82746 ASSAY OF FOLIC ACID SERUM: CPT

## 2022-08-08 PROCEDURE — 85025 COMPLETE CBC W/AUTO DIFF WBC: CPT

## 2022-08-08 PROCEDURE — 82607 VITAMIN B-12: CPT

## 2022-08-08 RX ORDER — SODIUM CHLORIDE 9 MG/ML
INJECTION, SOLUTION INTRAVENOUS CONTINUOUS
Status: CANCELLED | OUTPATIENT
Start: 2022-08-17

## 2022-08-08 RX ORDER — FAMOTIDINE 10 MG/ML
20 INJECTION, SOLUTION INTRAVENOUS
Status: CANCELLED | OUTPATIENT
Start: 2022-08-17

## 2022-08-08 RX ORDER — SODIUM CHLORIDE 0.9 % (FLUSH) 0.9 %
5-40 SYRINGE (ML) INJECTION PRN
Status: CANCELLED | OUTPATIENT
Start: 2022-08-17

## 2022-08-08 RX ORDER — SODIUM CHLORIDE 9 MG/ML
5-250 INJECTION, SOLUTION INTRAVENOUS PRN
Status: CANCELLED | OUTPATIENT
Start: 2022-08-17

## 2022-08-08 RX ORDER — DIPHENHYDRAMINE HYDROCHLORIDE 50 MG/ML
50 INJECTION INTRAMUSCULAR; INTRAVENOUS
Status: CANCELLED | OUTPATIENT
Start: 2022-08-17

## 2022-08-08 RX ORDER — ONDANSETRON 2 MG/ML
8 INJECTION INTRAMUSCULAR; INTRAVENOUS
Status: CANCELLED | OUTPATIENT
Start: 2022-08-17

## 2022-08-08 RX ORDER — EPINEPHRINE 1 MG/ML
0.3 INJECTION, SOLUTION, CONCENTRATE INTRAVENOUS PRN
Status: CANCELLED | OUTPATIENT
Start: 2022-08-17

## 2022-08-08 RX ORDER — HEPARIN SODIUM (PORCINE) LOCK FLUSH IV SOLN 100 UNIT/ML 100 UNIT/ML
500 SOLUTION INTRAVENOUS PRN
Status: CANCELLED | OUTPATIENT
Start: 2022-08-17

## 2022-08-08 RX ORDER — ACETAMINOPHEN 325 MG/1
650 TABLET ORAL
Status: CANCELLED | OUTPATIENT
Start: 2022-08-17

## 2022-08-08 RX ORDER — ALBUTEROL SULFATE 90 UG/1
4 AEROSOL, METERED RESPIRATORY (INHALATION) PRN
Status: CANCELLED | OUTPATIENT
Start: 2022-08-17

## 2022-08-08 NOTE — PATIENT INSTRUCTIONS
Patient Instructions from Today's Visit    Reason for Visit:  New patient visit for anemia during pregnancy      Plan: We want to give you IV iron called Injectafer. This will be given in 2 doses 1 week apart and is safe during pregnancy. We will do some blood work today as well. Follow Up:  - as scheduled    Recent Lab Results:      Treatment Summary has been discussed and given to patient: n/a        -------------------------------------------------------------------------------------------------------------------  Please call our office at [phone] if you have any  of the following symptoms:   Fever of 100.5 or greater  Chills  Shortness of breath  Swelling or pain in one leg    After office hours an answering service is available and will contact a provider for emergencies or if you are experiencing any of the above symptoms. Patient did express an interest in My Chart. My Chart log in information explained on the after visit summary printout at the [facility] [address] desk.     Joann Naranjo RN

## 2022-08-09 PROBLEM — M32.8 OTHER FORMS OF SYSTEMIC LUPUS ERYTHEMATOSUS (HCC): Status: ACTIVE | Noted: 2022-08-09

## 2022-08-09 ASSESSMENT — ENCOUNTER SYMPTOMS
BLOOD IN STOOL: 0
CONSTIPATION: 0
ABDOMINAL PAIN: 0
DIARRHEA: 0
NAUSEA: 0
SORE THROAT: 0
ABDOMINAL DISTENTION: 0
VOMITING: 0
SHORTNESS OF BREATH: 0
SCLERAL ICTERUS: 0
HEMOPTYSIS: 0
WHEEZING: 0
TROUBLE SWALLOWING: 0
CHEST TIGHTNESS: 0
VOICE CHANGE: 0

## 2022-08-10 PROBLEM — M54.50 ACUTE LEFT-SIDED LOW BACK PAIN WITHOUT SCIATICA: Status: RESOLVED | Noted: 2021-05-19 | Resolved: 2022-08-10

## 2022-08-10 PROBLEM — O99.012 ANEMIA AFFECTING PREGNANCY IN SECOND TRIMESTER: Status: ACTIVE | Noted: 2018-03-28

## 2022-08-10 PROBLEM — O09.92 SUPERVISION OF HIGH RISK PREGNANCY IN SECOND TRIMESTER: Status: ACTIVE | Noted: 2022-06-08

## 2022-08-10 PROBLEM — N89.8 VAGINAL DISCHARGE: Status: RESOLVED | Noted: 2019-02-28 | Resolved: 2022-08-10

## 2022-08-11 ENCOUNTER — ROUTINE PRENATAL (OUTPATIENT)
Dept: OBGYN CLINIC | Age: 47
End: 2022-08-11
Payer: OTHER GOVERNMENT

## 2022-08-11 VITALS — SYSTOLIC BLOOD PRESSURE: 119 MMHG | DIASTOLIC BLOOD PRESSURE: 70 MMHG

## 2022-08-11 DIAGNOSIS — O09.522 MULTIGRAVIDA OF ADVANCED MATERNAL AGE IN SECOND TRIMESTER: ICD-10-CM

## 2022-08-11 DIAGNOSIS — O98.511 COVID-19 AFFECTING PREGNANCY IN FIRST TRIMESTER: ICD-10-CM

## 2022-08-11 DIAGNOSIS — O34.219 HISTORY OF CESAREAN SECTION COMPLICATING PREGNANCY: ICD-10-CM

## 2022-08-11 DIAGNOSIS — D25.9 UTERINE FIBROID IN PREGNANCY: ICD-10-CM

## 2022-08-11 DIAGNOSIS — D50.9 IRON DEFICIENCY ANEMIA, UNSPECIFIED IRON DEFICIENCY ANEMIA TYPE: ICD-10-CM

## 2022-08-11 DIAGNOSIS — Z3A.21 21 WEEKS GESTATION OF PREGNANCY: ICD-10-CM

## 2022-08-11 DIAGNOSIS — O34.10 UTERINE FIBROID IN PREGNANCY: ICD-10-CM

## 2022-08-11 DIAGNOSIS — O09.812 PREGNANCY RESULTING FROM IN VITRO FERTILIZATION IN SECOND TRIMESTER: ICD-10-CM

## 2022-08-11 DIAGNOSIS — O09.92 SUPERVISION OF HIGH RISK PREGNANCY IN SECOND TRIMESTER: ICD-10-CM

## 2022-08-11 DIAGNOSIS — Z86.39 HISTORY OF THYROID DISEASE: ICD-10-CM

## 2022-08-11 DIAGNOSIS — O99.012 ANEMIA AFFECTING PREGNANCY IN SECOND TRIMESTER: ICD-10-CM

## 2022-08-11 DIAGNOSIS — O09.522 HIGH RISK PREGNANCY, MULTIGRAVIDA OF ADVANCED MATERNAL AGE IN SECOND TRIMESTER: ICD-10-CM

## 2022-08-11 DIAGNOSIS — O98.712 HIV DISEASE AFFECTING PREGNANCY IN SECOND TRIMESTER: Primary | ICD-10-CM

## 2022-08-11 DIAGNOSIS — Z98.890 HISTORY OF MYOMECTOMY: ICD-10-CM

## 2022-08-11 DIAGNOSIS — U07.1 COVID-19 AFFECTING PREGNANCY IN FIRST TRIMESTER: ICD-10-CM

## 2022-08-11 PROBLEM — B37.9 INFECTION DUE TO YEAST: Status: RESOLVED | Noted: 2019-08-30 | Resolved: 2022-08-11

## 2022-08-11 LAB — PATH REV BLD -IMP: NORMAL

## 2022-08-11 PROCEDURE — 99215 OFFICE O/P EST HI 40 MIN: CPT | Performed by: OBSTETRICS & GYNECOLOGY

## 2022-08-11 PROCEDURE — 76811 OB US DETAILED SNGL FETUS: CPT | Performed by: OBSTETRICS & GYNECOLOGY

## 2022-08-11 ASSESSMENT — PATIENT HEALTH QUESTIONNAIRE - PHQ9
SUM OF ALL RESPONSES TO PHQ QUESTIONS 1-9: 0
1. LITTLE INTEREST OR PLEASURE IN DOING THINGS: 0
SUM OF ALL RESPONSES TO PHQ9 QUESTIONS 1 & 2: 0
2. FEELING DOWN, DEPRESSED OR HOPELESS: 0
SUM OF ALL RESPONSES TO PHQ QUESTIONS 1-9: 0

## 2022-08-11 NOTE — PROGRESS NOTES
MFM Consultation    Fahad Chen (: 1975) is a 41 y.o. [id number] at 21w0d with 2022, by Ultrasound. Presents for evaluation of the following chief complaint(s):  Pregnancy Ultrasound (anatomy) and High Risk Pregnancy (AMA, HIV, Anemia, C/S X2, fibroids, +covid in pregnancy )     Patient is working full time with [employer]. Scheduled to see Primary OB [facility]) on 22. Denies HA or edema. Denies preeclamptic symptoms. Occasional fetal movement  No bleeding, LOF, cramping, ctxs, or vaginal pressure. Interval history since prior appt reviewed and updated as indicated. Review of Systems - per HPI; otherwise unremarkable. Exam:     Vitals:    22 1337   BP: 119/70       Ultrasound: Please see formal ultrasound report under imaging tab. ASSESSMENT/PLAN:  Patient Active Problem List    Diagnosis Date Noted    High risk pregnancy, multigravida of advanced maternal age in second trimester 2022     Priority: High     Conceived via IVF with donor age Darylene Right)  Plans for first screen with [facility]  Asa 162 mg qhs   22 at [facility]: Normal NT 1.4 mm and nasal bone. IVF Donor Egg from 21year old, low risk of aneuploidy. Genetic counseling done by MD.  Declines Genetic Testing. Patient with multiple Medical Conditions that complicate this pregnancy. See each individual Diagnosis Overview for details. Talked with patient about each diagnosis and plan. Patient has excellent knowledge of her conditions. She agrees with POC. Summary of Current Plan  1) History of Myomectomy-Hysteroscopic removal. Multiple small fibroids noted. Increase vascularity see at lower segment. Will follow for PAS with each US. 2) HIV Positive, long standing condition- On HIIT Therapy, last Quant undetectable. Defer Management to ID at [facility]. Will let Neonatologists know prior to delivery. 3) Anemia-Severe Anemia, not tolerating po Fe, N/V. Needs IV Fe in 2nd trimester.    4) Covid-Recent infection, improving, no SOB. 5) Advanced AMA-Donor egg of 21year old, very low risk of Aneuploidy with Nl NT/NB. Very High Risk of PreE, taking 162 mgs ASA.  08/11/22 at [facility]: Normal anatomy; limited echo, AC 96 %, CHRIS WNL, Negative NIPT    F/U MFM 4 weeks growth, echo and PAS eval    See back at [facility] at 20 weeks for Anatomy US and follow up consult. Other forms of systemic lupus erythematosus ([address] 08/09/2022     Priority: Medium    COVID-19 affecting pregnancy in first trimester 06/16/2022     Priority: Medium     06/16/22 at [facility]:  Testing positive for Covid19 on June 9. Symptoms have been mild. Headaches, fevers now just feeling congested, no SOB. Vital signs 127/88, P82, Pulse Ox 100%. 08/11/22 at [facility]:  Denies symptoms  No further testing needed        History of myomectomy 06/08/2022     Priority: Medium     [facility] myomectomy at outside facility in 2010 in [place], [place]  Had term repeat C/S following surgery so assuming not in contractile portion         Anemia affecting pregnancy in second trimester 03/28/2018     Priority: Medium     Severe anemia noted on IPV   Hg 9.0, microcytic   Iron panel/electrophoresis today and start iron BID. Referral to heme/onc for evaluation of IV iron     2009 - noted to be Fe deficient w/ anemia, reporting menorrhagia  2014-02 fe sat = 6% hgb = 8.9  2014-05 h/h = 13/41- resolved    History: has had difficulty with anemia 2nd to heavy menses. Is not taking Fe at this time. [] chronic, untreated  -- will discuss need for Fe at next visit  -- check Fe panel at next visit    06/16/22 at [facility]: HGB on 6/6 9.0. Was started on iron BID.    7/07 unable to tolerate PO iron. Vomits with each use.   08/11/22 at [facility]: Scheduled for injectafer injections with Hematology on 8/17/22              Pregnancy resulting from in vitro fertilization in second trimester 01/14/2016     Priority: Medium     Donor egg.  Embryos were not genetically tested   Asa 162 qhs   Planning first screen with MFM  06/16/22 at [facility]: Denies genetic testing today. Patient reassured by ultrasound today       Uterine fibroid in pregnancy 06/09/2015     Priority: Medium       06/16/22 at [facility]: several small fibroids seen on ultrasound today       HIV disease affecting pregnancy in second trimester 05/27/2014     Priority: Medium     Dx in 2003   Followed by  ID    2014-09 cd4= 622 (36%)  hiv rna =nd  ----------  2004-02 heterosexual exposure with subsequent pharygitis; followed by recurrent vaginal yeast infections abnormal pap  2004-09 Diagnosis 2nd to recurrent vaginal yeast infections. 2004-10 Valjessica Pressley; cd4= 224, hiv rna = [id number]  2005-03 epzicom, reyataz (unboosted) [hyperpigmentation 2nd to [address]  2005: tx w/ trizivir/ norvir, reyataz when pregnant  2006: epzicom, reyataz (unboosted) >> hiv rna = undetected and cd4 = 300-400  2009-02: formal safe sex/HIV transmission partner counseling by provider  2011-09 paul Herrmann Mems >> epzicom, rezista, norvir bc of 1st degree heart block  2012-08 hiv rna <20 cd4= 362  2014-05 [facility] 1st visit HX: Diagnosed in 2003 and was started on treatment very soon; Antiretroviral history = has been treated with truvada but this causes skin discoloration; norvir = gel cap = dificult to swallow; reyataz = but had more medications at the time and then was changed to simplify regimen. Exposure  to infected male- father of her son; now  since 2006;  (hiv-) is tested regularly; HIV RNA = not detected    -- monitor labs for efficacy and toxicity of antiviral therapy  -- refill darunavir, norvir, truvada  -- rtn in 4-6m    06/16/22 at [facility]: Patient was diagnosed with HIV in 2003. Managed with [employer] every 6 months. Next appointment is in July. Last appointment viral load per patient was undetectable. Taking Epzicom, Prezista, and Norvir daily. Would not change any treatment, watch for FGR with serial US.             Anemia, iron deficiency 2014     Priority: Medium      - noted to be Fe deficient w/ anemia, reporting menorrhagia  2014 fe sat = 6% hgb = 8.9  2014 h/h = - resolved    History: has had difficulty with anemia 2nd to heavy menses. Is not taking Fe at this time. [] chronic, untreated  -- will discuss need for Fe at next visit  -- check Fe panel at next visit      History of  section complicating pregnancy      Priority: Low     Hx of C/S x 2 at outside facility   1 Arrest  2 Elective repeat ()        History of thyroid disease 2022     Priority: Low     Hx of autoimmune thyroid dz. She is unsure the cause of her hyperthyroidism. She was treated with PTU in the past.  This was diagnosed many years ago. She will get labs done at [facility]. Apparently euthyroid for the past several years. Has not been on meds for years. TSH normal on IPV labs     22 at [facility]: has not been on meds for years. Labs  Free T4 0.9, TSH 0.477      Cortical cataract of both eyes 2022     Priority: Low    Hyperopia of both eyes 2021     Priority: Low    Heart murmur 2018     Priority: Low     22 at [facility]:  Patient reports being diagnosed after leaving hospital in  after having her son.   Has had no issues and does not see a cardiologist         Heart block AV first degree 2014     Priority: Low     2011 1st degree heart block on Reyataz, epzicom; improved off reyataz; ECHO = normal                    Addressed normal pregnancy complaints, reassured and offered suggestions for care  Reviewed gestational age precautions and activity goals/limitations  Nutritional counseling as well as specific goals based on current maternal and fetal status  Options for GERD therapy if becomes symptomatic over course of pregnancy  Gestational age appropriate preventive care regarding communicable disease transmission and vaccines as appropriate (including flu, TDaP, and

## 2022-08-16 ENCOUNTER — TELEPHONE (OUTPATIENT)
Dept: ONCOLOGY | Age: 47
End: 2022-08-16

## 2022-08-16 DIAGNOSIS — D50.9 IRON DEFICIENCY ANEMIA, UNSPECIFIED IRON DEFICIENCY ANEMIA TYPE: Primary | ICD-10-CM

## 2022-08-16 DIAGNOSIS — O09.92 SUPERVISION OF HIGH RISK PREGNANCY IN SECOND TRIMESTER: ICD-10-CM

## 2022-08-16 NOTE — TELEPHONE ENCOUNTER
Spoke to pt regarding lupus dx. Per pt, she has never been dx'ed with lupus. One lab was elevated in the past (not sure which one) but never dx'ed.

## 2022-08-17 ENCOUNTER — HOSPITAL ENCOUNTER (OUTPATIENT)
Dept: INFUSION THERAPY | Age: 47
Discharge: HOME | End: 2022-08-17
Payer: OTHER GOVERNMENT

## 2022-08-17 VITALS
HEART RATE: 97 BPM | DIASTOLIC BLOOD PRESSURE: 74 MMHG | SYSTOLIC BLOOD PRESSURE: 130 MMHG | TEMPERATURE: 98.1 F | OXYGEN SATURATION: 100 %

## 2022-08-17 DIAGNOSIS — D50.9 IRON DEFICIENCY ANEMIA, UNSPECIFIED IRON DEFICIENCY ANEMIA TYPE: Primary | ICD-10-CM

## 2022-08-17 PROCEDURE — 6360000002 HC RX W HCPCS: Performed by: INTERNAL MEDICINE

## 2022-08-17 PROCEDURE — 2580000003 HC RX 258: Performed by: INTERNAL MEDICINE

## 2022-08-17 PROCEDURE — 96365 THER/PROPH/DIAG IV INF INIT: CPT

## 2022-08-17 RX ORDER — SODIUM CHLORIDE 0.9 % (FLUSH) 0.9 %
5-40 SYRINGE (ML) INJECTION PRN
Status: DISCONTINUED | OUTPATIENT
Start: 2022-08-17 | End: 2022-08-18 | Stop reason: HOSPADM

## 2022-08-17 RX ORDER — SODIUM CHLORIDE 9 MG/ML
INJECTION, SOLUTION INTRAVENOUS CONTINUOUS
Status: DISCONTINUED | OUTPATIENT
Start: 2022-08-17 | End: 2022-08-18 | Stop reason: HOSPADM

## 2022-08-17 RX ORDER — HEPARIN SODIUM (PORCINE) LOCK FLUSH IV SOLN 100 UNIT/ML 100 UNIT/ML
500 SOLUTION INTRAVENOUS PRN
OUTPATIENT
Start: 2022-08-24

## 2022-08-17 RX ORDER — SODIUM CHLORIDE 0.9 % (FLUSH) 0.9 %
5-40 SYRINGE (ML) INJECTION PRN
Status: CANCELLED | OUTPATIENT
Start: 2022-08-24

## 2022-08-17 RX ORDER — ALBUTEROL SULFATE 90 UG/1
4 AEROSOL, METERED RESPIRATORY (INHALATION) PRN
Status: DISCONTINUED | OUTPATIENT
Start: 2022-08-17 | End: 2022-08-18 | Stop reason: HOSPADM

## 2022-08-17 RX ORDER — SODIUM CHLORIDE 9 MG/ML
5-250 INJECTION, SOLUTION INTRAVENOUS PRN
OUTPATIENT
Start: 2022-08-24

## 2022-08-17 RX ORDER — ACETAMINOPHEN 325 MG/1
650 TABLET ORAL
Status: ACTIVE | OUTPATIENT
Start: 2022-08-17 | End: 2022-08-17

## 2022-08-17 RX ORDER — ONDANSETRON 2 MG/ML
8 INJECTION INTRAMUSCULAR; INTRAVENOUS
Status: ACTIVE | OUTPATIENT
Start: 2022-08-17 | End: 2022-08-17

## 2022-08-17 RX ORDER — ONDANSETRON 2 MG/ML
8 INJECTION INTRAMUSCULAR; INTRAVENOUS
OUTPATIENT
Start: 2022-08-24

## 2022-08-17 RX ORDER — SODIUM CHLORIDE 9 MG/ML
INJECTION, SOLUTION INTRAVENOUS CONTINUOUS
OUTPATIENT
Start: 2022-08-24

## 2022-08-17 RX ORDER — ACETAMINOPHEN 325 MG/1
650 TABLET ORAL
OUTPATIENT
Start: 2022-08-24

## 2022-08-17 RX ORDER — SODIUM CHLORIDE 9 MG/ML
5-250 INJECTION, SOLUTION INTRAVENOUS PRN
Status: DISCONTINUED | OUTPATIENT
Start: 2022-08-17 | End: 2022-08-18 | Stop reason: HOSPADM

## 2022-08-17 RX ORDER — DIPHENHYDRAMINE HYDROCHLORIDE 50 MG/ML
50 INJECTION INTRAMUSCULAR; INTRAVENOUS
Status: ACTIVE | OUTPATIENT
Start: 2022-08-17 | End: 2022-08-17

## 2022-08-17 RX ORDER — ALBUTEROL SULFATE 90 UG/1
4 AEROSOL, METERED RESPIRATORY (INHALATION) PRN
OUTPATIENT
Start: 2022-08-24

## 2022-08-17 RX ORDER — DIPHENHYDRAMINE HYDROCHLORIDE 50 MG/ML
50 INJECTION INTRAMUSCULAR; INTRAVENOUS
OUTPATIENT
Start: 2022-08-24

## 2022-08-17 RX ORDER — EPINEPHRINE 1 MG/ML
0.3 INJECTION, SOLUTION, CONCENTRATE INTRAVENOUS PRN
OUTPATIENT
Start: 2022-08-24

## 2022-08-17 RX ORDER — SODIUM CHLORIDE 9 MG/ML
5-250 INJECTION, SOLUTION INTRAVENOUS PRN
Status: CANCELLED | OUTPATIENT
Start: 2022-08-24

## 2022-08-17 RX ORDER — EPINEPHRINE 1 MG/ML
0.3 INJECTION, SOLUTION, CONCENTRATE INTRAVENOUS PRN
Status: DISCONTINUED | OUTPATIENT
Start: 2022-08-17 | End: 2022-08-18 | Stop reason: HOSPADM

## 2022-08-17 RX ADMIN — FERRIC CARBOXYMALTOSE INJECTION 750 MG: 50 INJECTION, SOLUTION INTRAVENOUS at 15:03

## 2022-08-17 RX ADMIN — SODIUM CHLORIDE 200 ML/HR: 9 INJECTION, SOLUTION INTRAVENOUS at 14:45

## 2022-08-17 NOTE — PROGRESS NOTES
Arrived to the [facility]. Injectafer infusion completed. Patient tolerated well. Any issues or concerns during appointment: No.  Discharged home in stable condition.

## 2022-08-25 ENCOUNTER — HOSPITAL ENCOUNTER (OUTPATIENT)
Dept: INFUSION THERAPY | Age: 47
Discharge: HOME | End: 2022-08-25
Payer: OTHER GOVERNMENT

## 2022-08-25 VITALS
OXYGEN SATURATION: 100 % | TEMPERATURE: 98.5 F | RESPIRATION RATE: 16 BRPM | DIASTOLIC BLOOD PRESSURE: 80 MMHG | HEART RATE: 88 BPM | SYSTOLIC BLOOD PRESSURE: 127 MMHG

## 2022-08-25 DIAGNOSIS — D50.9 IRON DEFICIENCY ANEMIA, UNSPECIFIED IRON DEFICIENCY ANEMIA TYPE: Primary | ICD-10-CM

## 2022-08-25 PROCEDURE — 2580000003 HC RX 258: Performed by: INTERNAL MEDICINE

## 2022-08-25 PROCEDURE — 96365 THER/PROPH/DIAG IV INF INIT: CPT

## 2022-08-25 PROCEDURE — 6360000002 HC RX W HCPCS: Performed by: INTERNAL MEDICINE

## 2022-08-25 RX ORDER — SODIUM CHLORIDE 9 MG/ML
INJECTION, SOLUTION INTRAVENOUS CONTINUOUS
OUTPATIENT
Start: 2022-08-25

## 2022-08-25 RX ORDER — EPINEPHRINE 1 MG/ML
0.3 INJECTION, SOLUTION, CONCENTRATE INTRAVENOUS PRN
OUTPATIENT
Start: 2022-08-25

## 2022-08-25 RX ORDER — SODIUM CHLORIDE 9 MG/ML
5-250 INJECTION, SOLUTION INTRAVENOUS PRN
Status: CANCELLED | OUTPATIENT
Start: 2022-08-25

## 2022-08-25 RX ORDER — HEPARIN SODIUM (PORCINE) LOCK FLUSH IV SOLN 100 UNIT/ML 100 UNIT/ML
500 SOLUTION INTRAVENOUS PRN
OUTPATIENT
Start: 2022-08-25

## 2022-08-25 RX ORDER — ONDANSETRON 2 MG/ML
8 INJECTION INTRAMUSCULAR; INTRAVENOUS
OUTPATIENT
Start: 2022-08-25

## 2022-08-25 RX ORDER — ALBUTEROL SULFATE 90 UG/1
4 AEROSOL, METERED RESPIRATORY (INHALATION) PRN
OUTPATIENT
Start: 2022-08-25

## 2022-08-25 RX ORDER — SODIUM CHLORIDE 9 MG/ML
5-250 INJECTION, SOLUTION INTRAVENOUS PRN
Status: DISCONTINUED | OUTPATIENT
Start: 2022-08-25 | End: 2022-08-26 | Stop reason: HOSPADM

## 2022-08-25 RX ORDER — DIPHENHYDRAMINE HYDROCHLORIDE 50 MG/ML
50 INJECTION INTRAMUSCULAR; INTRAVENOUS
OUTPATIENT
Start: 2022-08-25

## 2022-08-25 RX ORDER — SODIUM CHLORIDE 0.9 % (FLUSH) 0.9 %
5-40 SYRINGE (ML) INJECTION PRN
OUTPATIENT
Start: 2022-08-25

## 2022-08-25 RX ORDER — SODIUM CHLORIDE 0.9 % (FLUSH) 0.9 %
5-40 SYRINGE (ML) INJECTION PRN
Status: DISCONTINUED | OUTPATIENT
Start: 2022-08-25 | End: 2022-08-26 | Stop reason: HOSPADM

## 2022-08-25 RX ORDER — ACETAMINOPHEN 325 MG/1
650 TABLET ORAL
OUTPATIENT
Start: 2022-08-25

## 2022-08-25 RX ORDER — SODIUM CHLORIDE 9 MG/ML
5-250 INJECTION, SOLUTION INTRAVENOUS PRN
OUTPATIENT
Start: 2022-08-25

## 2022-08-25 RX ADMIN — SODIUM CHLORIDE, PRESERVATIVE FREE 10 ML: 5 INJECTION INTRAVENOUS at 15:25

## 2022-08-25 RX ADMIN — SODIUM CHLORIDE, PRESERVATIVE FREE 10 ML: 5 INJECTION INTRAVENOUS at 14:40

## 2022-08-25 RX ADMIN — FERRIC CARBOXYMALTOSE INJECTION 750 MG: 50 INJECTION, SOLUTION INTRAVENOUS at 14:42

## 2022-08-25 RX ADMIN — SODIUM CHLORIDE 50 ML/HR: 9 INJECTION, SOLUTION INTRAVENOUS at 14:40

## 2022-08-25 NOTE — PROGRESS NOTES
8/25/2022  Pt to Infusion without complaints. Pt received Injectafer per order, tolerated well. No follow up with Infusion at this time. Patient instructed to call provider with any issues/concerns. Discharged home.

## 2022-09-07 ENCOUNTER — ROUTINE PRENATAL (OUTPATIENT)
Dept: OBGYN CLINIC | Age: 47
End: 2022-09-07

## 2022-09-07 VITALS — BODY MASS INDEX: 27.25 KG/M2 | WEIGHT: 174 LBS | SYSTOLIC BLOOD PRESSURE: 114 MMHG | DIASTOLIC BLOOD PRESSURE: 78 MMHG

## 2022-09-07 DIAGNOSIS — U07.1 COVID-19 AFFECTING PREGNANCY IN FIRST TRIMESTER: ICD-10-CM

## 2022-09-07 DIAGNOSIS — Z86.39 HISTORY OF THYROID DISEASE: ICD-10-CM

## 2022-09-07 DIAGNOSIS — O09.522 HIGH RISK PREGNANCY, MULTIGRAVIDA OF ADVANCED MATERNAL AGE IN SECOND TRIMESTER: ICD-10-CM

## 2022-09-07 DIAGNOSIS — O99.012 ANEMIA AFFECTING PREGNANCY IN SECOND TRIMESTER: ICD-10-CM

## 2022-09-07 DIAGNOSIS — Z13.1 SCREENING FOR DIABETES MELLITUS (DM): Primary | ICD-10-CM

## 2022-09-07 DIAGNOSIS — Z13.1 SCREENING FOR DIABETES MELLITUS (DM): ICD-10-CM

## 2022-09-07 DIAGNOSIS — O34.219 HISTORY OF CESAREAN SECTION COMPLICATING PREGNANCY: ICD-10-CM

## 2022-09-07 DIAGNOSIS — O98.712 HIV DISEASE AFFECTING PREGNANCY IN SECOND TRIMESTER: ICD-10-CM

## 2022-09-07 DIAGNOSIS — Z98.890 HISTORY OF MYOMECTOMY: ICD-10-CM

## 2022-09-07 DIAGNOSIS — O98.511 COVID-19 AFFECTING PREGNANCY IN FIRST TRIMESTER: ICD-10-CM

## 2022-09-07 DIAGNOSIS — O09.812 PREGNANCY RESULTING FROM IN VITRO FERTILIZATION IN SECOND TRIMESTER: ICD-10-CM

## 2022-09-07 PROCEDURE — 99902 PR PRENATAL VISIT: CPT | Performed by: OBSTETRICS & GYNECOLOGY

## 2022-09-07 RX ORDER — DOCUSATE SODIUM 100 MG/1
100 CAPSULE, LIQUID FILLED ORAL 2 TIMES DAILY
Qty: 60 CAPSULE | Refills: 0 | Status: SHIPPED | OUTPATIENT
Start: 2022-09-07 | End: 2022-10-06

## 2022-09-07 RX ORDER — POLYETHYLENE GLYCOL 3350 17 G/17G
17 POWDER, FOR SOLUTION ORAL DAILY
Qty: 1530 G | Refills: 1 | Status: SHIPPED | OUTPATIENT
Start: 2022-09-07 | End: 2022-10-07

## 2022-09-07 RX ORDER — SODIUM PHOSPHATE, DIBASIC AND SODIUM PHOSPHATE, MONOBASIC 7; 19 G/133ML; G/133ML
1 ENEMA RECTAL
Refills: 5 | COMMUNITY
Start: 2022-09-07 | End: 2022-09-07

## 2022-09-07 NOTE — PROGRESS NOTES
COLBY [id number].  24w6d   Denies LOF, VB, Ctxs. Good FM. Struggling with constipation. Had to go ER for soap suds enema. Also very concerned about spinal due to feeling a lot with other deliveries. Asking about options. Vitals:    09/07/22 1537   BP: 114/78        High risk pregnancy, multigravida of advanced maternal age in second trimester  S/p normal anatomy with MFM  Limited echo.  FU scan scheduled with MFM    GTT and CBC today     Referral sent for anesthesia consult     Anemia affecting pregnancy in second trimester  S/p hematology and IV iron infusions     HIV disease affecting pregnancy in second trimester  Stable on HARRT - last VL neg  MFM following with serial growths      Orders Placed This Encounter   Procedures    Glucose tolerance, 1 hour only, single test     Standing Status:   Future     Number of Occurrences:   1     Standing Expiration Date:   9/7/2023    CBC     Standing Status:   Future     Standing Expiration Date:   9/7/2023          Kisha Crawford DO

## 2022-09-08 DIAGNOSIS — Z13.1 SCREENING FOR DIABETES MELLITUS (DM): Primary | ICD-10-CM

## 2022-09-08 LAB — GLUCOSE 1 HOUR: 112 MG/DL

## 2022-09-09 ENCOUNTER — ROUTINE PRENATAL (OUTPATIENT)
Dept: OBGYN CLINIC | Age: 47
End: 2022-09-09
Payer: OTHER GOVERNMENT

## 2022-09-09 VITALS — DIASTOLIC BLOOD PRESSURE: 64 MMHG | SYSTOLIC BLOOD PRESSURE: 113 MMHG

## 2022-09-09 DIAGNOSIS — O09.812 PREGNANCY RESULTING FROM IN VITRO FERTILIZATION IN SECOND TRIMESTER: ICD-10-CM

## 2022-09-09 DIAGNOSIS — D25.9 UTERINE FIBROID IN PREGNANCY: ICD-10-CM

## 2022-09-09 DIAGNOSIS — Z86.39 HISTORY OF THYROID DISEASE: ICD-10-CM

## 2022-09-09 DIAGNOSIS — U07.1 COVID-19 AFFECTING PREGNANCY IN FIRST TRIMESTER: ICD-10-CM

## 2022-09-09 DIAGNOSIS — O34.219 HISTORY OF CESAREAN SECTION COMPLICATING PREGNANCY: ICD-10-CM

## 2022-09-09 DIAGNOSIS — O09.522 HIGH RISK PREGNANCY, MULTIGRAVIDA OF ADVANCED MATERNAL AGE IN SECOND TRIMESTER: ICD-10-CM

## 2022-09-09 DIAGNOSIS — Z98.890 HISTORY OF MYOMECTOMY: ICD-10-CM

## 2022-09-09 DIAGNOSIS — R01.1 HEART MURMUR: ICD-10-CM

## 2022-09-09 DIAGNOSIS — Z3A.25 25 WEEKS GESTATION OF PREGNANCY: ICD-10-CM

## 2022-09-09 DIAGNOSIS — I44.0 HEART BLOCK AV FIRST DEGREE: ICD-10-CM

## 2022-09-09 DIAGNOSIS — O98.511 COVID-19 AFFECTING PREGNANCY IN FIRST TRIMESTER: ICD-10-CM

## 2022-09-09 DIAGNOSIS — O98.712 HIV DISEASE AFFECTING PREGNANCY IN SECOND TRIMESTER: Primary | ICD-10-CM

## 2022-09-09 DIAGNOSIS — O34.10 UTERINE FIBROID IN PREGNANCY: ICD-10-CM

## 2022-09-09 DIAGNOSIS — O99.012 ANEMIA AFFECTING PREGNANCY IN SECOND TRIMESTER: ICD-10-CM

## 2022-09-09 PROCEDURE — 93325 DOPPLER ECHO COLOR FLOW MAPG: CPT | Performed by: OBSTETRICS & GYNECOLOGY

## 2022-09-09 PROCEDURE — 99214 OFFICE O/P EST MOD 30 MIN: CPT | Performed by: OBSTETRICS & GYNECOLOGY

## 2022-09-09 PROCEDURE — 76816 OB US FOLLOW-UP PER FETUS: CPT | Performed by: OBSTETRICS & GYNECOLOGY

## 2022-09-09 PROCEDURE — 76825 ECHO EXAM OF FETAL HEART: CPT | Performed by: OBSTETRICS & GYNECOLOGY

## 2022-09-09 ASSESSMENT — PATIENT HEALTH QUESTIONNAIRE - PHQ9
SUM OF ALL RESPONSES TO PHQ QUESTIONS 1-9: 0
SUM OF ALL RESPONSES TO PHQ QUESTIONS 1-9: 0
2. FEELING DOWN, DEPRESSED OR HOPELESS: 0
SUM OF ALL RESPONSES TO PHQ QUESTIONS 1-9: 0
1. LITTLE INTEREST OR PLEASURE IN DOING THINGS: 0
SUM OF ALL RESPONSES TO PHQ QUESTIONS 1-9: 0
SUM OF ALL RESPONSES TO PHQ9 QUESTIONS 1 & 2: 0

## 2022-09-09 NOTE — PROGRESS NOTES
MFM Consultation    Jaylon Salmeron (: 1975) is a 41 y.o. [id number] at 25w1d with 2022, by Ultrasound. Presents for evaluation of the following chief complaint(s):  Pregnancy Ultrasound and High Risk Pregnancy (AMA, HIV, Anemia, C/S X2, fibroids, +covid in pregnancy )     Patient is working full time with [employer]. Scheduled to see Primary OB [facility]) on 22. Denies HA or edema. Denies preeclamptic symptoms. Reports fetal movement  No bleeding, LOF, cramping, ctxs, or vaginal pressure. Interval history since prior appt reviewed and updated as indicated. Doing well. No significant concerns. Reviewed risks of preE with donor egg IVF, particularly with AMA status. HIV stable, will have next labs in 2-3 months. Review of Systems - per HPI; otherwise unremarkable. Exam:     Vitals:    22 1027   BP: 113/64       Ultrasound: Please see formal ultrasound report under imaging tab. ASSESSMENT/PLAN:  Patient Active Problem List    Diagnosis Date Noted    High risk pregnancy, multigravida of advanced maternal age in second trimester 2022     Priority: High     Conceived via IVF with donor age Sue Garrett)  Plans for first screen with MFM  Asa 162 mg qhs   22 at [facility]: Normal NT 1.4 mm and nasal bone. IVF Donor Egg from 21year old, low risk of aneuploidy. Genetic counseling done by MD.  Declines Genetic Testing. Patient with multiple Medical Conditions that complicate this pregnancy. See each individual Diagnosis Overview for details. Talked with patient about each diagnosis and plan. Patient has excellent knowledge of her conditions. She agrees with POC. Summary of Current Plan  1) History of Myomectomy-Hysteroscopic removal. Multiple small fibroids noted. Increase vascularity see at lower segment. Will follow for PAS with each US. 2) HIV Positive, long standing condition- On HIIT Therapy, last Quant undetectable. Defer Management to ID at [facility]. Will let Neonatologists know prior to delivery. 3) Anemia-Severe Anemia, not tolerating po Fe, N/V. Needs IV Fe in 2nd trimester. 4) Covid-Recent infection, improving, no SOB. 5) Advanced AMA-Donor egg of 21year old, very low risk of Aneuploidy with Nl NT/NB. Very High Risk of PreE, taking 162 mgs ASA.  08/11/22 at [facility]: Normal anatomy; limited echo, AC 96 %, CHRIS WNL, Negative NIPT  09/09/22 at [facility]: Appropriate fetal growth; Normal repeat echo; AC 88%, Overall 72%, CHRIS 16 cm, Dopplers WNL    F/U MFM 4 weeks growth, echo and PAS eval          Other forms of systemic lupus erythematosus ([facility] [address] 08/09/2022     Priority: Medium    COVID-19 affecting pregnancy in first trimester 06/16/2022     Priority: Medium     06/16/22 at [facility]:  Testing positive for Covid19 on June 9. Symptoms have been mild. Headaches, fevers now just feeling congested, no SOB. Vital signs 127/88, P82, Pulse Ox 100%. 08/11/22 at [facility]:  Denies symptoms  No further testing needed        Anemia affecting pregnancy in second trimester 03/28/2018     Priority: Medium     Severe anemia noted on IPV   Hg 9.0, microcytic   Iron panel/electrophoresis today and start iron BID. Referral to heme/onc for evaluation of IV iron     2009 - noted to be Fe deficient w/ anemia, reporting menorrhagia  2014-02 fe sat = 6% hgb = 8.9  2014-05 h/h = 13/41- resolved    History: has had difficulty with anemia 2nd to heavy menses. Is not taking Fe at this time. [] chronic, untreated  -- will discuss need for Fe at next visit  -- check Fe panel at next visit    06/16/22 at [facility]: HGB on 6/6 9.0. Was started on iron BID.    7/07 unable to tolerate PO iron. Vomits with each use.   08/11/22 at [facility]: Scheduled for injectafer injections with Hematology on 8/17/22                Pregnancy resulting from in vitro fertilization in second trimester 01/14/2016     Priority: Medium     Donor egg.  Embryos were not genetically tested   Asa 162 qhs   Planning first screen with [facility]  06/16/22 at [facility]: Denies genetic testing today. Patient reassured by ultrasound today   9/9/2022 [facility]: reassuring fetal growth and followup anatomy      HIV disease affecting pregnancy in second trimester 05/27/2014     Priority: Medium     Dx in 2003   Followed by  ID    2014-09 cd4= 622 (36%)  hiv rna =nd  ----------  2004-02 heterosexual exposure with subsequent pharygitis; followed by recurrent vaginal yeast infections abnormal pap  2004-09 Diagnosis 2nd to recurrent vaginal yeast infections. 2004-10 Liliya Revaft, norvir; cd4= 224, hiv rna = [id number]  2005-03 epzicom, reyataz (unboosted) [hyperpigmentation 2nd to [address]  2005: tx w/ trizivir/ norvir, reyataz when pregnant  2006: epzicom, reyataz (unboosted) >> hiv rna = undetected and cd4 = 300-400  2009-02: formal safe sex/HIV transmission partner counseling by provider  2011-09 epzicom Mary Castillo >> epzicom, rezista, norvir bc of 1st degree heart block  2012-08 hiv rna <20 cd4= 362  2014-05 [facility] 1st visit HX: Diagnosed in 2003 and was started on treatment very soon; Antiretroviral history = has been treated with truvada but this causes skin discoloration; norvir = gel cap = dificult to swallow; reyataz = but had more medications at the time and then was changed to simplify regimen. Exposure  to infected male- father of her son; now  since 2006;  (hiv-) is tested regularly; HIV RNA = not detected    -- monitor labs for efficacy and toxicity of antiviral therapy  -- refill darunavir, norvir, truvada  -- rtn in 4-6m    06/16/22 at [facility]: Patient was diagnosed with HIV in 2003. Managed with [employer] every 6 months. Next appointment is in July. Last appointment viral load per patient was undetectable. Taking Epzicom, Prezista, and Norvir daily. Would not change any treatment, watch for FGR with serial US.     9/9/2022 [facility]: labs reviewed. VL undetectable 6/13, reassuring CD4 count. Continues to do well with HAART.    Reviewed delivery medication plans- remain on current dose;  will need AZT         Anemia, iron deficiency 2014     Priority: Medium      - noted to be Fe deficient w/ anemia, reporting menorrhagia  2014 fe sat = 6% hgb = 8.9  2014 h/h = - resolved    History: has had difficulty with anemia 2nd to heavy menses. Is not taking Fe at this time. [] chronic, untreated  -- will discuss need for Fe at next visit  -- check Fe panel at next visit      History of  section complicating pregnancy      Priority: Low     Hx of C/S x 2 at outside facility   1 Arrest  2 Elective repeat ()        History of myomectomy 2022     Priority: Low     [facility] myomectomy at outside facility in  in [facility]  Had term repeat C/S following surgery so assuming not in contractile portion         Cortical cataract of both eyes 2022     Priority: Low    Hyperopia of both eyes 2021     Priority: Low    Heart murmur 2018     Priority: Low     22 at [facility]:  Patient reports being diagnosed after leaving hospital in  after having her son. Has had no issues and does not see a cardiologist         Uterine fibroid in pregnancy 2015     Priority: Low       22 at [facility]: several small fibroids seen on ultrasound today       Heart block AV first degree 2014     Priority: Low     2011 1st degree heart block on Reyataz, epzicom; improved off reyataz; ECHO = normal                History of thyroid disease 2022     Hx of autoimmune thyroid dz. She is unsure the cause of her hyperthyroidism. She was treated with PTU in the past.  This was diagnosed many years ago. [place] [facility]. Apparently euthyroid for the past several years. Has not been on meds for years. TSH normal on IPV labs     22 at [facility]: has not been on meds for years. Labs  Free T4 0.9, TSH 0.477         Patient with known HIV positive status.      Currently cared for by [facility] and they will follow her long term during pregnancy and after delivery. Patient should have  RNA Viral load or CD4 count done through their office every 4-12 weeks. We will follow her as much as needed, however we will defer HIV management to that office. At this time, no indication that ART use increases risk of  delivery. Data regarding  ART use and IUGR/SGA are mixed. Therefore recommend surveillance of growth in the 3rd trimester. Little data regarding impact of ART on rates of stillbirth. In general, the same regimens as recommended for treatment of non-pregnant adults should be used in pregnant women if appropriate drug exposure is achieved in pregnancy, unless there are known adverse effects for women, fetuses, or infants that outweigh benefits. In most cases, women who present for obstetric care on fully suppressive antiretroviral regimens should continue their current regimens. Pharmacokinetic changes in pregnancy may lead to lower plasma levels of drugs and necessitate increased dosages, more frequent dosing, or boosting, especially of protease inhibitors. HIV RNA levels also should be assessed at approximately 34 to 39 weeks gestation to inform decisions about mode of delivery    As per ACOG, if viral load is > 1000 copies, elective C/S should be offered at 38 weeks. Pt aware of risk of delivery prior to this based on risk for HTN     Patient is compliant, well controlled on her current antiretroviral regimen, and viral load is <1000, therefore may continue her po medications in labor. The most recent recommendations by NIH do not require addition of zidovudine (AZT) during labor unless viral load increases to >1000 or there is a concern for noncompliance. Avoid use of AROM, fetal scalp electrode, operative delivery to minimize vertical transmission.      Recommend, notification of NICU prior to delivery as  antiretroviral prophylaxis should ideally be started within 6-12 hours after birth. A 4-week  AZT prophylaxis regimen can be used for full-term infants when the mother has received a standard antiretroviral therapy regimen during pregnancy with sustained viral suppression and there are no concerns related to maternal adherence. Otherwise, a 6-week course as part of a combination infant prophylaxis regimen is recommended. As patient is on a protease inhibitor, methergine should be used only if no alternative treatments for postpartum hemorrhage are available and the need for pharmacologic treatment outweighs the risks. If methergine is used, it should be administered in the lowest effective dose for the shortest possible duration. Additional uterotonic agents may be needed because of the potential for decreased methergine levels and inadequate treatment effect. Recommendations based on guidelines produced by aidsinfo. NIH. Gov (Last updated: 20). 2) IVF/ICSI    Pregnancies as a result of IVF/ICSI are at increased risk of abnormal placentation, preeclampsia, risk of birth defects (including major) and heart defects. It is unclear how much the risks of birth defects are due to underlying parental factors as opposed to IVF technology itself. However, level 2 anatomy evaluation and fetal echo are recommended. There are increased rates of lower birth weight and  delivery. Genetic imprinting disorders associated with IVF/ICSI include Jennifer-Wiedemann, Silver-Mikael syndrome, Angelman syndrome, and Prader-Willi syndrome. These are likely due to methylation dynamics during embryologic development. There is a possible increase in childhood cancer associated in children resulting from IVF/ICSI pregnancies. Pregnancies as a result of frozen embryo transfer are at an increased risk for preeclampsia, postpartum hemorrhage, and abnormal placentation.  This is thought to be secondary to differences in the development of maternal-fetal interface and/or absence of vasoactive substances typically released from corpus luteum. Pregnancies achieved with oocyte/embryo donation have more than double the risk of preeclampsia when compared with other methods of ART and a more than fourfold increased risk compared with natural conception, likely secondary, to immunologic naivety. Additionally, these pregnancies have increased risks for  delivery and  delivery compared to other ART pregnancies. Maternal complications including pp hemorrhage, ICU admission, and sepsis are increased risk with IVF/ICSI. There is up to an four-fold increase in risk of stillbirth/ mortality with IVF/ICSI as opposed to other ART pregnancies. Addressed normal pregnancy complaints, reassured and offered suggestions for care  Reviewed gestational age precautions and activity goals/limitations  Nutritional counseling as well as specific goals based on current maternal and fetal status  Options for GERD therapy if becomes symptomatic over course of pregnancy  Gestational age appropriate preventive care regarding communicable disease transmission and vaccines as appropriate (including flu, TDaP, and COVID.)  Additional plans and concerns as documented in problem list.   All questions answered and concerns discussed. An electronic signature was used to authenticate this note. Jj Mcdermott MD    I have spent 45 minutes reviewing previous notes, test results and face to face with the patient discussing the diagnosis and importance of compliance with the treatment plan, in addition to ultrasound findings, as well as documenting on the day of the visit (2022).     Return to [facility] in 4-6 weeks  for maternal and fetal evaluation      Patient Active Problem List   Diagnosis Code    HIV disease affecting pregnancy in second trimester O98.712    High risk pregnancy, multigravida of advanced maternal age in second trimester K79.226    History of  section complicating pregnancy Y64.633    History of myomectomy Z98.890    Anemia affecting pregnancy in second trimester O99.012    History of thyroid disease Z86.39    Pregnancy resulting from in vitro fertilization in second trimester O09.812    Anemia, iron deficiency D50.9    Cortical cataract of both eyes H26.9    Uterine fibroid in pregnancy O34.10, D25.9    Heart block AV first degree I44.0    Heart murmur R01.1    Hyperopia of both eyes H52.03    COVID-19 affecting pregnancy in first trimester O98.511, U07.1    Other forms of systemic lupus erythematosus ([address] M32.8

## 2022-09-15 ASSESSMENT — ENCOUNTER SYMPTOMS
VOMITING: 0
VOICE CHANGE: 0
NAUSEA: 0
CHEST TIGHTNESS: 0
SHORTNESS OF BREATH: 0
ABDOMINAL PAIN: 0
HEMOPTYSIS: 0
BLOOD IN STOOL: 0
DIARRHEA: 0
WHEEZING: 0
SCLERAL ICTERUS: 0
TROUBLE SWALLOWING: 0
CONSTIPATION: 0
ABDOMINAL DISTENTION: 0
SORE THROAT: 0

## 2022-09-15 NOTE — PROGRESS NOTES
6/20/22 were significant for retic count 2.6, immature retic fraction 26.2, and retic hemoglobin concentration 27. Hemoglobinopathy evaluation drawn a 2nd time on 6/20/22 was again WNL. Patient followed up with OB on 7/7/22. She reported that she is unable to take PO iron secondary to vomiting. Referral was placed to [facility], per OB, for hematology evaluation and treatment of anemia. At consultation, we discussed pathophysiology of hematopoesis in general going over anemia in pregnancy and changes to blood during pregnancy. She wished to p/w IV iron and we reviewed options, Se and risks. She VU. Today, she is here for FU after IV iron. She feels better and tolerated it well. She is here with her . Labs reviewed and we discussed plan going forward for the rest of her pregnancy.          6/6/22 16:19 6/20/22 08:12   WBC 4.1 (L)     RBC 4.04 (L)     Hemoglobin Quant 9.0 (L) 8.4 (L)   Hematocrit 31.4 (L)     MCV 77.7 (L)     MCH 22.3 (L)     MCHC 28.7 (L)     MPV 10.9     RDW 16.7 (H)     Platelet Count 078     Absolute Mono # 0.3     Eosinophils % 1     Basophils Absolute 0.0     Differential Type AUTOMATED     Seg Neutrophils 61     Segs Absolute 2.5     Lymphocytes 30     Absolute Lymph # 1.2     Monocytes 7     Absolute Eos # 0.1     Basophils 1     Immature Granulocytes 0     Nucleated Red Blood Cells 0.00     RETIC HGB CONC.   27 (L)   Absolute Immature Granulocyte 0.0          6/20/22 08:12   Ferritin 11   Iron 24 (L)   TIBC 468 (H)        6/20/22 08:12   IMMATURE RETIC FRACTION 26.2 (H)   Absolute Retic # [id number]      HEMOGLOBINOPATHY EVALUATION       Other Pertinent Information:  04/08/2021 (COVID-19, PFIZER PURPLE top, DILUTE for use, (age 15 y+), 30mcg/0.3mL)  05/06/2021 (COVID-19, PFIZER PURPLE top, DILUTE for use, (age 15 y+), 30mcg/0.3mL)  12/16/2021 (COVID-19, PFIZER PURPLE top, DILUTE for use, (age 15 y+), 30mcg/0.3mL)    8/2022 heme consultation   9/2022 FU after IV iron - Hb up to 11.8, no evid of hemolysis, TSAT 22%       Family History   Problem Relation Age of Onset    Prostate Cancer Paternal Grandfather     Diabetes Mother     No Known Problems Father     Alzheimer's Disease Maternal Grandmother     No Known Problems Maternal Grandfather     No Known Problems Paternal Grandmother     Breast Cancer Neg Hx     Colon Cancer Neg Hx     Hypertension Neg Hx       Social History     Socioeconomic History    Marital status:      Spouse name: None    Number of children: None    Years of education: None    Highest education level: None   Tobacco Use    Smoking status: Never    Smokeless tobacco: Never   Vaping Use    Vaping Use: Never used   Substance and Sexual Activity    Alcohol use: Not Currently    Drug use: Never    Sexual activity: Not Currently     Partners: Male   Social History Narrative    GYN: 5/30/2019  Abnormal Pap Smears: yes  Cervical Procedures: no  STDs: yes  chlamydia  and HIV (through 1st son's biological father)          Review of Systems   Constitutional:  Positive for fatigue. Negative for appetite change, chills, diaphoresis, fever and unexpected weight change. HENT:   Negative for hearing loss, mouth sores, nosebleeds, sore throat, trouble swallowing and voice change. Eyes:  Negative for icterus. Respiratory:  Negative for chest tightness, hemoptysis, shortness of breath and wheezing. Cardiovascular:  Negative for chest pain, leg swelling and palpitations. Gastrointestinal:  Negative for abdominal distention, abdominal pain, blood in stool, constipation, diarrhea, nausea and vomiting. Endocrine: Negative for hot flashes. Genitourinary:  Negative for difficulty urinating, frequency, vaginal bleeding and vaginal discharge. Musculoskeletal:  Negative for arthralgias, flank pain, gait problem and myalgias. Skin:  Negative for itching, rash and wound. Neurological:  Negative for dizziness, extremity weakness, gait problem, headaches and numbness. Psychiatric/Behavioral:  Negative for confusion and depression. The patient is not nervous/anxious. No Known Allergies  Past Medical History:   Diagnosis Date    Abnormal Pap smear of cervix     Acute left-sided low back pain without sciatica 2021    Onset  at least 2 weeks ago. Reports intermittent pain that can last for at least 3 hours. Denies any trauma or injury. No leg weakness or swelling. She tried taking Motrin to help with the pain. No abdominal pain or nausea. Noticed the pain occurred after being intimate  with her spouse. Will order a urinalysis and follow up pending results. Prescribe a trial of Flexeril 10 mg at bedtime and Volt    Anemia     prescribed iron to take daily     Anemia, iron deficiency 2014    Formatting of this note might be different from the original.  - noted to be Fe deficient w/ anemia, reporting menorrhagia 2014 fe sat = 6% hgb = 8.9 2014 h/h = - resolved  History: has had difficulty with anemia 2nd to heavy menses. Is not taking Fe at this time. [] chronic, untreated -- will discuss need for Fe at next visit -- check Fe panel at next visit    Autoimmune disorder ([address]     HIV disease ([address]     Hyperthyroidism     No meds for several years    Infection due to yeast 2019    Complains of a vaginal discharge and feeling like there is a clump of something in her vagina. She does have some itching. She relates she has a discharge frequently after she has intercourse with her  whether he uses a condom or not. We treated her for BV in February of this year and her symptoms resolved at that time.   On exam she does have a thick white discharge in the vault and wet     Systemic lupus erythematosus ([address]      Past Surgical History:   Procedure Laterality Date     SECTION      x 2     LAP,TUBAL CAUTERY      MYOMECTOMY      laparoscopic    OTHER SURGICAL HISTORY      tubal reversal     WISDOM TOOTH EXTRACTION       Current Outpatient Medications   Medication Sig Dispense Refill    docusate sodium (COLACE) 100 MG capsule Take 1 capsule by mouth 2 times daily 60 capsule 0    polyethylene glycol (GLYCOLAX) 17 GM/SCOOP powder Take 17 g by mouth daily 1530 g 1    aspirin EC 81 MG EC tablet Take 2 tablets by mouth in the morning. 60 tablet 10    Prenatal Vit-Fe Fumarate-FA (PRENATAL VITAMINS PO) prenatal vit 10-iron-folic-dha   1 tab daily      abacavir-lamiVUDine (EPZICOM) 600-300 MG per tablet Take 1 tablet by mouth daily      darunavir (PREZISTA) 800 MG TABS tablet Take by mouth      ritonavir (NORVIR) 100 MG tablet Take 100 mg by mouth daily       ferrous sulfate (IRON 325) 325 (65 Fe) MG tablet Take 1 tablet by mouth 2 times daily (Patient not taking: Reported on 9/16/2022) 60 tablet 5    Doxylamine Succinate, Sleep, (UNISOM PO) Take by mouth (Patient not taking: Reported on 9/16/2022)       No current facility-administered medications for this visit. No flowsheet data found. OBJECTIVE:  /74 (Site: Right Upper Arm, Position: Sitting, Cuff Size: Medium Adult)   Pulse 82   Temp 98.3 °F (36.8 °C) (Oral)   Resp 18   Ht 5' 7\" (1.702 m)   Wt 174 lb 6.4 oz (79.1 kg)   SpO2 100%   BMI 27.31 kg/m²       ECOG PERFORMANCE STATUS - 0-Fully active, able to carry on all pre-disease performance without restriction. Pain - 0 - No pain/10. None/Minimal pain - not affecting QOL     Fatigue - No flowsheet data found. Distress - No flowsheet data found. Physical Exam  Vitals reviewed. Exam conducted with a chaperone present. Constitutional:       General: She is not in acute distress. Appearance: Normal appearance. She is normal weight. She is not ill-appearing or toxic-appearing. HENT:      Head: Normocephalic and atraumatic. Nose: Nose normal. No congestion. Mouth/Throat:      Mouth: Mucous membranes are moist.   Eyes:      General: No scleral icterus.      Extraocular Movements: Extraocular movements intact. Conjunctiva/sclera: Conjunctivae normal.      Pupils: Pupils are equal, round, and reactive to light. Cardiovascular:      Rate and Rhythm: Normal rate and regular rhythm. Heart sounds: No murmur heard. Pulmonary:      Effort: Pulmonary effort is normal. No respiratory distress. Breath sounds: Normal breath sounds. No wheezing or rales. Abdominal:      Comments: Gravid    Musculoskeletal:         General: Normal range of motion. Cervical back: Normal range of motion. Right lower leg: No edema. Left lower leg: No edema. Lymphadenopathy:      Cervical: No cervical adenopathy. Upper Body:      Right upper body: No supraclavicular or axillary adenopathy. Left upper body: No supraclavicular or axillary adenopathy. Skin:     General: Skin is warm and dry. Coloration: Skin is not jaundiced or pale. Findings: No rash. Neurological:      General: No focal deficit present. Mental Status: She is alert and oriented to person, place, and time. Gait: Gait normal.   Psychiatric:         Behavior: Behavior normal.         Thought Content: Thought content normal.        Labs:  No results found for this or any previous visit (from the past 168 hour(s)). Imaging: reviewed       ASSESSMENT:     Diagnosis Orders   1. Iron deficiency anemia, unspecified iron deficiency anemia type  Comprehensive Metabolic Panel    CBC with Auto Differential    Ferritin    Transferrin Saturation          Ms. Sae Jimenez is here for FU of anemia in pregnancy complicated by AMA, HIV and PATY. Anemia in pregnancy     - great response to tx so far; Hb normlaized and we will now monitor her for rest of pregnancy to determine if additional iron is needed  - labs discussed - TSAT 22% and ferritin up to 223   - no evidence of hemolysis   - has hx of SLE - anti-Ro/La Abs checked - Fetus exposed to these abs can be at increased risk of heart block - she has increased La Ab at 4.9.   I

## 2022-09-16 ENCOUNTER — HOSPITAL ENCOUNTER (OUTPATIENT)
Dept: LAB | Age: 47
Discharge: HOME | End: 2022-09-19
Payer: OTHER GOVERNMENT

## 2022-09-16 ENCOUNTER — OFFICE VISIT (OUTPATIENT)
Dept: ONCOLOGY | Age: 47
End: 2022-09-16
Payer: OTHER GOVERNMENT

## 2022-09-16 VITALS
WEIGHT: 174.4 LBS | TEMPERATURE: 98.3 F | BODY MASS INDEX: 27.37 KG/M2 | DIASTOLIC BLOOD PRESSURE: 74 MMHG | SYSTOLIC BLOOD PRESSURE: 117 MMHG | RESPIRATION RATE: 18 BRPM | HEART RATE: 82 BPM | HEIGHT: 67 IN | OXYGEN SATURATION: 100 %

## 2022-09-16 DIAGNOSIS — M32.8 OTHER FORMS OF SYSTEMIC LUPUS ERYTHEMATOSUS, UNSPECIFIED ORGAN INVOLVEMENT STATUS (HCC): ICD-10-CM

## 2022-09-16 DIAGNOSIS — D50.9 IRON DEFICIENCY ANEMIA, UNSPECIFIED IRON DEFICIENCY ANEMIA TYPE: Primary | ICD-10-CM

## 2022-09-16 DIAGNOSIS — D50.9 IRON DEFICIENCY ANEMIA, UNSPECIFIED IRON DEFICIENCY ANEMIA TYPE: ICD-10-CM

## 2022-09-16 DIAGNOSIS — O09.92 SUPERVISION OF HIGH RISK PREGNANCY IN SECOND TRIMESTER: ICD-10-CM

## 2022-09-16 LAB
ALBUMIN SERPL-MCNC: 3 G/DL (ref 3.5–5)
ALBUMIN/GLOB SERPL: 0.7 {RATIO} (ref 1.2–3.5)
ALP SERPL-CCNC: 72 U/L (ref 50–136)
ALT SERPL-CCNC: 42 U/L (ref 12–65)
ANION GAP SERPL CALC-SCNC: 8 MMOL/L (ref 4–13)
AST SERPL-CCNC: 28 U/L (ref 15–37)
BASOPHILS # BLD: 0.1 K/UL (ref 0–0.2)
BASOPHILS NFR BLD: 1 % (ref 0–2)
BILIRUB SERPL-MCNC: 0.2 MG/DL (ref 0.2–1.1)
BUN SERPL-MCNC: 6 MG/DL (ref 6–23)
CALCIUM SERPL-MCNC: 8.8 MG/DL (ref 8.3–10.4)
CHLORIDE SERPL-SCNC: 106 MMOL/L (ref 101–110)
CO2 SERPL-SCNC: 22 MMOL/L (ref 21–32)
CREAT SERPL-MCNC: 0.6 MG/DL (ref 0.6–1)
DIFFERENTIAL METHOD BLD: NORMAL
EOSINOPHIL # BLD: 0.1 K/UL (ref 0–0.8)
EOSINOPHIL NFR BLD: 2 % (ref 0.5–7.8)
FERRITIN SERPL-MCNC: 223 NG/ML (ref 8–388)
GLOBULIN SER CALC-MCNC: 4.3 G/DL (ref 2.3–3.5)
GLUCOSE SERPL-MCNC: 88 MG/DL (ref 65–100)
HAPTOGLOB SERPL-MCNC: 62 MG/DL (ref 30–200)
HCT VFR BLD AUTO: 36.4 % (ref 35.8–46.3)
HGB BLD-MCNC: 11.8 G/DL (ref 11.7–15.4)
IMM GRANULOCYTES # BLD AUTO: 0.1 K/UL (ref 0–0.5)
IMM GRANULOCYTES NFR BLD AUTO: 2 % (ref 0–5)
IRON SATN MFR SERPL: 22 %
IRON SERPL-MCNC: 82 UG/DL (ref 35–150)
LDH SERPL L TO P-CCNC: 154 U/L (ref 100–190)
LYMPHOCYTES # BLD: 0.9 K/UL (ref 0.5–4.6)
LYMPHOCYTES NFR BLD: 18 % (ref 13–44)
MCH RBC QN AUTO: 28.9 PG (ref 26.1–32.9)
MCHC RBC AUTO-ENTMCNC: 32.4 G/DL (ref 31.4–35)
MCV RBC AUTO: 89 FL (ref 79.6–97.8)
MONOCYTES # BLD: 0.6 K/UL (ref 0.1–1.3)
MONOCYTES NFR BLD: 11 % (ref 4–12)
NEUTS SEG # BLD: 3.4 K/UL (ref 1.7–8.2)
NEUTS SEG NFR BLD: 66 % (ref 43–78)
NRBC # BLD: 0 K/UL (ref 0–0.2)
PLATELET # BLD AUTO: 197 K/UL (ref 150–450)
PLATELET COMMENT: ADEQUATE
PMV BLD AUTO: 9.8 FL (ref 9.4–12.3)
POTASSIUM SERPL-SCNC: 3.6 MMOL/L (ref 3.5–5.1)
PROT SERPL-MCNC: 7.3 G/DL (ref 6.3–8.2)
RBC # BLD AUTO: 4.09 M/UL (ref 4.05–5.2)
RBC MORPH BLD: NORMAL
RBC MORPH BLD: NORMAL
SODIUM SERPL-SCNC: 136 MMOL/L (ref 136–145)
TIBC SERPL-MCNC: 373 UG/DL (ref 250–450)
WBC # BLD AUTO: 5.2 K/UL (ref 4.3–11.1)
WBC MORPH BLD: NORMAL

## 2022-09-16 PROCEDURE — 83615 LACTATE (LD) (LDH) ENZYME: CPT

## 2022-09-16 PROCEDURE — 80053 COMPREHEN METABOLIC PANEL: CPT

## 2022-09-16 PROCEDURE — 99214 OFFICE O/P EST MOD 30 MIN: CPT | Performed by: INTERNAL MEDICINE

## 2022-09-16 PROCEDURE — 86235 NUCLEAR ANTIGEN ANTIBODY: CPT

## 2022-09-16 PROCEDURE — 36415 COLL VENOUS BLD VENIPUNCTURE: CPT

## 2022-09-16 PROCEDURE — 85025 COMPLETE CBC W/AUTO DIFF WBC: CPT

## 2022-09-16 PROCEDURE — 86038 ANTINUCLEAR ANTIBODIES: CPT

## 2022-09-16 PROCEDURE — 82728 ASSAY OF FERRITIN: CPT

## 2022-09-16 PROCEDURE — 83540 ASSAY OF IRON: CPT

## 2022-09-16 PROCEDURE — 83010 ASSAY OF HAPTOGLOBIN QUANT: CPT

## 2022-09-16 ASSESSMENT — PATIENT HEALTH QUESTIONNAIRE - PHQ9
SUM OF ALL RESPONSES TO PHQ QUESTIONS 1-9: 0
SUM OF ALL RESPONSES TO PHQ QUESTIONS 1-9: 0
2. FEELING DOWN, DEPRESSED OR HOPELESS: 0
SUM OF ALL RESPONSES TO PHQ9 QUESTIONS 1 & 2: 0
1. LITTLE INTEREST OR PLEASURE IN DOING THINGS: 0
SUM OF ALL RESPONSES TO PHQ QUESTIONS 1-9: 0
SUM OF ALL RESPONSES TO PHQ QUESTIONS 1-9: 0

## 2022-09-16 NOTE — PATIENT INSTRUCTIONS
Patient Instructions from Today's Visit    Reason for Visit:  Follow up visit for PATY in pregnancy  S/p Injectafer x 2 in August      Plan:        Follow Up:  -1 month labs only  - 2 months labs/Yusuf    Recent Lab Results:  Hospital Outpatient Visit on 09/16/2022   Component Date Value Ref Range Status    Haptoglobin 09/16/2022 62  30 - 200 mg/dL Final    LD 09/16/2022 154  100 - 190 U/L Final    WBC 09/16/2022 5.2  4.3 - 11.1 K/uL Final    PERIPHERAL REVIEW TO FOLLOW    RBC 09/16/2022 4.09  4.05 - 5.2 M/uL Final    Hemoglobin 09/16/2022 11.8  11.7 - 15.4 g/dL Final    Hematocrit 09/16/2022 36.4  35.8 - 46.3 % Final    MCV 09/16/2022 89.0  79.6 - 97.8 FL Final    MCH 09/16/2022 28.9  26.1 - 32.9 PG Final    MCHC 09/16/2022 32.4  31.4 - 35.0 g/dL Final    Platelets 83/06/9244 197  150 - 450 K/uL Final    MPV 09/16/2022 9.8  9.4 - 12.3 FL Final    nRBC 09/16/2022 0.00  0.0 - 0.2 K/uL Final    **Note: Absolute NRBC parameter is now reported with Hemogram**    Seg Neutrophils 09/16/2022 66  43 - 78 % Final    Lymphocytes 09/16/2022 18  13 - 44 % Final    Monocytes 09/16/2022 11  4.0 - 12.0 % Final    Eosinophils % 09/16/2022 2  0.5 - 7.8 % Final    Basophils 09/16/2022 1  0.0 - 2.0 % Final    Immature Granulocytes 09/16/2022 2  0.0 - 5.0 % Final    Segs Absolute 09/16/2022 3.4  1.7 - 8.2 K/UL Final    Absolute Lymph # 09/16/2022 0.9  0.5 - 4.6 K/UL Final    Absolute Mono # 09/16/2022 0.6  0.1 - 1.3 K/UL Final    Absolute Eos # 09/16/2022 0.1  0.0 - 0.8 K/UL Final    Basophils Absolute 09/16/2022 0.1  0.0 - 0.2 K/UL Final    Absolute Immature Granulocyte 09/16/2022 0.1  0.0 - 0.5 K/UL Final    RBC Comment 09/16/2022     Final                    Value:MODERATE  ANISOCYTOSIS      RBC Comment 09/16/2022     Final                    Value:Dimorphic RBCs  OCCASIONAL  TEARDROP CELLS      WBC Comment 09/16/2022 Result Confirmed By Smear    Final    Comment: SLIGHT  TOXIC GRANULATION      Platelet Comment 20/06/3964 ADEQUATE    Final    Differential Type 09/16/2022 AUTOMATED    Final    Sodium 09/16/2022 136  136 - 145 mmol/L Final    Potassium 09/16/2022 3.6  3.5 - 5.1 mmol/L Final    Chloride 09/16/2022 106  101 - 110 mmol/L Final    CO2 09/16/2022 22  21 - 32 mmol/L Final    Anion Gap 09/16/2022 8  4 - 13 mmol/L Final    Glucose 09/16/2022 88  65 - 100 mg/dL Final    BUN 09/16/2022 6  6 - 23 MG/DL Final    Creatinine 09/16/2022 0.60  0.6 - 1.0 MG/DL Final    GFR  09/16/2022 >60  >60 ml/min/1.73m2 Final    GFR Non- 09/16/2022 >60  >60 ml/min/1.73m2 Final    Comment:      Estimated GFR is calculated using the Modification of Diet in Renal Disease (MDRD) Study equation, reported for both  Americans (GFRAA) and non- Americans (GFRNA), and normalized to 1.73m2 body surface area. The physician must decide which value applies to the patient. The MDRD study equation should only be used in individuals age 25 or older. It has not been validated for the following: pregnant women, patients with serious comorbid conditions,or on certain medications, or persons with extremes of body size, muscle mass, or nutritional status. Calcium 09/16/2022 8.8  8.3 - 10.4 MG/DL Final    Total Bilirubin 09/16/2022 0.2  0.2 - 1.1 MG/DL Final    ALT 09/16/2022 42  12 - 65 U/L Final    AST 09/16/2022 28  15 - 37 U/L Final    Alk Phosphatase 09/16/2022 72  50 - 136 U/L Final    Total Protein 09/16/2022 7.3  6.3 - 8.2 g/dL Final    Albumin 09/16/2022 3.0 (A) 3.5 - 5.0 g/dL Final    Globulin 09/16/2022 4.3 (A) 2.3 - 3.5 g/dL Final    Albumin/Globulin Ratio 09/16/2022 0.7 (A) 1.2 - 3.5   Final    Ferritin 09/16/2022 223  8 - 388 NG/ML Final    Iron 09/16/2022 82  35 - 150 ug/dL Final    Comment: Known Interfering Substances section:  \"Iron values may be falsely elevated in  serum samples from patients with  anticoagulants (e.g., hemodialysis patients). \"  Limitations of Procedure section:  \"Turbidity resulting from precipitation of  fibrinogen in the serum of patients treated  with anticoagulants (e.g. hemodialysis  patients) may cause spuriously elevated  iron results. \"      TIBC 09/16/2022 373  250 - 450 ug/dL Final    TRANSFERRIN SATURATION 09/16/2022 22  >20 % Final        Treatment Summary has been discussed and given to patient: n/a        -------------------------------------------------------------------------------------------------------------------  Please call our office at [phone] if you have any  of the following symptoms:   Fever of 100.5 or greater  Chills  Shortness of breath  Swelling or pain in one leg    After office hours an answering service is available and will contact a provider for emergencies or if you are experiencing any of the above symptoms. Patient did express an interest in My Chart. My Chart log in information explained on the after visit summary printout at the [address] desk.     Deronda Kawasaki, RN

## 2022-09-17 LAB
ANA SER QL: POSITIVE
CENTROMERE B AB SER-ACNC: <0.2 AI (ref 0–0.9)
CHROMATIN AB SERPL-ACNC: <0.2 AI (ref 0–0.9)
DSDNA AB SER-ACNC: 1 IU/ML (ref 0–9)
ENA JO1 AB SER-ACNC: <0.2 AI (ref 0–0.9)
ENA RNP AB SER-ACNC: 2.4 AI (ref 0–0.9)
ENA SCL70 AB SER-ACNC: <0.2 AI (ref 0–0.9)
ENA SM AB SER-ACNC: <0.2 AI (ref 0–0.9)
ENA SS-A AB SER-ACNC: 0.5 AI (ref 0–0.9)
ENA SS-B AB SER-ACNC: 4.9 AI (ref 0–0.9)
Lab: ABNORMAL

## 2022-09-28 DIAGNOSIS — M32.8 OTHER FORMS OF SYSTEMIC LUPUS ERYTHEMATOSUS, UNSPECIFIED ORGAN INVOLVEMENT STATUS (HCC): Primary | ICD-10-CM

## 2022-09-28 NOTE — PROGRESS NOTES
CRISTY. [id number].  29w4d   Denies LOF, VB, Ctxs. Good FM. Vitals:    10/10/22 1029   BP: 110/72        Other forms of systemic lupus erythematosus ([address]  La Ab is increased at 4.9. MFM aware. Will recheck fetal HR to evaluate for heart block at [place] with MFM and advise on further need for  testing   Referral pending to rheumatology     HIV disease affecting pregnancy in second trimester  Well controlled with undetectable VL   Seeing MFM    History of  section complicating pregnancy  Plan for repeat     Anemia affecting pregnancy in second trimester  22 s/p IV iron with Hg 11.6    High risk pregnancy, multigravida of advanced maternal age in second trimester  Rh positive  Kick counts and labor precautions reviewed     Discussed TDAP and flu vaccine with patient  - considering     Will await MFM for final delivery recs. Latest would be repeat CS at 39 weeks. Patient aware.        Kisha Crawford,

## 2022-09-28 NOTE — ASSESSMENT & PLAN NOTE
La Ab is increased at 4.9. MFM aware.  Will recheck fetal HR at [place] and advise on further need for  testing

## 2022-09-28 NOTE — ASSESSMENT & PLAN NOTE
GTT today   Rh positive  Kick counts and labor precautions reviewed     Discussed TDAP and flu vaccine with patient

## 2022-09-28 NOTE — PROGRESS NOTES
Called pt to let her know labs reviewed by Dr. Stvee Villatoro. Referral to rheumatology placed for positive KANIKA.

## 2022-10-06 ENCOUNTER — TELEMEDICINE (OUTPATIENT)
Dept: RHEUMATOLOGY | Age: 47
End: 2022-10-06
Payer: OTHER GOVERNMENT

## 2022-10-06 DIAGNOSIS — R89.4 SEROLOGIC ABNORMALITY: ICD-10-CM

## 2022-10-06 DIAGNOSIS — E55.9 HYPOVITAMINOSIS D: ICD-10-CM

## 2022-10-06 DIAGNOSIS — D50.9 IRON DEFICIENCY ANEMIA, UNSPECIFIED IRON DEFICIENCY ANEMIA TYPE: ICD-10-CM

## 2022-10-06 DIAGNOSIS — O98.712 HIV DISEASE AFFECTING PREGNANCY IN SECOND TRIMESTER: ICD-10-CM

## 2022-10-06 DIAGNOSIS — M32.8 OTHER FORMS OF SYSTEMIC LUPUS ERYTHEMATOSUS, UNSPECIFIED ORGAN INVOLVEMENT STATUS (HCC): Primary | ICD-10-CM

## 2022-10-06 PROCEDURE — 99244 OFF/OP CNSLTJ NEW/EST MOD 40: CPT | Performed by: INTERNAL MEDICINE

## 2022-10-06 RX ORDER — FERROUS SULFATE 325(65) MG
325 TABLET ORAL
COMMUNITY
End: 2022-11-08

## 2022-10-06 NOTE — PATIENT INSTRUCTIONS
Labs - ruby, lupus panel, ccp, hep panel, APLS, anti mitochondrial ab, anti smooth muslce ab  Reading material on SLE   RTC in 3-4 weeks - call if any issues

## 2022-10-06 NOTE — PROGRESS NOTES
smoker, alcohol twice monthly prior to preg    Otherwise patient works full-time in [employer] from home. ROS:     Musculoskeletal ROS:   .    Abnormal: back pain, neck pain  . AM stiffness (hours): None  . Pain scale (0-10): 0  . Fatigue scale (0-10): 0  .    Job status: working   . Activities of daily living: No difficulty,    positive as above with the addition of   Otherwise negative for:  Fevers, chills or sweats. Weight  stable  No headaches or scalp tenderness. No jaw claudication. No acute visual changes. No red or dry eyes. No auditory complaints. No nasal discharge or rhinorrhea or dry mouth. No oral lesions or ulcers. No sore throat. No tongue pain. No cough. No shortness of breath, dyspnea on exertion or wheezing. No hemoptysis. No chest pains or palpitations. No lightheadedness. No pedal edema. No GERD symptoms, abdominal pain,diarrhea or constipation. No increased urinary frequency, nocturia, dysuria or changes in urine color. No alopecia, skin rashes/lesions. No changes in skin tightness. No Raynaud's. Photosensitivity.         Current Outpatient Medications:     docusate sodium (COLACE) 100 MG capsule, Take 1 capsule by mouth 2 times daily, Disp: 60 capsule, Rfl: 0    polyethylene glycol (GLYCOLAX) 17 GM/SCOOP powder, Take 17 g by mouth daily, Disp: 1530 g, Rfl: 1    aspirin EC 81 MG EC tablet, Take 2 tablets by mouth in the morning., Disp: 60 tablet, Rfl: 10    Prenatal Vit-Fe Fumarate-FA (PRENATAL VITAMINS PO), prenatal vit 10-iron-folic-dha  1 tab daily, Disp: , Rfl:     abacavir-lamiVUDine (EPZICOM) 600-300 MG per tablet, Take 1 tablet by mouth daily, Disp: , Rfl:     darunavir (PREZISTA) 800 MG TABS tablet, Take by mouth, Disp: , Rfl:     ritonavir (NORVIR) 100 MG tablet, Take 100 mg by mouth daily , Disp: , Rfl:     No Known Allergies    Patient Active Problem List   Diagnosis    HIV disease affecting pregnancy in second trimester    High risk pregnancy, multigravida of advanced maternal age in second trimester    History of  section complicating pregnancy    History of myomectomy    Anemia affecting pregnancy in second trimester    History of thyroid disease    Pregnancy resulting from in vitro fertilization in second trimester    Anemia, iron deficiency    Cortical cataract of both eyes    Uterine fibroid in pregnancy    Heart block AV first degree    Heart murmur    Hyperopia of both eyes    COVID-19 affecting pregnancy in first trimester    Other forms of systemic lupus erythematosus ([facility]       Past Medical History:   Diagnosis Date    Abnormal Pap smear of cervix     Acute left-sided low back pain without sciatica 2021    Onset  at least 2 weeks ago. Reports intermittent pain that can last for at least 3 hours. Denies any trauma or injury. No leg weakness or swelling. She tried taking Motrin to help with the pain. No abdominal pain or nausea. Noticed the pain occurred after being intimate  with her spouse. Will order a urinalysis and follow up pending results. Prescribe a trial of Flexeril 10 mg at bedtime and Volt    Anemia     prescribed iron to take daily     Anemia, iron deficiency 2014    Formatting of this note might be different from the original.  - noted to be Fe deficient w/ anemia, reporting menorrhagia 2014 fe sat = 6% hgb = 8.9 2014 h/h = 13/41- resolved  History: has had difficulty with anemia 2nd to heavy menses. Is not taking Fe at this time. [] chronic, untreated -- will discuss need for Fe at next visit -- check Fe panel at next visit    Autoimmune disorder ([facility]     HIV disease ([facility] [address]     Hyperthyroidism     No meds for several years    Infection due to yeast 2019    Complains of a vaginal discharge and feeling like there is a clump of something in her vagina. She does have some itching. She relates she has a discharge frequently after she has intercourse with her  whether he uses a condom or not.   We treated her for BV in February of this year and her symptoms resolved at that time. On exam she does have a thick white discharge in the vault and wet     Systemic lupus erythematosus ([address]        Past Surgical History:   Procedure Laterality Date     SECTION      x 2     LAP,TUBAL CAUTERY      MYOMECTOMY      laparoscopic    OTHER SURGICAL HISTORY      tubal reversal     WISDOM TOOTH EXTRACTION         Family History   Problem Relation Age of Onset    Prostate Cancer Paternal Grandfather     Diabetes Mother     No Known Problems Father     Alzheimer's Disease Maternal Grandmother     No Known Problems Maternal Grandfather     No Known Problems Paternal Grandmother     Breast Cancer Neg Hx     Colon Cancer Neg Hx     Hypertension Neg Hx        Social History     Socioeconomic History    Marital status:    Tobacco Use    Smoking status: Never    Smokeless tobacco: Never   Vaping Use    Vaping Use: Never used   Substance and Sexual Activity    Alcohol use: Not Currently    Drug use: Never    Sexual activity: Not Currently     Partners: Male   Social History Narrative    GYN: 2019  Abnormal Pap Smears: yes  Cervical Procedures: no  STDs: yes  chlamydia  and HIV (through 1st son's biological father)           Objective:          PHYSICAL EXAMINATION:     There were no vitals taken for this visit.         General: alert, cooperative, no distress, Appears well-developed and well-nourished   HENT: Normocephalic, atraumatic   Mental  status: mental status: alert, oriented to person, place, and time, normal mood, behavior, speech, dress, motor activity, and thought processes   Resp: resp: normal effort and no respiratory distress   Neuro: neuro: no gross deficits - No Facial Asymmetry (Cranial nerve 7 motor function) (limited exam due to video visit)    Skin: skin: no discoloration or lesions of concern on visible areas   MSK; Normal gait with no signs of ataxia, Normal range of motion of neck     Due to this being a TeleHealth evaluation, many elements of the physical examination are unable to be assessed. Assessment:         Mrs. Daivd Klinefelter is a very pleasant 82-year-old -American lady with past medical history significant for iron deficiency anemia, high risk pregnancy, HIV status, questionable lupus who presents for evaluation of abnormal serological markers. Extensive review of records from PCP, laboratory data, OB and hematology summarized as - patient currently pregnant with her third child via IVF and donor egg and follows high risk OB. Patient seen by hematology for iron deficiency anemia. Patient also followed by [facility] per which she takes Epzicom, Prezista, and Norvir daily for her HIV status. Patient was told in her past she has lupus markers therefore these were checked with her current pregnancy mildly abnormal positive RUBY, mildly elevated anti-RNP, mildly elevated SSB. Clinical picture shows no overt signs or symptoms of lupus or other autoimmune inflammatory process. Nevertheless patient does have mildly elevated anti-RNP and anti-SSB, which by themselves are clinically insignificant however given pregnancy prudent to check further serologies. Patient was reassured that generally the risk of  heart block is most associated with anti-SSA. Treatment plan was discussed in detail with patient. Agreement and understanding of the plan was verbalized by the patient. Total visit time   65 minutes, greater than half of the time was spent on counseling. Plan:     Labs - ruby, lupus panel, ccp, hep panel, APLS, anti mitochondrial ab, anti smooth muslce ab  Reading material on SLE   RTC in 3-4 weeks - call if any issues            CPT Codes 52096-58657 for Established Patients may apply to this Telehealth Visit  Total visit time 65 minutes , greater than half of the time was spent on counseling.          We discussed the expected course, resolution and complications of the diagnosis(es) in detail. Medication risks, benefits, costs, interactions, and alternatives were discussed as indicated. I advised her to contact the office if her condition worsens, changes or fails to improve as anticipated. She expressed understanding with the diagnosis(es) and plan. Pursuant to the emergency declaration under the [address] [place] [address] waiver authority and the [employer] Act, this Virtual  Visit was conducted, with patient's consent, to reduce the patient's risk of exposure to COVID-19 and provide continuity of care for an established patient. Services were provided through a video synchronous discussion virtually to substitute for in-person clinic visit.     Osiel Jaramillo MD

## 2022-10-10 ENCOUNTER — ROUTINE PRENATAL (OUTPATIENT)
Dept: OBGYN CLINIC | Age: 47
End: 2022-10-10

## 2022-10-10 VITALS — WEIGHT: 177 LBS | SYSTOLIC BLOOD PRESSURE: 110 MMHG | BODY MASS INDEX: 27.72 KG/M2 | DIASTOLIC BLOOD PRESSURE: 72 MMHG

## 2022-10-10 DIAGNOSIS — O34.219 HISTORY OF CESAREAN SECTION COMPLICATING PREGNANCY: ICD-10-CM

## 2022-10-10 DIAGNOSIS — U07.1 COVID-19 AFFECTING PREGNANCY IN FIRST TRIMESTER: ICD-10-CM

## 2022-10-10 DIAGNOSIS — O98.511 COVID-19 AFFECTING PREGNANCY IN FIRST TRIMESTER: ICD-10-CM

## 2022-10-10 DIAGNOSIS — O99.012 ANEMIA AFFECTING PREGNANCY IN SECOND TRIMESTER: ICD-10-CM

## 2022-10-10 DIAGNOSIS — O98.712 HIV DISEASE AFFECTING PREGNANCY IN SECOND TRIMESTER: ICD-10-CM

## 2022-10-10 DIAGNOSIS — M32.8 OTHER FORMS OF SYSTEMIC LUPUS ERYTHEMATOSUS, UNSPECIFIED ORGAN INVOLVEMENT STATUS (HCC): ICD-10-CM

## 2022-10-10 DIAGNOSIS — O09.812 PREGNANCY RESULTING FROM IN VITRO FERTILIZATION IN SECOND TRIMESTER: ICD-10-CM

## 2022-10-10 DIAGNOSIS — Z13.0 SCREENING FOR IRON DEFICIENCY ANEMIA: Primary | ICD-10-CM

## 2022-10-10 DIAGNOSIS — O09.522 HIGH RISK PREGNANCY, MULTIGRAVIDA OF ADVANCED MATERNAL AGE IN SECOND TRIMESTER: ICD-10-CM

## 2022-10-10 DIAGNOSIS — Z13.1 SCREENING FOR DIABETES MELLITUS (DM): ICD-10-CM

## 2022-10-10 DIAGNOSIS — Z98.890 HISTORY OF MYOMECTOMY: ICD-10-CM

## 2022-10-10 DIAGNOSIS — Z86.39 HISTORY OF THYROID DISEASE: ICD-10-CM

## 2022-10-10 PROCEDURE — 99902 PR PRENATAL VISIT: CPT | Performed by: OBSTETRICS & GYNECOLOGY

## 2022-10-17 ENCOUNTER — HOSPITAL ENCOUNTER (OUTPATIENT)
Dept: LAB | Age: 47
Discharge: HOME | End: 2022-10-20
Payer: OTHER GOVERNMENT

## 2022-10-17 DIAGNOSIS — D50.9 IRON DEFICIENCY ANEMIA, UNSPECIFIED IRON DEFICIENCY ANEMIA TYPE: ICD-10-CM

## 2022-10-17 LAB
ALBUMIN SERPL-MCNC: 3 G/DL (ref 3.5–5)
ALBUMIN/GLOB SERPL: 0.7 {RATIO} (ref 0.4–1.6)
ALP SERPL-CCNC: 92 U/L (ref 50–136)
ALT SERPL-CCNC: 29 U/L (ref 12–65)
ANION GAP SERPL CALC-SCNC: 8 MMOL/L (ref 2–11)
AST SERPL-CCNC: 22 U/L (ref 15–37)
BASOPHILS # BLD: 0.1 K/UL (ref 0–0.2)
BASOPHILS NFR BLD: 1 % (ref 0–2)
BILIRUB SERPL-MCNC: 0.4 MG/DL (ref 0.2–1.1)
BUN SERPL-MCNC: 7 MG/DL (ref 6–23)
CALCIUM SERPL-MCNC: 9.3 MG/DL (ref 8.3–10.4)
CHLORIDE SERPL-SCNC: 107 MMOL/L (ref 101–110)
CO2 SERPL-SCNC: 21 MMOL/L (ref 21–32)
CREAT SERPL-MCNC: 0.8 MG/DL (ref 0.6–1)
DIFFERENTIAL METHOD BLD: ABNORMAL
EOSINOPHIL # BLD: 0.1 K/UL (ref 0–0.8)
EOSINOPHIL NFR BLD: 2 % (ref 0.5–7.8)
ERYTHROCYTE [DISTWIDTH] IN BLOOD BY AUTOMATED COUNT: 20.4 % (ref 11.9–14.6)
FERRITIN SERPL-MCNC: 44 NG/ML (ref 8–388)
GLOBULIN SER CALC-MCNC: 4.3 G/DL (ref 2.8–4.5)
GLUCOSE SERPL-MCNC: 103 MG/DL (ref 65–100)
HAV IGM SER QL: NONREACTIVE
HBV CORE IGM SER QL: NONREACTIVE
HBV SURFACE AG SER QL: NONREACTIVE
HCT VFR BLD AUTO: 36.8 % (ref 35.8–46.3)
HCV AB SER QL: NONREACTIVE
HGB BLD-MCNC: 12.3 G/DL (ref 11.7–15.4)
IMM GRANULOCYTES # BLD AUTO: 0.2 K/UL (ref 0–0.5)
IMM GRANULOCYTES NFR BLD AUTO: 3 % (ref 0–5)
IRON SATN MFR SERPL: 25 %
IRON SERPL-MCNC: 99 UG/DL (ref 35–150)
LYMPHOCYTES # BLD: 1 K/UL (ref 0.5–4.6)
LYMPHOCYTES NFR BLD: 16 % (ref 13–44)
MCH RBC QN AUTO: 30.1 PG (ref 26.1–32.9)
MCHC RBC AUTO-ENTMCNC: 33.4 G/DL (ref 31.4–35)
MCV RBC AUTO: 90.2 FL (ref 82–102)
MONOCYTES # BLD: 0.5 K/UL (ref 0.1–1.3)
MONOCYTES NFR BLD: 8 % (ref 4–12)
NEUTS SEG # BLD: 4.3 K/UL (ref 1.7–8.2)
NEUTS SEG NFR BLD: 70 % (ref 43–78)
NRBC # BLD: 0 K/UL (ref 0–0.2)
PLATELET # BLD AUTO: 189 K/UL (ref 150–450)
PMV BLD AUTO: 10 FL (ref 9.4–12.3)
POTASSIUM SERPL-SCNC: 3.6 MMOL/L (ref 3.5–5.1)
PROT SERPL-MCNC: 7.3 G/DL (ref 6.3–8.2)
RBC # BLD AUTO: 4.08 M/UL (ref 4.05–5.2)
SODIUM SERPL-SCNC: 136 MMOL/L (ref 133–143)
TIBC SERPL-MCNC: 398 UG/DL (ref 250–450)
WBC # BLD AUTO: 6.2 K/UL (ref 4.3–11.1)

## 2022-10-17 PROCEDURE — 83540 ASSAY OF IRON: CPT

## 2022-10-17 PROCEDURE — 82728 ASSAY OF FERRITIN: CPT

## 2022-10-17 PROCEDURE — 85025 COMPLETE CBC W/AUTO DIFF WBC: CPT

## 2022-10-17 PROCEDURE — 80053 COMPREHEN METABOLIC PANEL: CPT

## 2022-10-17 PROCEDURE — 36415 COLL VENOUS BLD VENIPUNCTURE: CPT

## 2022-10-18 LAB
APTT SCREEN TO CONFIRM RATIO: 1.11 RATIO (ref 0–1.34)
CENTROMERE B AB SER-ACNC: <0.2 AI (ref 0–0.9)
CHROMATIN AB SERPL-ACNC: <0.2 AI (ref 0–0.9)
CONFIRM APTT/NORMAL: 32.3 SEC (ref 0–47.6)
DSDNA AB SER-ACNC: 1 IU/ML (ref 0–9)
ENA JO1 AB SER-ACNC: <0.2 AI (ref 0–0.9)
ENA RNP AB SER-ACNC: 2.4 AI (ref 0–0.9)
ENA SCL70 AB SER-ACNC: <0.2 AI (ref 0–0.9)
ENA SM AB SER-ACNC: <0.2 AI (ref 0–0.9)
ENA SS-A AB SER-ACNC: 0.5 AI (ref 0–0.9)
ENA SS-B AB SER-ACNC: 6.4 AI (ref 0–0.9)
LA 2 SCREEN W REFLEX-IMP: NORMAL
Lab: ABNORMAL
MITOCHONDRIA M2 IGG SER-ACNC: <20 UNITS (ref 0–20)
RHEUMATOID FACT SER QL LA: NEGATIVE
SCREEN APTT: 33.5 SEC (ref 0–51.9)
SCREEN DRVVT: 30.4 SEC (ref 0–47)
SMA IGG SER-ACNC: 9 UNITS (ref 0–19)
THROMBIN TIME: 15.7 SEC (ref 0–23)

## 2022-10-19 LAB
B2 GLYCOPROT1 IGG SER-ACNC: <9 GPI IGG UNITS (ref 0–20)
B2 GLYCOPROT1 IGM SER-ACNC: <9 GPI IGM UNITS (ref 0–32)
CCP IGA+IGG SERPL IA-ACNC: 7 UNITS (ref 0–19)

## 2022-10-21 ENCOUNTER — ROUTINE PRENATAL (OUTPATIENT)
Dept: OBGYN CLINIC | Age: 47
End: 2022-10-21
Payer: OTHER GOVERNMENT

## 2022-10-21 VITALS — SYSTOLIC BLOOD PRESSURE: 128 MMHG | DIASTOLIC BLOOD PRESSURE: 72 MMHG

## 2022-10-21 DIAGNOSIS — O09.812 PREGNANCY RESULTING FROM IN VITRO FERTILIZATION IN SECOND TRIMESTER: ICD-10-CM

## 2022-10-21 DIAGNOSIS — Z86.39 HISTORY OF THYROID DISEASE: ICD-10-CM

## 2022-10-21 DIAGNOSIS — Z98.890 HISTORY OF MYOMECTOMY: ICD-10-CM

## 2022-10-21 DIAGNOSIS — O99.013 ANEMIA AFFECTING PREGNANCY IN THIRD TRIMESTER: ICD-10-CM

## 2022-10-21 DIAGNOSIS — I44.0 HEART BLOCK AV FIRST DEGREE: Primary | ICD-10-CM

## 2022-10-21 DIAGNOSIS — R01.1 HEART MURMUR: ICD-10-CM

## 2022-10-21 DIAGNOSIS — O09.813 PREGNANCY RESULTING FROM IN VITRO FERTILIZATION IN THIRD TRIMESTER: ICD-10-CM

## 2022-10-21 DIAGNOSIS — Z3A.31 31 WEEKS GESTATION OF PREGNANCY: ICD-10-CM

## 2022-10-21 DIAGNOSIS — U07.1 COVID-19 AFFECTING PREGNANCY IN FIRST TRIMESTER: ICD-10-CM

## 2022-10-21 DIAGNOSIS — O09.523 HIGH RISK PREGNANCY, MULTIGRAVIDA OF ADVANCED MATERNAL AGE IN THIRD TRIMESTER: ICD-10-CM

## 2022-10-21 DIAGNOSIS — O34.219 HISTORY OF CESAREAN SECTION COMPLICATING PREGNANCY: ICD-10-CM

## 2022-10-21 DIAGNOSIS — D50.9 IRON DEFICIENCY ANEMIA, UNSPECIFIED IRON DEFICIENCY ANEMIA TYPE: ICD-10-CM

## 2022-10-21 DIAGNOSIS — M32.8 OTHER FORMS OF SYSTEMIC LUPUS ERYTHEMATOSUS, UNSPECIFIED ORGAN INVOLVEMENT STATUS (HCC): ICD-10-CM

## 2022-10-21 DIAGNOSIS — O09.522 HIGH RISK PREGNANCY, MULTIGRAVIDA OF ADVANCED MATERNAL AGE IN SECOND TRIMESTER: ICD-10-CM

## 2022-10-21 DIAGNOSIS — O98.511 COVID-19 AFFECTING PREGNANCY IN FIRST TRIMESTER: ICD-10-CM

## 2022-10-21 DIAGNOSIS — O34.10 UTERINE FIBROID IN PREGNANCY: ICD-10-CM

## 2022-10-21 DIAGNOSIS — O99.012 ANEMIA AFFECTING PREGNANCY IN SECOND TRIMESTER: ICD-10-CM

## 2022-10-21 DIAGNOSIS — D25.9 UTERINE FIBROID IN PREGNANCY: ICD-10-CM

## 2022-10-21 DIAGNOSIS — O98.713 HIV DISEASE AFFECTING PREGNANCY IN THIRD TRIMESTER: ICD-10-CM

## 2022-10-21 DIAGNOSIS — O98.712 HIV DISEASE AFFECTING PREGNANCY IN SECOND TRIMESTER: ICD-10-CM

## 2022-10-21 PROCEDURE — 76819 FETAL BIOPHYS PROFIL W/O NST: CPT | Performed by: OBSTETRICS & GYNECOLOGY

## 2022-10-21 PROCEDURE — 99215 OFFICE O/P EST HI 40 MIN: CPT | Performed by: OBSTETRICS & GYNECOLOGY

## 2022-10-21 PROCEDURE — 76816 OB US FOLLOW-UP PER FETUS: CPT | Performed by: OBSTETRICS & GYNECOLOGY

## 2022-10-21 ASSESSMENT — PATIENT HEALTH QUESTIONNAIRE - PHQ9
SUM OF ALL RESPONSES TO PHQ QUESTIONS 1-9: 0
2. FEELING DOWN, DEPRESSED OR HOPELESS: 0
1. LITTLE INTEREST OR PLEASURE IN DOING THINGS: 0
SUM OF ALL RESPONSES TO PHQ QUESTIONS 1-9: 0
SUM OF ALL RESPONSES TO PHQ QUESTIONS 1-9: 0
SUM OF ALL RESPONSES TO PHQ9 QUESTIONS 1 & 2: 0
SUM OF ALL RESPONSES TO PHQ QUESTIONS 1-9: 0

## 2022-10-21 NOTE — PROGRESS NOTES
MFM Consultation    Jordan Rodriguez (: 1975) is a 41 y.o. [id number] at 27w3d with 2022, by Ultrasound. Presents for evaluation of the following chief complaint(s):  Pregnancy Ultrasound (Growth and BPP) and High Risk Pregnancy (AMA, HIV, Anemia, C/S X2, fibroids, +covid in pregnancy )     Patient is working full time with [employer]. Scheduled to see Primary OB [facility]) on 10/27/22. Denies HA or edema. Denies preeclamptic symptoms. Reports fetal movement. No bleeding, LOF, cramping, ctxs, or vaginal pressure. Interval history since prior appt reviewed and updated as indicated. Doing well. No significant concerns. Reviewed risks of preE with donor egg IVF, particularly with AMA status. HIV stable, will have next labs in 2-3 months. Interval history since prior appt reviewed and updated as indicated. Review of Systems - per HPI; otherwise unremarkable. Exam:     Vitals:    10/21/22 0955   BP: 128/72     Ultrasound: Please see formal ultrasound report under imaging tab. ASSESSMENT/PLAN:  Patient Active Problem List    Diagnosis Date Noted    High risk pregnancy, multigravida of advanced maternal age in third trimester 2022     Priority: High     Conceived via IVF with donor age Opal Meyers)  Plans for first screen with MFM  Asa 162 mg qhs   22 at [facility]: Normal NT 1.4 mm and nasal bone. IVF Donor Egg from 21year old, low risk of aneuploidy. Genetic counseling done by MD.  Declines Genetic Testing. Patient with multiple Medical Conditions that complicate this pregnancy. See each individual Diagnosis Overview for details. Talked with patient about each diagnosis and plan. Patient has excellent knowledge of her conditions. She agrees with POC. Summary of Current Plan  1) History of Myomectomy-Hysteroscopic removal. Multiple small fibroids noted. Increase vascularity see at lower segment. Will follow for PAS with each US.   2) HIV Positive, long standing condition- On HIIT Therapy, last Quant undetectable. Defer Management to ID at [facility]. Will let Neonatologists know prior to delivery. 3) Anemia-Severe Anemia, not tolerating po Fe, N/V. Needs IV Fe in 2nd trimester. 4) Covid-Recent infection, improving, no SOB. 5) Advanced AMA-Donor egg of 21year old, very low risk of Aneuploidy with Nl NT/NB. Very High Risk of PreE, taking 162 mgs ASA.  22 at [facility]: Normal anatomy; limited echo, AC 96 %, CHRIS WNL, Negative NIPT  22 at [facility]: Appropriate fetal growth; Normal repeat echo; AC 88%, Overall 72%, CHRIS 16 cm, Dopplers WNL  10/21/22 at [facility]: Appropriate fetal growth;AC 77%, CHRIS  cm, Dopplers WNL, BPP     F/U MFM weekly at 32 weeks (Fri-MFM); Will share twice weekly with OB  Repeat C/S at 37-39 weeks due to multiple morbidities and high risk conditions in this pregnancy      Other forms of systemic lupus erythematosus ([facility] [address] 2022     Priority: Medium     22 Hematology: La Ab is increased at 4.9. MFM aware. Will recheck fetal HR at [place] and advise on further need for  testing   10/21/2022 [facility]: no heart block noted and unlikely to develop at this gestational age. COVID-19 affecting pregnancy in first trimester 2022     Priority: Medium     22 at [facility]:  Testing positive for Covid19 on . Symptoms have been mild. Headaches, fevers now just feeling congested, no SOB. Vital signs 127/88, P82, Pulse Ox 100%. 22 at [facility]:  Denies symptoms  No further testing needed        Anemia affecting pregnancy in third trimester 2018     Priority: Medium     Severe anemia noted on IPV   Hg 9.0, microcytic   Iron panel/electrophoresis today and start iron BID. Referral to heme/onc for evaluation of IV iron      - noted to be Fe deficient w/ anemia, reporting menorrhagia  2014 fe sat = 6% hgb = 8.9  2014 h/h = - resolved    History: has had difficulty with anemia 2nd to heavy menses.  Is not taking Fe at this time. [] chronic, untreated  -- will discuss need for Fe at next visit  -- check Fe panel at next visit    06/16/22 at [facility]: HGB on 6/6 9.0. Was started on iron BID.    7/07 unable to tolerate PO iron. Vomits with each use.   08/11/22 at [facility]: Scheduled for injectafer injections with Hematology on 8/17/22 9/16/22 s/p IV iron with Hg 11.6                Pregnancy resulting from in vitro fertilization in third trimester 01/14/2016     Priority: Medium     Donor egg. Embryos were not genetically tested   Asa 162 qhs   Planning first screen with MFM  06/16/22 at [facility]: Denies genetic testing today. Patient reassured by ultrasound today   9/9/2022 [facility]: reassuring fetal growth and followup anatomy      HIV disease affecting pregnancy in third trimester 05/27/2014     Priority: Medium     Dx in 2003   Followed by  ID    2014-09 cd4= 622 (36%)  hiv rna =nd  ----------  2004-02 heterosexual exposure with subsequent pharygitis; followed by recurrent vaginal yeast infections abnormal pap  2004-09 Diagnosis 2nd to recurrent vaginal yeast infections. 2004-10 Laura Samoan, norvir; cd4= 224, hiv rna = [id number]  2005-03 epzicom, reyataz (unboosted) [hyperpigmentation 2nd to [address]  2005: tx w/ trizivir/ norvir reyataz when pregnant  2006: epzicom, reyataz (unboosted) >> hiv rna = undetected and cd4 = 300-400  2009-02: formal safe sex/HIV transmission partner counseling by provider  2011-09 paul Cross >> epzicom, rezista, norvir bc of 1st degree heart block  2012-08 hiv rna <20 cd4= 362  2014-05 [facility] 1st visit HX: Diagnosed in 2003 and was started on treatment very soon; Antiretroviral history = has been treated with truvada but this causes skin discoloration; norvir = gel cap = dificult to swallow; reyataz = but had more medications at the time and then was changed to simplify regimen.   Exposure  to infected male- father of her son; now  since 2006;  (hiv-) is tested regularly; HIV RNA = not detected    -- monitor labs for efficacy and toxicity of antiviral therapy  -- refill darunavir, norvir, truvada  -- rtn in 4-6m    22 at [facility]: Patient was diagnosed with HIV in . Managed with [employer] every 6 months. Next appointment is in July. Last appointment viral load per patient was undetectable. Taking Epzicom, Prezista, and Norvir daily. Would not change any treatment, watch for FGR with serial US.     2022 [facility]: labs reviewed. VL undetectable , reassuring CD4 count. Continues to do well with HAART. Reviewed delivery medication plans- remain on current dose;  will need AZT         Anemia, iron deficiency 2014     Priority: Medium      - noted to be Fe deficient w/ anemia, reporting menorrhagia  2014 fe sat = 6% hgb = 8.9  2014 h/h = - resolved    History: has had difficulty with anemia 2nd to heavy menses. Is not taking Fe at this time. [] chronic, untreated  -- will discuss need for Fe at next visit  -- check Fe panel at next visit      History of  section complicating pregnancy      Priority: Low     Hx of C/S x 2 at outside facility   1 Arrest  2 Elective repeat ()        History of myomectomy 2022     Priority: Low     [facility] myomectomy at outside facility in  in [place], [place]  Had term repeat C/S following surgery so assuming not in contractile portion         Cortical cataract of both eyes 2022     Priority: Low    Hyperopia of both eyes 2021     Priority: Low    Heart murmur 2018     Priority: Low     22 at [facility]:  Patient reports being diagnosed after leaving hospital in  after having her son.   Has had no issues and does not see a cardiologist         Uterine fibroid in pregnancy 2015     Priority: Low       22 at [facility]: several small fibroids seen on ultrasound today       Heart block AV first degree 2014     Priority: Low     2011 1st degree heart block on Reyataz, epzicom; improved off reyataz; ECHO = normal                History of thyroid disease 06/08/2022     Hx of autoimmune thyroid dz. She is unsure the cause of her hyperthyroidism. She was treated with PTU in the past.  This was diagnosed many years ago. She will get labs done at [facility]. Apparently euthyroid for the past several years. Has not been on meds for years. TSH normal on IPV labs     06/16/22 at [facility]: has not been on meds for years. Labs 6/6 Free T4 0.9, TSH 0.477       Normal BP  Maternal heart block, new SLE     HIV stable  Normotensive  Prior c/s x 2, myomectomy    Will need shared testing beginning 32wk. Mood reassuring    Plan delivery 37-39 by scheduled R c/s due to multiple concerns for placental dysfunction/maternal and fetal well-being. Addressed normal pregnancy complaints, reassured and offered suggestions for care  Reviewed gestational age precautions and activity goals/limitations  Nutritional counseling as well as specific goals based on current maternal and fetal status  Options for GERD therapy if becomes symptomatic over course of pregnancy  Gestational age appropriate preventive care regarding communicable disease transmission and vaccines as appropriate (including flu, TDaP, and COVID.)  Additional plans and concerns as documented in problem list.   All questions answered and concerns discussed. An electronic signature was used to authenticate this note. Janine Connor MD    I have spent 40 minutes reviewing previous notes, test results and face to face with the patient discussing the diagnosis and importance of compliance with the treatment plan, in addition to ultrasound findings, as well as documenting on the day of the visit (10/21/2022). Return in about 1 week (around 10/28/2022) for BPP.       Patient Active Problem List   Diagnosis Code    HIV disease affecting pregnancy in third trimester O98.713    High risk pregnancy, multigravida of advanced maternal age in third trimester O09.523    History of  section complicating pregnancy C10.431    History of myomectomy Z98.890    Anemia affecting pregnancy in third trimester O99.013    History of thyroid disease Z86.39    Pregnancy resulting from in vitro fertilization in third trimester O09.813    Anemia, iron deficiency D50.9    Cortical cataract of both eyes H26.9    Uterine fibroid in pregnancy O34.10, D25.9    Heart block AV first degree I44.0    Heart murmur R01.1    Hyperopia of both eyes H52.03    COVID-19 affecting pregnancy in first trimester O98.511, U07.1    Other forms of systemic lupus erythematosus ([address] M32.8     Visit Diagnoses         Codes    31 weeks gestation of pregnancy     Z3A.31

## 2022-10-27 ENCOUNTER — ROUTINE PRENATAL (OUTPATIENT)
Dept: OBGYN CLINIC | Age: 47
End: 2022-10-27
Payer: OTHER GOVERNMENT

## 2022-10-27 VITALS — SYSTOLIC BLOOD PRESSURE: 108 MMHG | DIASTOLIC BLOOD PRESSURE: 70 MMHG | WEIGHT: 179 LBS | BODY MASS INDEX: 28.04 KG/M2

## 2022-10-27 DIAGNOSIS — O98.713 HIV DISEASE AFFECTING PREGNANCY IN THIRD TRIMESTER: Primary | ICD-10-CM

## 2022-10-27 DIAGNOSIS — O99.013 ANEMIA AFFECTING PREGNANCY IN THIRD TRIMESTER: ICD-10-CM

## 2022-10-27 DIAGNOSIS — U07.1 COVID-19 AFFECTING PREGNANCY IN FIRST TRIMESTER: ICD-10-CM

## 2022-10-27 DIAGNOSIS — Z23 NEED FOR TDAP VACCINATION: ICD-10-CM

## 2022-10-27 DIAGNOSIS — O98.511 COVID-19 AFFECTING PREGNANCY IN FIRST TRIMESTER: ICD-10-CM

## 2022-10-27 DIAGNOSIS — O09.813 PREGNANCY RESULTING FROM IN VITRO FERTILIZATION IN THIRD TRIMESTER: ICD-10-CM

## 2022-10-27 DIAGNOSIS — Z86.39 HISTORY OF THYROID DISEASE: ICD-10-CM

## 2022-10-27 DIAGNOSIS — Z23 FLU VACCINE NEED: ICD-10-CM

## 2022-10-27 DIAGNOSIS — O34.219 HISTORY OF CESAREAN SECTION COMPLICATING PREGNANCY: ICD-10-CM

## 2022-10-27 DIAGNOSIS — Z98.890 HISTORY OF MYOMECTOMY: ICD-10-CM

## 2022-10-27 DIAGNOSIS — O09.523 HIGH RISK PREGNANCY, MULTIGRAVIDA OF ADVANCED MATERNAL AGE IN THIRD TRIMESTER: ICD-10-CM

## 2022-10-27 PROCEDURE — 90715 TDAP VACCINE 7 YRS/> IM: CPT | Performed by: OBSTETRICS & GYNECOLOGY

## 2022-10-27 PROCEDURE — 90472 IMMUNIZATION ADMIN EACH ADD: CPT | Performed by: OBSTETRICS & GYNECOLOGY

## 2022-10-27 PROCEDURE — 99902 PR PRENATAL VISIT: CPT | Performed by: OBSTETRICS & GYNECOLOGY

## 2022-10-27 PROCEDURE — 90674 CCIIV4 VAC NO PRSV 0.5 ML IM: CPT | Performed by: OBSTETRICS & GYNECOLOGY

## 2022-10-27 PROCEDURE — 90471 IMMUNIZATION ADMIN: CPT | Performed by: OBSTETRICS & GYNECOLOGY

## 2022-10-27 RX ORDER — CETIRIZINE HYDROCHLORIDE 10 MG/1
10 TABLET ORAL DAILY
Qty: 30 TABLET | Refills: 5 | Status: SHIPPED | OUTPATIENT
Start: 2022-10-27 | End: 2022-11-26

## 2022-10-27 NOTE — ASSESSMENT & PLAN NOTE
Start 2x weekly testing. BPPs with MFM  Delivery via repeat C/S between 37-39 weeks per MFM.  Discussed with patient and request placed for ____  HIV remains well controlled   Kick counts and labor precautions reviewed

## 2022-10-27 NOTE — PROGRESS NOTES
CRISTY. [id number].  32w0d   Denies LOF, VB, Ctxs. Good FM. Vitals:    10/27/22 1141   BP: 108/70        High risk pregnancy, multigravida of advanced maternal age in third trimester  Start 2x weekly testing. BPPs with MFM  Delivery via repeat C/S between 37-39 weeks per [facility]. Discussed with patient and request placed for 12/08 with Jacinta Kaye at 38 weeks   HIV remains well controlled - message sent to [facility] about AZT prior to CS. If this is needed will bring in the night before so she can get dose early and 3hrs prior.    Kick counts and labor precautions reviewed   Pre-eclampsia precautions reviewed     Message sent to get patient scheduled out for weekly NSTs    Reached out again today to anesthesia to get consult - general for last 2 due to failed spinal  Zyrtec daily for allergies    Kisha Crawford DO

## 2022-10-27 NOTE — PROGRESS NOTES
Flu vaccine given today, see immunization encounter for details. Tdap given in office today, pt tolerated well. See immunization encounter for details.

## 2022-10-28 ENCOUNTER — TELEPHONE (OUTPATIENT)
Dept: OBGYN CLINIC | Age: 47
End: 2022-10-28

## 2022-10-28 NOTE — TELEPHONE ENCOUNTER
[facility], Repeat  scheduled for 22 @ 7631 with  pt . Pt instructions sent via [employer]. IOL form faxed to L&D.

## 2022-11-01 ENCOUNTER — ROUTINE PRENATAL (OUTPATIENT)
Dept: OBGYN CLINIC | Age: 47
End: 2022-11-01
Payer: OTHER GOVERNMENT

## 2022-11-01 VITALS — WEIGHT: 180 LBS | SYSTOLIC BLOOD PRESSURE: 120 MMHG | DIASTOLIC BLOOD PRESSURE: 76 MMHG | BODY MASS INDEX: 28.19 KG/M2

## 2022-11-01 DIAGNOSIS — O28.8 NON-REACTIVE NST (NON-STRESS TEST): ICD-10-CM

## 2022-11-01 DIAGNOSIS — O99.013 ANEMIA AFFECTING PREGNANCY IN THIRD TRIMESTER: ICD-10-CM

## 2022-11-01 DIAGNOSIS — O09.813 PREGNANCY RESULTING FROM IN VITRO FERTILIZATION IN THIRD TRIMESTER: ICD-10-CM

## 2022-11-01 DIAGNOSIS — O34.219 HISTORY OF CESAREAN SECTION COMPLICATING PREGNANCY: Primary | ICD-10-CM

## 2022-11-01 DIAGNOSIS — O09.523 HIGH RISK PREGNANCY, MULTIGRAVIDA OF ADVANCED MATERNAL AGE IN THIRD TRIMESTER: ICD-10-CM

## 2022-11-01 DIAGNOSIS — O98.511 COVID-19 AFFECTING PREGNANCY IN FIRST TRIMESTER: ICD-10-CM

## 2022-11-01 DIAGNOSIS — U07.1 COVID-19 AFFECTING PREGNANCY IN FIRST TRIMESTER: ICD-10-CM

## 2022-11-01 DIAGNOSIS — Z98.890 HISTORY OF MYOMECTOMY: ICD-10-CM

## 2022-11-01 DIAGNOSIS — Z86.39 HISTORY OF THYROID DISEASE: ICD-10-CM

## 2022-11-01 DIAGNOSIS — O98.713 HIV DISEASE AFFECTING PREGNANCY IN THIRD TRIMESTER: ICD-10-CM

## 2022-11-01 PROCEDURE — 99902 PR PRENATAL VISIT: CPT | Performed by: OBSTETRICS & GYNECOLOGY

## 2022-11-01 PROCEDURE — 76818 FETAL BIOPHYS PROFILE W/NST: CPT | Performed by: OBSTETRICS & GYNECOLOGY

## 2022-11-01 RX ORDER — FAMOTIDINE 20 MG/1
20 TABLET, FILM COATED ORAL NIGHTLY PRN
Qty: 30 TABLET | Refills: 3 | Status: SHIPPED | OUTPATIENT
Start: 2022-11-01

## 2022-11-01 NOTE — PROGRESS NOTES
COLBY [id number].  32w5d   Denies LOF, VB, Ctxs. Good FM. Still having postnasal drip and thick mucus. Not coughing. Has been going on since COVID. No fevers. No SOB/CP. Zyrtec is helping some, but not much. Vitals:    11/01/22 0941   BP: 120/76      Cardio/pulm: RRR, lungs CTA b/l    High risk pregnancy, multigravida of advanced maternal age in third trimester  NST today due to multiple co morbidities  R CS scheduled at 38 weeks - does not need AZT prior if VL undetectable   HIV well controlled  Kick counts and labor precautions reviewed   Continue 2x weekly testing   S/p Flu and TDAP     Add pepcid and continue zytrec. If not improving recommend she see her PCP.      NST reassuring, but not reactive > BPP 8/8    Kisha Crawford,

## 2022-11-01 NOTE — ASSESSMENT & PLAN NOTE
NST today due to multiple co morbidities  R CS scheduled at 38 weeks - does not need AZT prior if VL undetectable   HIV well controlled  Kick counts and labor precautions reviewed   Continue 2x weekly testing   S/p Flu and TDAP

## 2022-11-04 ENCOUNTER — ROUTINE PRENATAL (OUTPATIENT)
Dept: OBGYN CLINIC | Age: 47
End: 2022-11-04
Payer: OTHER GOVERNMENT

## 2022-11-04 VITALS — DIASTOLIC BLOOD PRESSURE: 84 MMHG | HEART RATE: 88 BPM | SYSTOLIC BLOOD PRESSURE: 117 MMHG

## 2022-11-04 DIAGNOSIS — O98.713 HIV DISEASE AFFECTING PREGNANCY IN THIRD TRIMESTER: ICD-10-CM

## 2022-11-04 DIAGNOSIS — O34.219 HISTORY OF CESAREAN SECTION COMPLICATING PREGNANCY: ICD-10-CM

## 2022-11-04 DIAGNOSIS — Z3A.33 33 WEEKS GESTATION OF PREGNANCY: Primary | ICD-10-CM

## 2022-11-04 DIAGNOSIS — Z98.890 HISTORY OF MYOMECTOMY: ICD-10-CM

## 2022-11-04 DIAGNOSIS — O98.511 COVID-19 AFFECTING PREGNANCY IN FIRST TRIMESTER: ICD-10-CM

## 2022-11-04 DIAGNOSIS — U07.1 COVID-19 AFFECTING PREGNANCY IN FIRST TRIMESTER: ICD-10-CM

## 2022-11-04 DIAGNOSIS — Z13.32 ENCOUNTER FOR SCREENING FOR MATERNAL DEPRESSION: ICD-10-CM

## 2022-11-04 DIAGNOSIS — D25.9 UTERINE FIBROID IN PREGNANCY: ICD-10-CM

## 2022-11-04 DIAGNOSIS — D50.9 IRON DEFICIENCY ANEMIA, UNSPECIFIED IRON DEFICIENCY ANEMIA TYPE: ICD-10-CM

## 2022-11-04 DIAGNOSIS — O99.013 ANEMIA AFFECTING PREGNANCY IN THIRD TRIMESTER: ICD-10-CM

## 2022-11-04 DIAGNOSIS — M32.8 OTHER FORMS OF SYSTEMIC LUPUS ERYTHEMATOSUS, UNSPECIFIED ORGAN INVOLVEMENT STATUS (HCC): ICD-10-CM

## 2022-11-04 DIAGNOSIS — O09.813 PREGNANCY RESULTING FROM IN VITRO FERTILIZATION IN THIRD TRIMESTER: ICD-10-CM

## 2022-11-04 DIAGNOSIS — O34.10 UTERINE FIBROID IN PREGNANCY: ICD-10-CM

## 2022-11-04 DIAGNOSIS — O09.523 HIGH RISK PREGNANCY, MULTIGRAVIDA OF ADVANCED MATERNAL AGE IN THIRD TRIMESTER: ICD-10-CM

## 2022-11-04 PROCEDURE — 99214 OFFICE O/P EST MOD 30 MIN: CPT | Performed by: OBSTETRICS & GYNECOLOGY

## 2022-11-04 PROCEDURE — 76815 OB US LIMITED FETUS(S): CPT | Performed by: OBSTETRICS & GYNECOLOGY

## 2022-11-04 PROCEDURE — 96127 BRIEF EMOTIONAL/BEHAV ASSMT: CPT | Performed by: OBSTETRICS & GYNECOLOGY

## 2022-11-04 PROCEDURE — 76819 FETAL BIOPHYS PROFIL W/O NST: CPT | Performed by: OBSTETRICS & GYNECOLOGY

## 2022-11-04 ASSESSMENT — PATIENT HEALTH QUESTIONNAIRE - PHQ9
SUM OF ALL RESPONSES TO PHQ QUESTIONS 1-9: 0
SUM OF ALL RESPONSES TO PHQ9 QUESTIONS 1 & 2: 0
2. FEELING DOWN, DEPRESSED OR HOPELESS: 0
SUM OF ALL RESPONSES TO PHQ QUESTIONS 1-9: 0
SUM OF ALL RESPONSES TO PHQ QUESTIONS 1-9: 0
1. LITTLE INTEREST OR PLEASURE IN DOING THINGS: 0
SUM OF ALL RESPONSES TO PHQ QUESTIONS 1-9: 0

## 2022-11-04 NOTE — PROGRESS NOTES
MFM Consultation    Gaurav Dacosta (: 1975) is a 41 y.o. [id number] at 33w1d with 2022, by Ultrasound. Presents for evaluation of the following chief complaint(s):  High Risk Pregnancy (AMA, HIV, Anemia, C/S X2, fibroids, +covid in pregnancy, IVF)     Patient is working full time with [employer]. Scheduled to see Primary OB [facility]) on 22; Rheumatology on ; Hemoc on . Denies HA or edema. Denies preeclamptic symptoms. Reports fetal movement. No bleeding, LOF, cramping, ctxs, or vaginal pressure. Interval history since prior appt reviewed and updated as indicated. Doing well. No significant concerns. Reviewed risks of preE with donor egg IVF, particularly with AMA status. HIV stable, last labs available 22, no issues with meds. Interval history since prior appt reviewed and updated as indicated. Review of Systems - per HPI; otherwise unremarkable. Exam:     Vitals:    22 1126   BP: 117/84   Pulse: 88     Ultrasound: Please see formal ultrasound report under imaging tab. ASSESSMENT/PLAN:  Patient Active Problem List    Diagnosis Date Noted    High risk pregnancy, multigravida of advanced maternal age in third trimester 2022     Priority: High     Conceived via IVF with donor age Regino Wolf)  Plans for first screen with MFM  Asa 162 mg qhs   22 at [facility]: Normal NT 1.4 mm and nasal bone. IVF Donor Egg from 21year old, low risk of aneuploidy. Genetic counseling done by MD.  Declines Genetic Testing. Patient with multiple Medical Conditions that complicate this pregnancy. See each individual Diagnosis Overview for details. Talked with patient about each diagnosis and plan. Patient has excellent knowledge of her conditions. She agrees with POC. Summary of Current Plan  1) History of Myomectomy-Hysteroscopic removal. Multiple small fibroids noted. Increase vascularity see at lower segment. Will follow for PAS with each US.   2) HIV Positive, long standing condition- On HIIT Therapy, last Quant undetectable. Defer Management to ID at [facility]. Will let Neonatologists know prior to delivery. 3) Anemia-Severe Anemia, not tolerating po Fe, N/V. Needs IV Fe in 2nd trimester. 4) Covid-Recent infection, improving, no SOB. 5) Advanced AMA-Donor egg of 21year old, very low risk of Aneuploidy with Nl NT/NB. Very High Risk of PreE, taking 162 mgs ASA.  22 at [facility]: Normal anatomy; limited echo, AC 96 %, CHRIS WNL, Negative NIPT  22 at [facility]: Appropriate fetal growth; Normal repeat echo; AC 88%, Overall 72%, CHRIS 16 cm, Dopplers WNL  10/21/22 at [facility]: Appropriate fetal growth;AC 77%, CHRIS  cm, Dopplers WNL, BPP 8/8  22 at [facility]: Reassuring fetal status, BPP 8/8, CHRIS 16cm. F/U MFM weekly at 32 weeks (Fri-MF); Will share twice weekly with OB  Repeat C/S at 37-39 weeks due to multiple morbidities and high risk conditions in this pregnancy      Other forms of systemic lupus erythematosus ([facility] [address] 2022     Priority: Medium     22 Hematology: La Ab is increased at 4.9. MFM aware. Will recheck fetal HR at [place] and advise on further need for  testing   10/21/2022 [facility]: no heart block noted and unlikely to develop at this gestational age. COVID-19 affecting pregnancy in first trimester 2022     Priority: Medium     22 at [facility]:  Testing positive for Covid19 on . Symptoms have been mild. Headaches, fevers now just feeling congested, no SOB. Vital signs 127/88, P82, Pulse Ox 100%. 22 at [facility]:  Denies symptoms  No further testing needed        Anemia affecting pregnancy in third trimester 2018     Priority: Medium     Severe anemia noted on IPV   Hg 9.0, microcytic   Iron panel/electrophoresis today and start iron BID.  Referral to heme/onc for evaluation of IV iron      - noted to be Fe deficient w/ anemia, reporting menorrhagia  2014-02 fe sat = 6% hgb = 8.9  2014 h/h = - resolved    History: has had difficulty with anemia 2nd to heavy menses. Is not taking Fe at this time. [] chronic, untreated  -- will discuss need for Fe at next visit  -- check Fe panel at next visit    22 at [facility]: HGB on  9.0. Was started on iron BID.     unable to tolerate PO iron. Vomits with each use.   22 at [facility]: Scheduled for injectafer injections with Hematology on 22 s/p IV iron with Hg 11.6                Pregnancy resulting from in vitro fertilization in third trimester 2016     Priority: Medium     Donor egg. Embryos were not genetically tested   Asa 162 qhs   Planning first screen with MFM  22 at [facility]: Denies genetic testing today. Patient reassured by ultrasound today   2022 [facility]: reassuring fetal growth and followup anatomy      HIV disease affecting pregnancy in third trimester 2014     Priority: Medium       22 at [facility]: Patient was diagnosed with HIV in . Managed with [employer] every 6 months. Next appointment is in July. Last appointment viral load per patient was undetectable. Taking Epzicom, Prezista, and Norvir daily. Would not change any treatment, watch for FGR with serial US.     2022 [facility]: labs reviewed. VL undetectable , reassuring CD4 count. Continues to do well with HAART. Reviewed delivery medication plans- remain on current dose;  will need AZT  22 at [facility]: stable on medications. Needs VL at 35-36 weeks prior to c/s    Per MFM, if VL undetectable does not need AZT prior to CS         Anemia, iron deficiency 2014     Priority: Medium      - noted to be Fe deficient w/ anemia, reporting menorrhagia  2014 fe sat = 6% hgb = 8.9  2014 h/h = - resolved    History: has had difficulty with anemia 2nd to heavy menses. Is not taking Fe at this time.   [] chronic, untreated  -- will discuss need for Fe at next visit  -- check Fe panel at next visit    22 at [facility]: Hgb 12.3 on 10/17. Received iron infusion over the summer and has since stopped taking po iron supplement. History of  section complicating pregnancy      Priority: Low     Hx of C/S x 2 at outside facility   1 Arrest  2 Elective repeat ()        History of myomectomy 2022     Priority: Low     [facility] myomectomy at outside facility in  in [facility]  Had term repeat C/S following surgery so assuming not in contractile portion         Cortical cataract of both eyes 2022     Priority: Low    Hyperopia of both eyes 2021     Priority: Low    Heart murmur 2018     Priority: Low     22 at [facility]:  Patient reports being diagnosed after leaving hospital in  after having her son. Has had no issues and does not see a cardiologist         Uterine fibroid in pregnancy 2015     Priority: Low       22 at [facility]: several small fibroids seen on ultrasound today       Heart block AV first degree 2014     Priority: Low     - 1st degree heart block on Reyataz, epzicom; improved off reyataz; ECHO = normal                History of thyroid disease 2022     Hx of autoimmune thyroid dz. She is unsure the cause of her hyperthyroidism. She was treated with PTU in the past.  This was diagnosed many years ago. She will get labs done at [facility]. Apparently euthyroid for the past several years. Has not been on meds for years. TSH normal on IPV labs     22 at [facility]: has not been on meds for years. Labs  Free T4 0.9, TSH 0.477       HIV- stable, but rec viral load at 35-36 wk; if viral load undetectable, continue current triple therapy. CHTN- stable.      Addressed normal pregnancy complaints, reassured and offered suggestions for care  Reviewed gestational age precautions and activity goals/limitations  Nutritional counseling as well as specific goals based on current maternal and fetal status  Options for GERD therapy if becomes symptomatic over course of pregnancy  Gestational age appropriate preventive care regarding communicable disease transmission and vaccines as appropriate (including flu, TDaP, and COVID.)  Additional plans and concerns as documented in problem list.   All questions answered and concerns discussed. An electronic signature was used to authenticate this note. Lashonda Merida MD    I have spent 32 minutes reviewing previous notes, test results and face to face with the patient discussing the diagnosis and importance of compliance with the treatment plan, in addition to ultrasound findings, as well as documenting on the day of the visit (2022). Return in about 1 week (around 2022) for Growth, BPP.     Patient Active Problem List   Diagnosis Code    HIV disease affecting pregnancy in third trimester O98.713    High risk pregnancy, multigravida of advanced maternal age in third trimester O09.523    History of  section complicating pregnancy C46.776    History of myomectomy Z98.890    Anemia affecting pregnancy in third trimester O99.013    History of thyroid disease Z86.39    Pregnancy resulting from in vitro fertilization in third trimester O09.813    Anemia, iron deficiency D50.9    Cortical cataract of both eyes H26.9    Uterine fibroid in pregnancy O34.10, D25.9    Heart block AV first degree I44.0    Heart murmur R01.1    Hyperopia of both eyes H52.03    COVID-19 affecting pregnancy in first trimester O98.511, U07.1    Other forms of systemic lupus erythematosus ([facility] [address] M32.8     Visit Diagnoses         Codes    33 weeks gestation of pregnancy    -  Primary Z3A.33    Encounter for screening for maternal depression     Z13.32

## 2022-11-07 NOTE — PROGRESS NOTES
CRISTY. [id number].  33w4d   Denies LOF, VB, Ctxs. Good FM.       Vitals:    11/08/22 1315   BP: 114/72        High risk pregnancy, multigravida of advanced maternal age in third trimester  NST today REACTIVE   Kick counts and labor precautions reviewed     HIV well controlled  R/CS scheduled   Continue 2x weekly testing     Orders Placed This Encounter   Procedures    FETAL NON-STRESS TEST        Kisha Crawford DO

## 2022-11-07 NOTE — ASSESSMENT & PLAN NOTE
NST today ___  Kick counts and labor precautions reviewed     HIV well controlled  R/CS scheduled   Continue 2x weekly testing

## 2022-11-08 ENCOUNTER — ROUTINE PRENATAL (OUTPATIENT)
Dept: OBGYN CLINIC | Age: 47
End: 2022-11-08
Payer: OTHER GOVERNMENT

## 2022-11-08 ENCOUNTER — OFFICE VISIT (OUTPATIENT)
Dept: RHEUMATOLOGY | Age: 47
End: 2022-11-08
Payer: OTHER GOVERNMENT

## 2022-11-08 VITALS — SYSTOLIC BLOOD PRESSURE: 114 MMHG | WEIGHT: 180 LBS | BODY MASS INDEX: 28.19 KG/M2 | DIASTOLIC BLOOD PRESSURE: 72 MMHG

## 2022-11-08 VITALS
HEART RATE: 99 BPM | WEIGHT: 183 LBS | DIASTOLIC BLOOD PRESSURE: 76 MMHG | BODY MASS INDEX: 28.66 KG/M2 | SYSTOLIC BLOOD PRESSURE: 121 MMHG

## 2022-11-08 DIAGNOSIS — O98.712 HIV DISEASE AFFECTING PREGNANCY IN SECOND TRIMESTER: ICD-10-CM

## 2022-11-08 DIAGNOSIS — O34.219 HISTORY OF CESAREAN SECTION COMPLICATING PREGNANCY: ICD-10-CM

## 2022-11-08 DIAGNOSIS — O98.511 COVID-19 AFFECTING PREGNANCY IN FIRST TRIMESTER: ICD-10-CM

## 2022-11-08 DIAGNOSIS — Z86.39 HISTORY OF THYROID DISEASE: ICD-10-CM

## 2022-11-08 DIAGNOSIS — O09.523 HIGH RISK PREGNANCY, MULTIGRAVIDA OF ADVANCED MATERNAL AGE IN THIRD TRIMESTER: ICD-10-CM

## 2022-11-08 DIAGNOSIS — U07.1 COVID-19 AFFECTING PREGNANCY IN FIRST TRIMESTER: ICD-10-CM

## 2022-11-08 DIAGNOSIS — M32.8 OTHER FORMS OF SYSTEMIC LUPUS ERYTHEMATOSUS, UNSPECIFIED ORGAN INVOLVEMENT STATUS (HCC): Primary | ICD-10-CM

## 2022-11-08 DIAGNOSIS — O98.713 HIV DISEASE AFFECTING PREGNANCY IN THIRD TRIMESTER: Primary | ICD-10-CM

## 2022-11-08 DIAGNOSIS — E55.9 HYPOVITAMINOSIS D: ICD-10-CM

## 2022-11-08 DIAGNOSIS — R89.4 SEROLOGIC ABNORMALITY: ICD-10-CM

## 2022-11-08 DIAGNOSIS — O09.813 PREGNANCY RESULTING FROM IN VITRO FERTILIZATION IN THIRD TRIMESTER: ICD-10-CM

## 2022-11-08 DIAGNOSIS — O99.013 ANEMIA AFFECTING PREGNANCY IN THIRD TRIMESTER: ICD-10-CM

## 2022-11-08 DIAGNOSIS — D50.9 IRON DEFICIENCY ANEMIA, UNSPECIFIED IRON DEFICIENCY ANEMIA TYPE: ICD-10-CM

## 2022-11-08 DIAGNOSIS — Z98.890 HISTORY OF MYOMECTOMY: ICD-10-CM

## 2022-11-08 PROCEDURE — 99902 PR PRENATAL VISIT: CPT | Performed by: OBSTETRICS & GYNECOLOGY

## 2022-11-08 PROCEDURE — 99214 OFFICE O/P EST MOD 30 MIN: CPT | Performed by: INTERNAL MEDICINE

## 2022-11-08 PROCEDURE — 59025 FETAL NON-STRESS TEST: CPT | Performed by: OBSTETRICS & GYNECOLOGY

## 2022-11-08 NOTE — PATIENT INSTRUCTIONS
Labs - ruby, lupus panel, cbc, cmp, esr, c3, c4, UA   Monitor if any sx - larry post partum   RTC in 6 months  - call if any issues

## 2022-11-08 NOTE — PROGRESS NOTES
Subjective:      HPI:        Permanent history    Mrs. Jori Moser is a very pleasant 82-year-old -American lady with past medical history significant for iron deficiency anemia, high risk pregnancy, HIV status, questionable lupus who presents for evaluation of abnormal serological markers. Patient currently pregnant with her third child via IVF and donor egg and follows high risk OB. Patient seen by hematology for iron deficiency anemia. Patient also followed by [facility] per which she takes Epzicom, Prezista, and Norvir daily for her HIV status. Patient has 2 other children - 1st child natural-16yrs, 2nd child was ivf-her egg 6yrs, child now, egg donor but  sperm. While going to Ob/Gyn, iron was low, see Internal fetal med, OB is Dr Petra Thomas rd. OB sent to Dr Felton Ludwig fro Anemia, Dr Felton Ludwig done labs, some were abnormal.  Patient was told many years ago she has lupus markers, given high risk pregnancy these were also checked recently and mildly abnormal thus here for evaluation. Pt has one brother younger-healthy. Mom,Dab,Brother no auto immune. Mom no miscarriages or trouble. Pt has miscarriage 2 yrs ago- ivf preg her egg, and did another donor egg, pror no miscarriage. No fam hx of thyroid. Pt has over active thyroid,  seen [place] ,  in [place], was on meds for thyroid over 5 yrs ago, doent take now. HIV 2003-on antivirals. Other 2 kids healthy. Never smoker, alcohol twice monthly prior to preg    Otherwise patient works full-time in [address] [place] [address] from home. Since Last Visit  Pt here for first return. No pain. Labs in epic. Pt states she had iron infusion 8/17/and 8/25 so she hasnt taken any iron po. ROS:     Musculoskeletal ROS:   .    Normal  .    AM stiffness (hours): None  . Pain scale (0-10): 0  . Fatigue scale (0-10): 0  .    Job status: working   .     Activities of daily living: No difficulty,    positive as above with the addition of   Otherwise negative for:  Fevers, chills or sweats. Weight  stable  No headaches or scalp tenderness. No jaw claudication. No acute visual changes. No red or dry eyes. No auditory complaints. No nasal discharge or rhinorrhea or dry mouth. No oral lesions or ulcers. No sore throat. No tongue pain. No cough. No shortness of breath, dyspnea on exertion or wheezing. No hemoptysis. No chest pains or palpitations. No lightheadedness. No pedal edema. No GERD symptoms, abdominal pain,diarrhea or constipation. No increased urinary frequency, nocturia, dysuria or changes in urine color. No alopecia, skin rashes/lesions. No changes in skin tightness. No Raynaud's. Photosensitivity.         Current Outpatient Medications:     famotidine (PEPCID) 20 MG tablet, Take 1 tablet by mouth nightly as needed (reflux) (Patient not taking: Reported on 2022), Disp: 30 tablet, Rfl: 3    cetirizine (ZYRTEC) 10 MG tablet, Take 1 tablet by mouth daily, Disp: 30 tablet, Rfl: 5    ferrous sulfate (IRON 325) 325 (65 Fe) MG tablet, Take 325 mg by mouth three times a week (Patient not taking: Reported on 2022), Disp: , Rfl:     aspirin EC 81 MG EC tablet, Take 2 tablets by mouth in the morning., Disp: 60 tablet, Rfl: 10    Prenatal Vit-Fe Fumarate-FA (PRENATAL VITAMINS PO), prenatal vit 10-iron-folic-dha  1 tab daily, Disp: , Rfl:     abacavir-lamiVUDine (EPZICOM) 600-300 MG per tablet, Take 1 tablet by mouth daily, Disp: , Rfl:     darunavir (PREZISTA) 800 MG TABS tablet, Take by mouth, Disp: , Rfl:     ritonavir (NORVIR) 100 MG tablet, Take 100 mg by mouth daily , Disp: , Rfl:     No Known Allergies    Patient Active Problem List   Diagnosis    HIV disease affecting pregnancy in third trimester    High risk pregnancy, multigravida of advanced maternal age in third trimester    History of  section complicating pregnancy    History of myomectomy    Anemia affecting pregnancy in third trimester    History of thyroid disease Pregnancy resulting from in vitro fertilization in third trimester    Anemia, iron deficiency    Cortical cataract of both eyes    Uterine fibroid in pregnancy    Heart block AV first degree    Heart murmur    Hyperopia of both eyes    COVID-19 affecting pregnancy in first trimester    Other forms of systemic lupus erythematosus ([address]       Past Medical History:   Diagnosis Date    Abnormal Pap smear of cervix     Acute left-sided low back pain without sciatica 05/19/2021    Onset  at least 2 weeks ago. Reports intermittent pain that can last for at least 3 hours. Denies any trauma or injury. No leg weakness or swelling. She tried taking Motrin to help with the pain. No abdominal pain or nausea. Noticed the pain occurred after being intimate  with her spouse. Will order a urinalysis and follow up pending results. Prescribe a trial of Flexeril 10 mg at bedtime and Volt    Anemia     prescribed iron to take daily     Anemia, iron deficiency 02/27/2014    Formatting of this note might be different from the original. 2009 - noted to be Fe deficient w/ anemia, reporting menorrhagia 2014-02 fe sat = 6% hgb = 8.9 2014-05 h/h = 13/41- resolved  History: has had difficulty with anemia 2nd to heavy menses. Is not taking Fe at this time. [] chronic, untreated -- will discuss need for Fe at next visit -- check Fe panel at next visit    Autoimmune disorder ([address]     HIV disease ([address] 2003    Hyperthyroidism     No meds for several years    Infection due to yeast 8/30/2019    Complains of a vaginal discharge and feeling like there is a clump of something in her vagina. She does have some itching. She relates she has a discharge frequently after she has intercourse with her  whether he uses a condom or not. We treated her for BV in February of this year and her symptoms resolved at that time.   On exam she does have a thick white discharge in the vault and wet     Systemic lupus erythematosus ([address]        Past Surgical History:   Procedure Laterality Date     SECTION      x 2     LAP,TUBAL CAUTERY      MYOMECTOMY      laparoscopic    OTHER SURGICAL HISTORY      tubal reversal     WISDOM TOOTH EXTRACTION         Family History   Problem Relation Age of Onset    Diabetes Mother     No Known Problems Father     Alzheimer's Disease Maternal Grandmother     No Known Problems Maternal Grandfather     No Known Problems Paternal Grandmother     Prostate Cancer Paternal Grandfather     Breast Cancer Neg Hx     Colon Cancer Neg Hx     Hypertension Neg Hx        Social History     Socioeconomic History    Marital status:    Tobacco Use    Smoking status: Never    Smokeless tobacco: Never   Vaping Use    Vaping Use: Never used   Substance and Sexual Activity    Alcohol use: Not Currently    Drug use: Never    Sexual activity: Not Currently     Partners: Male   Social History Narrative    GYN: 2019  Abnormal Pap Smears: yes  Cervical Procedures: no  STDs: yes  chlamydia  and HIV (through 1st son's biological father)           Objective:      Vitals:    22 1031   BP: 121/76   Pulse: 99   Weight: 183 lb (83 kg)       PHYSICAL EXAMINATION:    General: alert, healthy, well nourished, pleasant, -American lady no acute distress  Lungs: clear to auscultation bilaterally without wheezes,  Heart: regular rate & rhythm, +S1, +S2, no murmurs  Extremities: no clubbing, cyanosis, or edema  Neuro: grossly non-focal,      MUSCULOSKELETAL:   -Hands: Normal  -Wrists: Normal  -Elbows:Normal   -Shoulders: normal   -Neck: normal   -Back: normal   -Hips: Normal  -Knees: normal   -Ankles: Normal  -Feet: Normal    Swollen Joint Count: 0  Tender Joint Count: 0    Assessment:         Mrs. Daivd Klinefelter is a very pleasant 68-year-old -American lady with past medical history significant for iron deficiency anemia, high risk pregnancy, HIV status, questionable lupus who presents for follow-up of abnormal serological markers.     Extensive review of records from PCP, laboratory data, OB and hematology summarized as - patient currently pregnant with her third child via IVF and donor egg and follows high risk OB. Patient seen by hematology for iron deficiency anemia. Patient also followed [facility] per which she takes Epzicom, Prezista, and Norvir daily for her HIV status. Patient was told in her past she has lupus markers therefore these were checked with her current pregnancy mildly abnormal positive RUBY, mildly elevated anti-RNP, mildly elevated SSB. Clinical picture shows no overt signs or symptoms of lupus or other autoimmune inflammatory process. Nevertheless patient does have mildly elevated anti-RNP and anti-SSB, which by themselves are clinically insignificant however given pregnancy prudent to check further serologies. Patient was reassured that generally the risk of  heart block is most associated with anti-SSA. Rest of her work-up from last visit including a PLS antibodies, antimitochondrial antibodies, antismooth muscle antibodies, hepatitis panel all unremarkable. Patient reminded about signs and symptoms of lupus to watch for, generally if symptoms are to flareup most common in postpartum. Otherwise routine follow-up in 6 months. Treatment plan was discussed in detail with patient. Agreement and understanding of the plan was verbalized by the patient. Total visit time   35 minutes, greater than half of the time was spent on counseling.         Plan:     Labs - ruby, lupus panel, cbc, cmp, esr, c3, c4, UA   Monitor if any sx - larry post partum   RTC in 6 months  - call if any issues

## 2022-11-11 ENCOUNTER — ROUTINE PRENATAL (OUTPATIENT)
Dept: OBGYN CLINIC | Age: 47
End: 2022-11-11
Payer: OTHER GOVERNMENT

## 2022-11-11 VITALS — SYSTOLIC BLOOD PRESSURE: 115 MMHG | DIASTOLIC BLOOD PRESSURE: 69 MMHG

## 2022-11-11 DIAGNOSIS — Z98.890 HISTORY OF MYOMECTOMY: ICD-10-CM

## 2022-11-11 DIAGNOSIS — O99.013 ANEMIA AFFECTING PREGNANCY IN THIRD TRIMESTER: ICD-10-CM

## 2022-11-11 DIAGNOSIS — O09.523 HIGH RISK PREGNANCY, MULTIGRAVIDA OF ADVANCED MATERNAL AGE IN THIRD TRIMESTER: ICD-10-CM

## 2022-11-11 DIAGNOSIS — O98.713 HIV DISEASE AFFECTING PREGNANCY IN THIRD TRIMESTER: Primary | ICD-10-CM

## 2022-11-11 DIAGNOSIS — O34.219 HISTORY OF CESAREAN SECTION COMPLICATING PREGNANCY: ICD-10-CM

## 2022-11-11 DIAGNOSIS — Z86.39 HISTORY OF THYROID DISEASE: ICD-10-CM

## 2022-11-11 DIAGNOSIS — R01.1 HEART MURMUR: ICD-10-CM

## 2022-11-11 DIAGNOSIS — U07.1 COVID-19 AFFECTING PREGNANCY IN FIRST TRIMESTER: ICD-10-CM

## 2022-11-11 DIAGNOSIS — O98.511 COVID-19 AFFECTING PREGNANCY IN FIRST TRIMESTER: ICD-10-CM

## 2022-11-11 DIAGNOSIS — D25.9 UTERINE FIBROID IN PREGNANCY: ICD-10-CM

## 2022-11-11 DIAGNOSIS — Z3A.34 34 WEEKS GESTATION OF PREGNANCY: ICD-10-CM

## 2022-11-11 DIAGNOSIS — O34.10 UTERINE FIBROID IN PREGNANCY: ICD-10-CM

## 2022-11-11 DIAGNOSIS — O09.813 PREGNANCY RESULTING FROM IN VITRO FERTILIZATION IN THIRD TRIMESTER: ICD-10-CM

## 2022-11-11 PROCEDURE — 76816 OB US FOLLOW-UP PER FETUS: CPT | Performed by: OBSTETRICS & GYNECOLOGY

## 2022-11-11 PROCEDURE — 96127 BRIEF EMOTIONAL/BEHAV ASSMT: CPT | Performed by: OBSTETRICS & GYNECOLOGY

## 2022-11-11 PROCEDURE — 76819 FETAL BIOPHYS PROFIL W/O NST: CPT | Performed by: OBSTETRICS & GYNECOLOGY

## 2022-11-11 PROCEDURE — 99215 OFFICE O/P EST HI 40 MIN: CPT | Performed by: OBSTETRICS & GYNECOLOGY

## 2022-11-11 ASSESSMENT — PATIENT HEALTH QUESTIONNAIRE - PHQ9
SUM OF ALL RESPONSES TO PHQ9 QUESTIONS 1 & 2: 0
SUM OF ALL RESPONSES TO PHQ QUESTIONS 1-9: 0
SUM OF ALL RESPONSES TO PHQ QUESTIONS 1-9: 0
1. LITTLE INTEREST OR PLEASURE IN DOING THINGS: 0
2. FEELING DOWN, DEPRESSED OR HOPELESS: 0
SUM OF ALL RESPONSES TO PHQ QUESTIONS 1-9: 0
SUM OF ALL RESPONSES TO PHQ QUESTIONS 1-9: 0

## 2022-11-11 NOTE — PROGRESS NOTES
MFM Consultation    Annie Cartagena (: 1975) is a 41 y.o. [id number] at 34w1d with 2022, by Ultrasound. Presents for evaluation of the following chief complaint(s):  Pregnancy Ultrasound (Growth and BPP) and High Risk Pregnancy (AMA, HIV, Anemia, C/S X2, fibroids, +covid in pregnancy, IVF)     Patient is working full time with [employer]. Scheduled to see Primary OB [facility]) on 11/15/22; Hemoc on . Denies HA or edema. Denies preeclamptic symptoms. Reports fetal movement. No bleeding, LOF, cramping, ctxs, or vaginal pressure. Interval history since prior appt reviewed and updated as indicated. Doing well. No significant concerns. Reviewed risks of preE with donor egg IVF, particularly with AMA status. HIV stable, last labs available 22, no issues with meds. Interval history since prior appt reviewed and updated as indicated. Review of Systems - per HPI; otherwise unremarkable. Exam:     Vitals:    22 1041   BP: 115/69       Ultrasound: Please see formal ultrasound report under imaging tab. ASSESSMENT/PLAN:  Patient Active Problem List    Diagnosis Date Noted    High risk pregnancy, multigravida of advanced maternal age in third trimester 2022     Priority: High     Conceived via IVF with donor age Pinky Prescott)  Plans for first screen with MFM  Asa 162 mg qhs   22 at [facility]: Normal NT 1.4 mm and nasal bone. IVF Donor Egg from 21year old, low risk of aneuploidy. Genetic counseling done by MD.  Declines Genetic Testing. Patient with multiple Medical Conditions that complicate this pregnancy. See each individual Diagnosis Overview for details. Talked with patient about each diagnosis and plan. Patient has excellent knowledge of her conditions. She agrees with POC. Summary of Current Plan  1) History of Myomectomy-Hysteroscopic removal. Multiple small fibroids noted. Increase vascularity see at lower segment.  Will follow for PAS with each US.  2) HIV Positive, long standing condition- On HIIT Therapy, last Quant undetectable. Defer Management to ID at [facility]. Will let Neonatologists know prior to delivery. 3) Anemia-Severe Anemia, not tolerating po Fe, N/V. Needs IV Fe in 2nd trimester. 4) Covid-Recent infection, improving, no SOB. 5) Advanced AMA-Donor egg of 21year old, very low risk of Aneuploidy with Nl NT/NB. Very High Risk of PreE, taking 162 mgs ASA. Multiple MFM Visits  22 at [facility]: Excessive fetal growth; AC 90%, Overall 84%, CHRIS 18 cm, Dopplers WNL, BPP . Stressed importance of low carb diet since delivering at 38 weeks to promote lung maturity. Extensive diet teaching done    Sent for HIV PCR to be checked at Lab  Once weekly fetal testing shared between [facility] office and [facility], alternating weeks. Repeat C/S at 37-39 weeks due to multiple morbidities and high risk conditions in this pregnancy      Other forms of systemic lupus erythematosus ([facility] [address] 2022     Priority: Medium     22 Hematology: La Ab is increased at 4.9. MFM aware. Will recheck fetal HR at [place] and advise on further need for  testing   10/21/2022 [facility]: no heart block noted and unlikely to develop at this gestational age. COVID-19 affecting pregnancy in first trimester 2022     Priority: Medium     22 at [facility]:  Testing positive for Covid19 on . Symptoms have been mild. Headaches, fevers now just feeling congested, no SOB. Vital signs 127/88, P82, Pulse Ox 100%. 22 at [facility]:  Denies symptoms  No further testing needed        History of myomectomy 2022     Priority: Medium     [facility] [place] at outside facility in  in [place], [place]  Had term repeat C/S following surgery so assuming not in contractile portion         Anemia affecting pregnancy in third trimester 2018     Priority: Medium     Severe anemia noted on IPV   Hg 9.0, microcytic   Iron panel/electrophoresis today and start iron BID. Referral to heme/onc for evaluation of IV iron      - noted to be Fe deficient w/ anemia, reporting menorrhagia  2014 fe sat = 6% hgb = 8.9  2014 h/h = - resolved    History: has had difficulty with anemia 2nd to heavy menses. Is not taking Fe at this time. [] chronic, untreated  -- will discuss need for Fe at next visit  -- check Fe panel at next visit    22 at [facility]: HGB on  9.0. Was started on iron BID.     unable to tolerate PO iron. Vomits with each use.   22 at [facility]: Scheduled for injectafer injections with Hematology on 22 s/p IV iron with Hg 11.6                Pregnancy resulting from in vitro fertilization in third trimester 2016     Priority: Medium     Donor egg. Embryos were not genetically tested   Asa 162 qhs   Planning first screen with MFM  22 at [facility]: Denies genetic testing today. Patient reassured by ultrasound today   2022 [facility]: reassuring fetal growth and followup anatomy      Uterine fibroid in pregnancy 2015     Priority: Medium       22 at [facility]: several small fibroids seen on ultrasound today       HIV disease affecting pregnancy in third trimester 2014     Priority: Medium     22 at [facility]: Patient was diagnosed with HIV in . Managed with [employer] every 6 months. Next appointment is in July. Last appointment viral load per patient was undetectable. Taking Epzicom, Prezista, and Norvir daily. Would not change any treatment, watch for FGR with serial US.     2022 [facility]: labs reviewed. VL undetectable , reassuring CD4 count. Continues to do well with HAART. Reviewed delivery medication plans- remain on current dose;  will need AZT  22 at [facility]: stable on medications. Needs VL at 35-36 weeks prior to c/s.    22 at [facility]: Will have pt go to lab to get HIV VL checked. If VL undetectable does not need AZT prior to CS.   Patient to take her current med every night, night before and night after C/S. Anemia, iron deficiency 2014     Priority: Medium      - noted to be Fe deficient w/ anemia, reporting menorrhagia  2014 fe sat = 6% hgb = 8.9  2014 h/h = - resolved    History: has had difficulty with anemia 2nd to heavy menses. Is not taking Fe at this time. [] chronic, untreated  -- will discuss need for Fe at next visit  -- check Fe panel at next visit    22 at [facility]: Hgb 12.3 on 10/17. Received iron infusion over the summer and has since stopped taking po iron supplement. History of  section complicating pregnancy      Priority: Low     Hx of C/S x 2 at outside facility   1 Arrest  2 Elective repeat ()        History of thyroid disease 2022     Priority: Low     Hx of autoimmune thyroid dz. She is unsure the cause of her hyperthyroidism. She was treated with PTU in the past.  This was diagnosed many years ago. She will get labs done at [facility]. Apparently euthyroid for the past several years. Has not been on meds for years. TSH normal on IPV labs     22 at [facility]: has not been on meds for years. Labs  Free T4 0.9, TSH 0.477      Cortical cataract of both eyes 2022     Priority: Low    Hyperopia of both eyes 2021     Priority: Low    Heart murmur 2018     Priority: Low     22 at [facility]:  Patient reports being diagnosed after leaving hospital in  after having her son.   Has had no issues and does not see a cardiologist         Heart block AV first degree 2014     Priority: Low     2011 1st degree heart block on Reyataz, epzicom; improved off reyataz; ECHO = normal                    Addressed normal pregnancy complaints, reassured and offered suggestions for care  Reviewed gestational age precautions and activity goals/limitations  Nutritional counseling as well as specific goals based on current maternal and fetal status  Options for GERD therapy if becomes symptomatic over course of pregnancy  Gestational age appropriate preventive care regarding communicable disease transmission and vaccines as appropriate (including flu, TDaP, and COVID.)  Additional plans and concerns as documented in problem list.   All questions answered and concerns discussed. An electronic signature was used to authenticate this note. Tim Dumont MD    I have spent 40 minutes reviewing previous notes, test results and face to face with the patient discussing the diagnosis and importance of compliance with the treatment plan, in addition to ultrasound findings, as well as documenting on the day of the visit (2022). Return in about 1 week (around 2022) for growth.       Patient Active Problem List   Diagnosis Code    HIV disease affecting pregnancy in third trimester O98.713    High risk pregnancy, multigravida of advanced maternal age in third trimester O09.523    History of  section complicating pregnancy N63.937    History of myomectomy Z98.890    Anemia affecting pregnancy in third trimester O99.013    History of thyroid disease Z86.39    Pregnancy resulting from in vitro fertilization in third trimester O09.813    Anemia, iron deficiency D50.9    Cortical cataract of both eyes H26.9    Uterine fibroid in pregnancy O34.10, D25.9    Heart block AV first degree I44.0    Heart murmur R01.1    Hyperopia of both eyes H52.03    COVID-19 affecting pregnancy in first trimester O98.511, U07.1    Other forms of systemic lupus erythematosus ([facility] [address] M32.8     Visit Diagnoses         Codes    34 weeks gestation of pregnancy     Z3A.34

## 2022-11-14 DIAGNOSIS — D50.9 IRON DEFICIENCY ANEMIA, UNSPECIFIED IRON DEFICIENCY ANEMIA TYPE: Primary | ICD-10-CM

## 2022-11-15 ENCOUNTER — TELEPHONE (OUTPATIENT)
Dept: ONCOLOGY | Age: 47
End: 2022-11-15

## 2022-11-15 ENCOUNTER — ROUTINE PRENATAL (OUTPATIENT)
Dept: OBGYN CLINIC | Age: 47
End: 2022-11-15

## 2022-11-15 VITALS — SYSTOLIC BLOOD PRESSURE: 116 MMHG | WEIGHT: 181 LBS | DIASTOLIC BLOOD PRESSURE: 74 MMHG | BODY MASS INDEX: 28.35 KG/M2

## 2022-11-15 DIAGNOSIS — O98.713 HIV DISEASE AFFECTING PREGNANCY IN THIRD TRIMESTER: ICD-10-CM

## 2022-11-15 DIAGNOSIS — O09.813 PREGNANCY RESULTING FROM IN VITRO FERTILIZATION IN THIRD TRIMESTER: ICD-10-CM

## 2022-11-15 DIAGNOSIS — D50.9 IRON DEFICIENCY ANEMIA, UNSPECIFIED IRON DEFICIENCY ANEMIA TYPE: ICD-10-CM

## 2022-11-15 DIAGNOSIS — Z86.39 HISTORY OF THYROID DISEASE: ICD-10-CM

## 2022-11-15 DIAGNOSIS — O99.013 ANEMIA AFFECTING PREGNANCY IN THIRD TRIMESTER: ICD-10-CM

## 2022-11-15 DIAGNOSIS — Z98.890 HISTORY OF MYOMECTOMY: Primary | ICD-10-CM

## 2022-11-15 DIAGNOSIS — O98.511 COVID-19 AFFECTING PREGNANCY IN FIRST TRIMESTER: ICD-10-CM

## 2022-11-15 DIAGNOSIS — M32.8 OTHER FORMS OF SYSTEMIC LUPUS ERYTHEMATOSUS, UNSPECIFIED ORGAN INVOLVEMENT STATUS (HCC): ICD-10-CM

## 2022-11-15 DIAGNOSIS — R89.4 SEROLOGIC ABNORMALITY: ICD-10-CM

## 2022-11-15 DIAGNOSIS — U07.1 COVID-19 AFFECTING PREGNANCY IN FIRST TRIMESTER: ICD-10-CM

## 2022-11-15 DIAGNOSIS — E55.9 HYPOVITAMINOSIS D: ICD-10-CM

## 2022-11-15 DIAGNOSIS — O98.712 HIV DISEASE AFFECTING PREGNANCY IN SECOND TRIMESTER: ICD-10-CM

## 2022-11-15 DIAGNOSIS — O34.219 HISTORY OF CESAREAN SECTION COMPLICATING PREGNANCY: ICD-10-CM

## 2022-11-15 DIAGNOSIS — O09.523 HIGH RISK PREGNANCY, MULTIGRAVIDA OF ADVANCED MATERNAL AGE IN THIRD TRIMESTER: ICD-10-CM

## 2022-11-15 LAB
ALBUMIN SERPL-MCNC: 3.2 G/DL (ref 3.5–5)
ALBUMIN/GLOB SERPL: 0.8 {RATIO} (ref 0.4–1.6)
ALP SERPL-CCNC: 143 U/L (ref 50–136)
ALT SERPL-CCNC: 34 U/L (ref 12–65)
ANION GAP SERPL CALC-SCNC: 8 MMOL/L (ref 2–11)
AST SERPL-CCNC: 29 U/L (ref 15–37)
BASOPHILS # BLD: 0.1 K/UL (ref 0–0.2)
BASOPHILS NFR BLD: 1 % (ref 0–2)
BILIRUB SERPL-MCNC: 0.5 MG/DL (ref 0.2–1.1)
BUN SERPL-MCNC: 7 MG/DL (ref 6–23)
CALCIUM SERPL-MCNC: 9.3 MG/DL (ref 8.3–10.4)
CHLORIDE SERPL-SCNC: 105 MMOL/L (ref 101–110)
CO2 SERPL-SCNC: 22 MMOL/L (ref 21–32)
CREAT SERPL-MCNC: 0.8 MG/DL (ref 0.6–1)
DIFFERENTIAL METHOD BLD: ABNORMAL
EOSINOPHIL # BLD: 0.1 K/UL (ref 0–0.8)
EOSINOPHIL NFR BLD: 2 % (ref 0.5–7.8)
ERYTHROCYTE [DISTWIDTH] IN BLOOD BY AUTOMATED COUNT: 15.5 % (ref 11.9–14.6)
FERRITIN SERPL-MCNC: 25 NG/ML (ref 8–388)
GLOBULIN SER CALC-MCNC: 3.8 G/DL (ref 2.8–4.5)
GLUCOSE SERPL-MCNC: 100 MG/DL (ref 65–100)
HCT VFR BLD AUTO: 34.9 % (ref 35.8–46.3)
HGB BLD-MCNC: 12.1 G/DL (ref 11.7–15.4)
IMM GRANULOCYTES # BLD AUTO: 0.1 K/UL (ref 0–0.5)
IMM GRANULOCYTES NFR BLD AUTO: 2 % (ref 0–5)
IRON SATN MFR SERPL: 14 %
IRON SERPL-MCNC: 65 UG/DL (ref 35–150)
LYMPHOCYTES # BLD: 1 K/UL (ref 0.5–4.6)
LYMPHOCYTES NFR BLD: 18 % (ref 13–44)
MCH RBC QN AUTO: 32.8 PG (ref 26.1–32.9)
MCHC RBC AUTO-ENTMCNC: 34.7 G/DL (ref 31.4–35)
MCV RBC AUTO: 94.6 FL (ref 82–102)
MONOCYTES # BLD: 0.6 K/UL (ref 0.1–1.3)
MONOCYTES NFR BLD: 10 % (ref 4–12)
NEUTS SEG # BLD: 3.6 K/UL (ref 1.7–8.2)
NEUTS SEG NFR BLD: 67 % (ref 43–78)
NRBC # BLD: 0 K/UL (ref 0–0.2)
PLATELET # BLD AUTO: 224 K/UL (ref 150–450)
PMV BLD AUTO: 10.5 FL (ref 9.4–12.3)
POTASSIUM SERPL-SCNC: 3.7 MMOL/L (ref 3.5–5.1)
PROT SERPL-MCNC: 7 G/DL (ref 6.3–8.2)
RBC # BLD AUTO: 3.69 M/UL (ref 4.05–5.2)
SODIUM SERPL-SCNC: 135 MMOL/L (ref 133–143)
TIBC SERPL-MCNC: 481 UG/DL (ref 250–450)
WBC # BLD AUTO: 5.5 K/UL (ref 4.3–11.1)

## 2022-11-15 PROCEDURE — 99902 PR PRENATAL VISIT: CPT | Performed by: OBSTETRICS & GYNECOLOGY

## 2022-11-15 NOTE — ASSESSMENT & PLAN NOTE
Per MFM    11/11/22 at [facility]: Will have pt go to lab to get HIV VL checked. · If VL undetectable does not need AZT prior to CS. · Patient to take her current med every night, night before and night after C/S.

## 2022-11-15 NOTE — PROGRESS NOTES
CRISTY. [id number].  34w5d   Denies LOF, VB, Ctxs. Good FM. Vitals:    11/15/22 1033   BP: 116/74        HIV disease affecting pregnancy in third trimester  Per MFM    11/11/22 at [facility]: Will have pt go to lab to get HIV VL checked. If VL undetectable does not need AZT prior to CS. Patient to take her current med every night, night before and night after C/S. High risk pregnancy, multigravida of advanced maternal age in third trimester  Last growth 11/11  NSTs here and BPPs on Friday with MFM   R/CS scheduled   Kick counts and labor precautions reviewed     Anesthesia consult PENDING     Anemia, iron deficiency  S/p IV iron.  Improved      Orders Placed This Encounter   Procedures    FETAL NON-STRESS TEST          Kisha Crawford DO

## 2022-11-15 NOTE — ASSESSMENT & PLAN NOTE
Last growth 11/11  NSTs here and BPPs on Friday with MFM   R/CS scheduled   Kick counts and labor precautions reviewed     Anesthesia consult ___

## 2022-11-17 LAB
HIV1 RNA # SERPL NAA+PROBE: <20 COPIES/ML
HIV1 RNA SERPL NAA+PROBE-LOG#: NORMAL LOG10COPY/ML

## 2022-11-18 ENCOUNTER — CLINICAL DOCUMENTATION (OUTPATIENT)
Dept: ONCOLOGY | Age: 47
End: 2022-11-18

## 2022-11-22 ENCOUNTER — ROUTINE PRENATAL (OUTPATIENT)
Dept: OBGYN CLINIC | Age: 47
End: 2022-11-22
Payer: OTHER GOVERNMENT

## 2022-11-22 VITALS — DIASTOLIC BLOOD PRESSURE: 72 MMHG | SYSTOLIC BLOOD PRESSURE: 121 MMHG

## 2022-11-22 DIAGNOSIS — D25.9 UTERINE FIBROID IN PREGNANCY: ICD-10-CM

## 2022-11-22 DIAGNOSIS — Z3A.35 35 WEEKS GESTATION OF PREGNANCY: ICD-10-CM

## 2022-11-22 DIAGNOSIS — O34.10 UTERINE FIBROID IN PREGNANCY: ICD-10-CM

## 2022-11-22 DIAGNOSIS — O98.511 COVID-19 AFFECTING PREGNANCY IN FIRST TRIMESTER: ICD-10-CM

## 2022-11-22 DIAGNOSIS — Z86.39 HISTORY OF THYROID DISEASE: ICD-10-CM

## 2022-11-22 DIAGNOSIS — O99.013 ANEMIA AFFECTING PREGNANCY IN THIRD TRIMESTER: ICD-10-CM

## 2022-11-22 DIAGNOSIS — Z98.890 HISTORY OF MYOMECTOMY: ICD-10-CM

## 2022-11-22 DIAGNOSIS — O09.523 HIGH RISK PREGNANCY, MULTIGRAVIDA OF ADVANCED MATERNAL AGE IN THIRD TRIMESTER: ICD-10-CM

## 2022-11-22 DIAGNOSIS — O34.219 HISTORY OF CESAREAN SECTION COMPLICATING PREGNANCY: ICD-10-CM

## 2022-11-22 DIAGNOSIS — U07.1 COVID-19 AFFECTING PREGNANCY IN FIRST TRIMESTER: ICD-10-CM

## 2022-11-22 DIAGNOSIS — O98.713 HIV DISEASE AFFECTING PREGNANCY IN THIRD TRIMESTER: Primary | ICD-10-CM

## 2022-11-22 DIAGNOSIS — O09.813 PREGNANCY RESULTING FROM IN VITRO FERTILIZATION IN THIRD TRIMESTER: ICD-10-CM

## 2022-11-22 PROCEDURE — 76819 FETAL BIOPHYS PROFIL W/O NST: CPT | Performed by: OBSTETRICS & GYNECOLOGY

## 2022-11-22 PROCEDURE — 76815 OB US LIMITED FETUS(S): CPT | Performed by: OBSTETRICS & GYNECOLOGY

## 2022-11-22 PROCEDURE — 99215 OFFICE O/P EST HI 40 MIN: CPT | Performed by: OBSTETRICS & GYNECOLOGY

## 2022-11-22 ASSESSMENT — PATIENT HEALTH QUESTIONNAIRE - PHQ9
SUM OF ALL RESPONSES TO PHQ QUESTIONS 1-9: 0
2. FEELING DOWN, DEPRESSED OR HOPELESS: 0
SUM OF ALL RESPONSES TO PHQ QUESTIONS 1-9: 0
1. LITTLE INTEREST OR PLEASURE IN DOING THINGS: 0
SUM OF ALL RESPONSES TO PHQ9 QUESTIONS 1 & 2: 0

## 2022-11-22 NOTE — PROGRESS NOTES
MFM Follow-up Visit    Guadalupe Farias (: 1975) is a 41 y.o. [id number] at 35w5d with 2022, by Ultrasound. Presents for evaluation of the following chief complaint(s):  Pregnancy Ultrasound (BPP) and High Risk Pregnancy (AMA, HIV, Anemia, C/S X2, fibroids, +covid in pregnancy, IVF)     Patient is working full time with [email or web address]. Scheduled to see Primary OB [facility]) on 22; C/S scheduled for 22     Denies HA or edema. Denies preeclamptic symptoms. Reports good fetal movement. No bleeding, LOF, cramping, ctxs, or vaginal pressure. Interval history since prior appt reviewed and updated as indicated. Doing well. No significant concerns. Reviewed risks of preE with donor egg IVF, particularly with AMA status. HIV stable, last labs available 22, no issues with meds. Interval history since prior appt reviewed and updated as indicated. Review of Systems - per HPI; otherwise unremarkable. Exam:     Vitals:    22 1503   BP: 121/72     Recent Mood: happy  Recent Labs reviewed and addressed. Ultrasound: Please see formal ultrasound report under imaging tab. ASSESSMENT/PLAN:  Patient Active Problem List    Diagnosis Date Noted    High risk pregnancy, multigravida of advanced maternal age in third trimester 2022     Priority: High     Conceived via IVF with donor age Chriss Lehman)  Plans for first screen with MFM  Asa 162 mg qhs   22 at [facility]: Normal NT 1.4 mm and nasal bone. IVF Donor Egg from 21year old, low risk of aneuploidy. Genetic counseling done by MD.  Declines Genetic Testing. Patient with multiple Medical Conditions that complicate this pregnancy. See each individual Diagnosis Overview for details. Talked with patient about each diagnosis and plan. Patient has excellent knowledge of her conditions. She agrees with POC. Summary of Current Plan  1) History of Myomectomy-Hysteroscopic removal. Multiple small fibroids noted.  Increase vascularity see at lower segment. Will follow for PAS with each US. 2) HIV Positive, long standing condition- On HIIT Therapy, last Quant undetectable. Defer Management to ID at [facility]. Will let Neonatologists know prior to delivery. 3) Anemia-Severe Anemia, not tolerating po Fe, N/V. Needs IV Fe in 2nd trimester. 4) Covid-Recent infection, improving, no SOB. 5) Advanced AMA-Donor egg of 21year old, very low risk of Aneuploidy with Nl NT/NB. Very High Risk of PreE, taking 162 mgs ASA. Multiple MFM Visits  22 at [facility]: Excessive fetal growth; AC 90%, Overall 84%, CHRIS 18 cm, Dopplers WNL, BPP . Stressed importance of low carb diet since delivering at 38 weeks to promote lung maturity. Extensive diet teaching done. 11/15 HIV Viral Load undetectable. 2022 at [facility]-Reassuring fetal status, BPP/. Extensive diet teaching done. 11/15 HIV Viral Load undetectable. Discussed with patient and , recommend repeat C/S at 38 weeks due to very AMA. Patient told to continue current HIV meds, she takes every evening. To take night before surgery, and night after am surgery. Does not need any other medications. Notify NICU regarding HIV status on day of delivery. Once weekly fetal testing at [facility], office, OB office notified. Repeat C/S at 38 weeks due to multiple morbidities and high risk conditions in this pregnancy. No [facility] follow up. Other forms of systemic lupus erythematosus ([facility] [address] 2022     Priority: Medium     22 Hematology: La Ab is increased at 4.9. MFM aware. Will recheck fetal HR at [place] and advise on further need for  testing   10/21/2022 [facility]: no heart block noted and unlikely to develop at this gestational age. COVID-19 affecting pregnancy in first trimester 2022     Priority: Medium     22 at [facility]:  Testing positive for Covid19 on . Symptoms have been mild. Headaches, fevers now just feeling congested, no SOB.   Vital signs 127/88, P82, Pulse Ox 100%. 08/11/22 at [facility]:  Denies symptoms  No further testing needed        History of myomectomy 06/08/2022     Priority: Medium     [facility] myomectomy at outside facility in 2010 in [place], [place]  Had term repeat C/S following surgery so assuming not in contractile portion         Anemia affecting pregnancy in third trimester 03/28/2018     Priority: Medium     Severe anemia noted on IPV   Hg 9.0, microcytic   Iron panel/electrophoresis today and start iron BID. Referral to heme/onc for evaluation of IV iron     2009 - noted to be Fe deficient w/ anemia, reporting menorrhagia  2014-02 fe sat = 6% hgb = 8.9  2014-05 h/h = 13/41- resolved    History: has had difficulty with anemia 2nd to heavy menses. Is not taking Fe at this time. [] chronic, untreated  -- will discuss need for Fe at next visit  -- check Fe panel at next visit    06/16/22 at [facility]: HGB on 6/6 9.0. Was started on iron BID.    7/07 unable to tolerate PO iron. Vomits with each use.   08/11/22 at [facility]: Scheduled for injectafer injections with Hematology on 8/17/22 9/16/22 s/p IV iron with Hg 11.6                Pregnancy resulting from in vitro fertilization in third trimester 01/14/2016     Priority: Medium     Donor egg. Embryos were not genetically tested   Asa 162 qhs   Planning first screen with MFM  06/16/22 at [facility]: Denies genetic testing today. Patient reassured by ultrasound today   9/9/2022 [facility]: reassuring fetal growth and followup anatomy      Uterine fibroid in pregnancy 06/09/2015     Priority: Medium       06/16/22 at [facility]: several small fibroids seen on ultrasound today       HIV disease affecting pregnancy in third trimester 05/27/2014     Priority: Medium     06/16/22 at [facility]: Patient was diagnosed with HIV in 2003. Managed with [employer] every 6 months. Next appointment is in July. Last appointment viral load per patient was undetectable. Taking Epzicom, Prezista, and Norvir daily.  Would not change any treatment, watch for FGR with serial US.     2022 [facility]: labs reviewed. VL undetectable , reassuring CD4 count. Continues to do well with HAART. Reviewed delivery medication plans- remain on current dose;  will need AZT  22 at [facility]: stable on medications. Needs VL at 35-36 weeks prior to c/s.    22 at [facility]: Will have pt go to lab to get HIV VL checked. 2022 at [facility]: VL Undetectable. See HR Overview for delivery plan. Patient to take her current med every night, night before and night after C/S. Anemia, iron deficiency 2014     Priority: Medium      - noted to be Fe deficient w/ anemia, reporting menorrhagia  2014 fe sat = 6% hgb = 8.9  2014 h/h = - resolved    History: has had difficulty with anemia 2nd to heavy menses. Is not taking Fe at this time. [] chronic, untreated  -- will discuss need for Fe at next visit  -- check Fe panel at next visit    22 at [facility]: Hgb 12.3 on 10/17. Received iron infusion over the summer and has since stopped taking po iron supplement. History of  section complicating pregnancy      Priority: Low     Hx of C/S x 2 at outside facility   1 Arrest  2 Elective repeat ()      Failed spinal x 2 and required general anesthesia for both of her prior      History of thyroid disease 2022     Priority: Low     Hx of autoimmune thyroid dz. She is unsure the cause of her hyperthyroidism. She was treated with PTU in the past.  This was diagnosed many years ago. She will get labs done at [facility]. Apparently euthyroid for the past several years. Has not been on meds for years. TSH normal on IPV labs     22 at [facility]: has not been on meds for years.   Labs  Free T4 0.9, TSH 0.477      Cortical cataract of both eyes 2022     Priority: Low    Hyperopia of both eyes 2021     Priority: Low    Heart murmur 2018     Priority: Low     22 at [facility]:  Patient reports being diagnosed after leaving hospital in 2006 after having her son. Has had no issues and does not see a cardiologist         Heart block AV first degree 2014     Priority: Low     - 1st degree heart block on Reyataz, epzicom; improved off reyataz; ECHO = normal                    Mood evaluated today; PHQ2 reviewed. Counseling re possibility of peripartum worsening. Mood Reassuring today     Addressed normal pregnancy complaints, reassured and offered suggestions for care  Reviewed gestational age precautions and activity goals/limitations  Nutritional counseling as well as specific goals based on current maternal and fetal status  Options for GERD therapy if becomes symptomatic over course of pregnancy  Gestational age appropriate preventive care regarding communicable disease transmission and vaccines as appropriate (including flu, TDaP, and COVID.)  Additional plans and concerns as documented in problem list.   All questions answered and concerns discussed. An electronic signature was used to authenticate this note. Perla Mcfarland MD    I have spent 40 minutes reviewing previous notes, test results and face to face with the patient discussing the diagnosis and importance of compliance with the treatment plan, in addition to ultrasound findings, as well as documenting on the day of the visit (2022). Return for No Follow-Up.     Patient Active Problem List   Diagnosis Code    HIV disease affecting pregnancy in third trimester O98.713    High risk pregnancy, multigravida of advanced maternal age in third trimester O09.523    History of  section complicating pregnancy H49.601    History of myomectomy Z98.890    Anemia affecting pregnancy in third trimester O99.013    History of thyroid disease Z86.39    Pregnancy resulting from in vitro fertilization in third trimester O09.813    Anemia, iron deficiency D50.9    Cortical cataract of both eyes H26.9    Uterine fibroid in pregnancy O34.10, D25.9    Heart block AV first degree I44.0    Heart murmur R01.1    Hyperopia of both eyes H52.03    COVID-19 affecting pregnancy in first trimester O98.511, U07.1    Other forms of systemic lupus erythematosus ([facility] [address] M32.8     Visit Diagnoses         Codes    35 weeks gestation of pregnancy     Z3A.35

## 2022-11-29 ENCOUNTER — ANESTHESIA EVENT (OUTPATIENT)
Dept: SURGERY | Age: 47
End: 2022-11-29

## 2022-11-29 ENCOUNTER — ANESTHESIA (OUTPATIENT)
Dept: SURGERY | Age: 47
End: 2022-11-29

## 2022-11-29 ENCOUNTER — HOSPITAL ENCOUNTER (OUTPATIENT)
Dept: LABOR AND DELIVERY | Age: 47
Discharge: HOME | End: 2022-12-02

## 2022-11-29 NOTE — ANESTHESIA PRE PROCEDURE
Department of Anesthesiology  Preprocedure Note       Name:  Mj Solomon   Age:  41 y.o.  :  1975                                          MRN:  [id number]         Date:  2022      Surgeon: * Surgery not found *    Procedure:     Medications prior to admission:   Prior to Admission medications    Medication Sig Start Date End Date Taking? Authorizing Provider   famotidine (PEPCID) 20 MG tablet Take 1 tablet by mouth nightly as needed (reflux) 22   Kisha Crawford, DO   aspirin EC 81 MG EC tablet Take 2 tablets by mouth in the morning. 8/3/22   Kisha Crawford, DO   Prenatal Vit-Fe Fumarate-FA (PRENATAL VITAMINS PO) prenatal vit 10-iron-folic-dha   1 tab daily    Historical Provider, MD   abacavir-lamiVUDine (EPZICOM) 600-300 MG per tablet Take 1 tablet by mouth daily    Ar Automatic Reconciliation   darunavir (PREZISTA) 800 MG TABS tablet Take by mouth    Ar Automatic Reconciliation   ritonavir (NORVIR) 100 MG tablet Take 100 mg by mouth daily     Ar Automatic Reconciliation       Current medications:    Current Outpatient Medications   Medication Sig Dispense Refill    famotidine (PEPCID) 20 MG tablet Take 1 tablet by mouth nightly as needed (reflux) 30 tablet 3    aspirin EC 81 MG EC tablet Take 2 tablets by mouth in the morning. 60 tablet 10    Prenatal Vit-Fe Fumarate-FA (PRENATAL VITAMINS PO) prenatal vit 10-iron-folic-dha   1 tab daily      abacavir-lamiVUDine (EPZICOM) 600-300 MG per tablet Take 1 tablet by mouth daily      darunavir (PREZISTA) 800 MG TABS tablet Take by mouth      ritonavir (NORVIR) 100 MG tablet Take 100 mg by mouth daily        No current facility-administered medications for this encounter.        Allergies:  No Known Allergies    Problem List:    Patient Active Problem List   Diagnosis Code    HIV disease affecting pregnancy in third trimester O80.80    High risk pregnancy, multigravida of advanced maternal age in third trimester O09.523    History of  section complicating pregnancy X53.557    History of myomectomy Z98.890    Anemia affecting pregnancy in third trimester O99.013    History of thyroid disease Z86.39    Pregnancy resulting from in vitro fertilization in third trimester O09.813    Anemia, iron deficiency D50.9    Cortical cataract of both eyes H26.9    Uterine fibroid in pregnancy O34.10, D25.9    Heart block AV first degree I44.0    Heart murmur R01.1    Hyperopia of both eyes H52.03    COVID-19 affecting pregnancy in first trimester O98.511, U07.1    Other forms of systemic lupus erythematosus ([facility] M32.8       Past Medical History:        Diagnosis Date    Abnormal Pap smear of cervix     Acute left-sided low back pain without sciatica 2021    Onset  at least 2 weeks ago. Reports intermittent pain that can last for at least 3 hours. Denies any trauma or injury. No leg weakness or swelling. She tried taking Motrin to help with the pain. No abdominal pain or nausea. Noticed the pain occurred after being intimate  with her spouse. Will order a urinalysis and follow up pending results. Prescribe a trial of Flexeril 10 mg at bedtime and Volt    Anemia     prescribed iron to take daily     Anemia, iron deficiency 2014    Formatting of this note might be different from the original.  - noted to be Fe deficient w/ anemia, reporting menorrhagia 2014 fe sat = 6% hgb = 8.9 2014 h/h = 13/41- resolved  History: has had difficulty with anemia 2nd to heavy menses. Is not taking Fe at this time. [] chronic, untreated -- will discuss need for Fe at next visit -- check Fe panel at next visit    Autoimmune disorder ([facility] [address]     [facility]     Hyperthyroidism     No meds for several years    Infection due to yeast 2019    Complains of a vaginal discharge and feeling like there is a clump of something in her vagina. She does have some itching.   She relates she has a discharge frequently after she has intercourse with her  whether he uses a condom or not. We treated her for BV in February of this year and her symptoms resolved at that time. On exam she does have a thick white discharge in the vault and wet     Systemic lupus erythematosus ([address]        Past Surgical History:        Procedure Laterality Date     SECTION      x 2     LAP,TUBAL CAUTERY      MYOMECTOMY      laparoscopic    OTHER SURGICAL HISTORY      tubal reversal     WISDOM TOOTH EXTRACTION         Social History:    Social History     Tobacco Use    Smoking status: Never    Smokeless tobacco: Never   Substance Use Topics    Alcohol use: Not Currently                                Counseling given: Not Answered      Vital Signs (Current): There were no vitals filed for this visit.                                            BP Readings from Last 3 Encounters:   22 121/72   11/15/22 116/74   22 115/69       NPO Status:                                                                                 BMI:   Wt Readings from Last 3 Encounters:   11/15/22 181 lb (82.1 kg)   22 180 lb (81.6 kg)   22 183 lb (83 kg)     There is no height or weight on file to calculate BMI.    CBC:   Lab Results   Component Value Date/Time    WBC 5.5 11/15/2022 11:06 AM    RBC 3.69 11/15/2022 11:06 AM    HGB 12.1 11/15/2022 11:06 AM    HCT 34.9 11/15/2022 11:06 AM    MCV 94.6 11/15/2022 11:06 AM    RDW 15.5 11/15/2022 11:06 AM     11/15/2022 11:06 AM       CMP:   Lab Results   Component Value Date/Time     11/15/2022 11:06 AM    K 3.7 11/15/2022 11:06 AM     11/15/2022 11:06 AM    CO2 22 11/15/2022 11:06 AM    BUN 7 11/15/2022 11:06 AM    CREATININE 0.80 11/15/2022 11:06 AM    GFRAA >60 2022 11:44 AM    AGRATIO 1.2 2021 02:52 PM    LABGLOM >60 11/15/2022 11:06 AM    GLUCOSE 100 11/15/2022 11:06 AM    PROT 7.0 11/15/2022 11:06 AM    CALCIUM 9.3 11/15/2022 11:06 AM    BILITOT 0.5 11/15/2022 11:06 AM    ALKPHOS 143 11/15/2022 11:06 AM    ALKPHOS 67 03/17/2021 02:52 PM    AST 29 11/15/2022 11:06 AM    ALT 34 11/15/2022 11:06 AM       POC Tests: No results for input(s): POCGLU, POCNA, POCK, POCCL, POCBUN, POCHEMO, POCHCT in the last 72 hours. Coags: No results found for: PROTIME, INR, APTT    HCG (If Applicable): No results found for: PREGTESTUR, PREGSERUM, HCG, HCGQUANT     ABGs: No results found for: PHART, PO2ART, RAM9PWZ, JPW5VBG, BEART, C9TQRPLF     Type & Screen (If Applicable):  No results found for: LABABO, LABRH    Drug/Infectious Status (If Applicable):  Lab Results   Component Value Date/Time    HEPCAB NONREACTIVE 10/17/2022 04:03 PM       COVID-19 Screening (If Applicable):   Lab Results   Component Value Date/Time    COVID19 Detected 06/09/2022 10:45 AM           Anesthesia Evaluation    Airway: Mallampati: II  TM distance: >3 FB   Neck ROM: full  Mouth opening: > = 3 FB   Dental: normal exam         Pulmonary:Negative Pulmonary ROS and normal exam  breath sounds clear to auscultation                             Cardiovascular:Negative CV ROS  Exercise tolerance: good (>4 METS),   (+) valvular problems/murmurs:,         Rhythm: regular  Rate: normal                 ROS comment: Pt denies h/o first degree AV block (stated in chart). States only heart issue was a murmur detected at delivery of first child 16 yrs ago. Excellent exercise tolerance, pt asymptomatic, no murmur appreciated today. Neuro/Psych:   Negative Neuro/Psych ROS              GI/Hepatic/Renal: Neg GI/Hepatic/Renal ROS            Endo/Other:    (+) hyperthyroidism (h/o, no longer medicated, euthyroid), blood dyscrasia: anemia:., .                  ROS comment: HIV (treated, undetectable viral load)  SLE (uncertain diagnosis per pt.) Abdominal:             Vascular: negative vascular ROS. Other Findings:           Anesthesia Plan      spinal     ASA 2     (Pt has h/o two prior C-sections in [place], [place].   The first under epidural and the second under SAB. She reports feeling pressure and tugging during the surgeries and is asking to be sedated as soon as the baby is delivered. I explained that those sensations are normal and that we try to avoid giving sedatives prior to delivery. She understands all of this and is willing to have a SAB, recognizing she will feel pressure and tugging, and then receive IV sedatives after delivery if she so desires. She also understands that this may result in amnesia for all or part of her time in the OR.)        Anesthetic plan and risks discussed with patient.                         Althea Leonardo MD   11/29/2022

## 2022-11-30 NOTE — CONSULTS
ANESTHESIOLOGY CONSULT NOTE    Anesthesiology consult requested by: Dr. Candice Brandt. Subjective:     Tani Montero is a 41 y.o.  female who is being seen for concern regarding her anesthesia experience with her two prior C-sections. Pt reports that she found the abdominal pressure and tugging sensations experienced with prior neuraxial techniques to be unsettling and is requesting sedation immediately following delivery. Past Medical History:   Diagnosis Date    Abnormal Pap smear of cervix     Acute left-sided low back pain without sciatica 05/19/2021    Onset  at least 2 weeks ago. Reports intermittent pain that can last for at least 3 hours. Denies any trauma or injury. No leg weakness or swelling. She tried taking Motrin to help with the pain. No abdominal pain or nausea. Noticed the pain occurred after being intimate  with her spouse. Will order a urinalysis and follow up pending results. Prescribe a trial of Flexeril 10 mg at bedtime and Volt    Anemia     prescribed iron to take daily     Anemia, iron deficiency 02/27/2014    Formatting of this note might be different from the original. 2009 - noted to be Fe deficient w/ anemia, reporting menorrhagia 2014-02 fe sat = 6% hgb = 8.9 2014-05 h/h = 13/41- resolved  History: has had difficulty with anemia 2nd to heavy menses. Is not taking Fe at this time. [] chronic, untreated -- will discuss need for Fe at next visit -- check Fe panel at next visit    Autoimmune disorder ([facility] [address]     HIV disease ([facility] [address] 2003    Hyperthyroidism     No meds for several years    Infection due to yeast 8/30/2019    Complains of a vaginal discharge and feeling like there is a clump of something in her vagina. She does have some itching. She relates she has a discharge frequently after she has intercourse with her  whether he uses a condom or not. We treated her for BV in February of this year and her symptoms resolved at that time.   On exam she does have a thick white discharge in the vault and wet     Systemic lupus erythematosus ([facility] [address]       Past Surgical History:   Procedure Laterality Date     SECTION      x 2     LAP,TUBAL CAUTERY      MYOMECTOMY      laparoscopic    OTHER SURGICAL HISTORY      tubal reversal     WISDOM TOOTH EXTRACTION       Family History   Problem Relation Age of Onset    Diabetes Mother     No Known Problems Father     Alzheimer's Disease Maternal Grandmother     No Known Problems Maternal Grandfather     No Known Problems Paternal Grandmother     Prostate Cancer Paternal Grandfather     Breast Cancer Neg Hx     Colon Cancer Neg Hx     Hypertension Neg Hx       Social History     Tobacco Use    Smoking status: Never    Smokeless tobacco: Never   Substance Use Topics    Alcohol use: Not Currently       Current Outpatient Medications   Medication Sig Dispense Refill    famotidine (PEPCID) 20 MG tablet Take 1 tablet by mouth nightly as needed (reflux) 30 tablet 3    aspirin EC 81 MG EC tablet Take 2 tablets by mouth in the morning. 60 tablet 10    Prenatal Vit-Fe Fumarate-FA (PRENATAL VITAMINS PO) prenatal vit 10-iron-folic-dha   1 tab daily      abacavir-lamiVUDine (EPZICOM) 600-300 MG per tablet Take 1 tablet by mouth daily      darunavir (PREZISTA) 800 MG TABS tablet Take by mouth      ritonavir (NORVIR) 100 MG tablet Take 100 mg by mouth daily        No current facility-administered medications for this encounter. No Known Allergies     Review of Systems:       Objective: Intake and Output:    No intake/output data recorded. No intake/output data recorded. Physical Exam:   There were no vitals taken for this visit.     General Appearance:    Alert, cooperative, no distress, appears stated age   Head:    Normocephalic, without obvious abnormality, atraumatic                           Lungs:     Clear to auscultation bilaterally, respirations unlabored   Chest Wall:    No tenderness or deformity    Heart:    Regular rate and rhythm, S1 and S2 normal, no murmur, rub   or gallop     Data Review:   No results found for this or any previous visit (from the past 24 hour(s)). Assessment:     Active Problems:    * No active hospital problems. *  Resolved Problems:    * No resolved hospital problems. *      Plan:     Pt has h/o two prior C-sections in [place], [place]. The first under epidural and the second under SAB. She reports feeling pressure and tugging during the surgeries and is asking to be sedated as soon as the baby is delivered. I explained that those sensations are normal and that we try to avoid giving sedatives prior to delivery. She understands all of this and is willing to have a SAB, recognizing she will feel pressure and tugging, and then receive IV sedatives after delivery if she so desires. She also understands that this may result in amnesia for all or part of her time in the OR. Total time spent on this consult was 25 minutes. Over 50% of the time was spent counseling patient and coordinating care. Physician requesting consult notified of plan.     Signed By: Neal Patrick MD     November 30, 2022

## 2022-12-01 ENCOUNTER — ROUTINE PRENATAL (OUTPATIENT)
Dept: OBGYN CLINIC | Age: 47
End: 2022-12-01

## 2022-12-01 VITALS — WEIGHT: 183 LBS | BODY MASS INDEX: 28.66 KG/M2 | DIASTOLIC BLOOD PRESSURE: 80 MMHG | SYSTOLIC BLOOD PRESSURE: 122 MMHG

## 2022-12-01 DIAGNOSIS — Z36.85 SCREENING, ANTENATAL, FOR STREPTOCOCCUS B: ICD-10-CM

## 2022-12-01 DIAGNOSIS — U07.1 COVID-19 AFFECTING PREGNANCY IN FIRST TRIMESTER: ICD-10-CM

## 2022-12-01 DIAGNOSIS — O09.813 PREGNANCY RESULTING FROM IN VITRO FERTILIZATION IN THIRD TRIMESTER: ICD-10-CM

## 2022-12-01 DIAGNOSIS — O98.713 HIV DISEASE AFFECTING PREGNANCY IN THIRD TRIMESTER: ICD-10-CM

## 2022-12-01 DIAGNOSIS — Z86.39 HISTORY OF THYROID DISEASE: ICD-10-CM

## 2022-12-01 DIAGNOSIS — O09.523 HIGH RISK PREGNANCY, MULTIGRAVIDA OF ADVANCED MATERNAL AGE IN THIRD TRIMESTER: Primary | ICD-10-CM

## 2022-12-01 DIAGNOSIS — O99.013 ANEMIA AFFECTING PREGNANCY IN THIRD TRIMESTER: ICD-10-CM

## 2022-12-01 DIAGNOSIS — Z98.890 HISTORY OF MYOMECTOMY: ICD-10-CM

## 2022-12-01 DIAGNOSIS — O98.511 COVID-19 AFFECTING PREGNANCY IN FIRST TRIMESTER: ICD-10-CM

## 2022-12-01 DIAGNOSIS — O34.219 HISTORY OF CESAREAN SECTION COMPLICATING PREGNANCY: ICD-10-CM

## 2022-12-05 LAB
BACTERIA SPEC CULT: NORMAL
SERVICE CMNT-IMP: NORMAL

## 2022-12-08 ENCOUNTER — ANESTHESIA EVENT (OUTPATIENT)
Dept: OPERATING ROOM | Age: 47
End: 2022-12-08
Payer: OTHER GOVERNMENT

## 2022-12-08 ENCOUNTER — HOSPITAL ENCOUNTER (INPATIENT)
Age: 47
LOS: 3 days | Discharge: HOME | End: 2022-12-11
Attending: OBSTETRICS & GYNECOLOGY | Admitting: OBSTETRICS & GYNECOLOGY
Payer: OTHER GOVERNMENT

## 2022-12-08 ENCOUNTER — ANESTHESIA (OUTPATIENT)
Dept: OPERATING ROOM | Age: 47
End: 2022-12-08
Payer: OTHER GOVERNMENT

## 2022-12-08 PROBLEM — Z34.83 MULTIGRAVIDA IN THIRD TRIMESTER: Status: ACTIVE | Noted: 2022-12-08

## 2022-12-08 LAB
ABO + RH BLD: NORMAL
BLOOD GROUP ANTIBODIES SERPL: NORMAL
ERYTHROCYTE [DISTWIDTH] IN BLOOD BY AUTOMATED COUNT: 13.8 % (ref 11.9–14.6)
HCT VFR BLD AUTO: 36.9 % (ref 35.8–46.3)
HGB BLD-MCNC: 12.6 G/DL (ref 11.7–15.4)
MCH RBC QN AUTO: 32.4 PG (ref 26.1–32.9)
MCHC RBC AUTO-ENTMCNC: 34.1 G/DL (ref 31.4–35)
MCV RBC AUTO: 94.9 FL (ref 82–102)
NRBC # BLD: 0 K/UL (ref 0–0.2)
PLATELET # BLD AUTO: 184 K/UL (ref 150–450)
PMV BLD AUTO: 9.9 FL (ref 9.4–12.3)
RBC # BLD AUTO: 3.89 M/UL (ref 4.05–5.2)
SPECIMEN EXP DATE BLD: NORMAL
WBC # BLD AUTO: 5.1 K/UL (ref 4.3–11.1)

## 2022-12-08 PROCEDURE — 6370000000 HC RX 637 (ALT 250 FOR IP): Performed by: ANESTHESIOLOGY

## 2022-12-08 PROCEDURE — 2580000003 HC RX 258: Performed by: OBSTETRICS & GYNECOLOGY

## 2022-12-08 PROCEDURE — 2500000003 HC RX 250 WO HCPCS: Performed by: ANESTHESIOLOGY

## 2022-12-08 PROCEDURE — 7100000001 HC PACU RECOVERY - ADDTL 15 MIN: Performed by: OBSTETRICS & GYNECOLOGY

## 2022-12-08 PROCEDURE — 2709999900 HC NON-CHARGEABLE SUPPLY: Performed by: OBSTETRICS & GYNECOLOGY

## 2022-12-08 PROCEDURE — 85027 COMPLETE CBC AUTOMATED: CPT

## 2022-12-08 PROCEDURE — 2500000003 HC RX 250 WO HCPCS: Performed by: REGISTERED NURSE

## 2022-12-08 PROCEDURE — 36415 COLL VENOUS BLD VENIPUNCTURE: CPT

## 2022-12-08 PROCEDURE — 2720000010 HC SURG SUPPLY STERILE: Performed by: OBSTETRICS & GYNECOLOGY

## 2022-12-08 PROCEDURE — 51702 INSERT TEMP BLADDER CATH: CPT

## 2022-12-08 PROCEDURE — 86900 BLOOD TYPING SEROLOGIC ABO: CPT

## 2022-12-08 PROCEDURE — 3700000000 HC ANESTHESIA ATTENDED CARE: Performed by: OBSTETRICS & GYNECOLOGY

## 2022-12-08 PROCEDURE — 1100000000 HC RM PRIVATE

## 2022-12-08 PROCEDURE — 2580000003 HC RX 258: Performed by: REGISTERED NURSE

## 2022-12-08 PROCEDURE — 3700000001 HC ADD 15 MINUTES (ANESTHESIA): Performed by: OBSTETRICS & GYNECOLOGY

## 2022-12-08 PROCEDURE — 6360000002 HC RX W HCPCS: Performed by: ANESTHESIOLOGY

## 2022-12-08 PROCEDURE — 3609079900 HC CESAREAN SECTION: Performed by: OBSTETRICS & GYNECOLOGY

## 2022-12-08 PROCEDURE — 59510 CESAREAN DELIVERY: CPT | Performed by: OBSTETRICS & GYNECOLOGY

## 2022-12-08 PROCEDURE — 6360000002 HC RX W HCPCS: Performed by: OBSTETRICS & GYNECOLOGY

## 2022-12-08 PROCEDURE — 6360000002 HC RX W HCPCS: Performed by: REGISTERED NURSE

## 2022-12-08 PROCEDURE — 7100000000 HC PACU RECOVERY - FIRST 15 MIN: Performed by: OBSTETRICS & GYNECOLOGY

## 2022-12-08 RX ORDER — ONDANSETRON 2 MG/ML
4 INJECTION INTRAMUSCULAR; INTRAVENOUS EVERY 6 HOURS PRN
Status: ACTIVE | OUTPATIENT
Start: 2022-12-08 | End: 2022-12-09

## 2022-12-08 RX ORDER — FENTANYL CITRATE 50 UG/ML
INJECTION, SOLUTION INTRAMUSCULAR; INTRAVENOUS
Status: COMPLETED | OUTPATIENT
Start: 2022-12-08 | End: 2022-12-08

## 2022-12-08 RX ORDER — TRISODIUM CITRATE DIHYDRATE AND CITRIC ACID MONOHYDRATE 500; 334 MG/5ML; MG/5ML
30 SOLUTION ORAL ONCE
Status: COMPLETED | OUTPATIENT
Start: 2022-12-08 | End: 2022-12-08

## 2022-12-08 RX ORDER — SODIUM CHLORIDE, SODIUM LACTATE, POTASSIUM CHLORIDE, CALCIUM CHLORIDE 600; 310; 30; 20 MG/100ML; MG/100ML; MG/100ML; MG/100ML
INJECTION, SOLUTION INTRAVENOUS CONTINUOUS
Status: DISCONTINUED | OUTPATIENT
Start: 2022-12-08 | End: 2022-12-09 | Stop reason: SDUPTHER

## 2022-12-08 RX ORDER — SODIUM CHLORIDE 0.9 % (FLUSH) 0.9 %
10 SYRINGE (ML) INJECTION PRN
Status: DISCONTINUED | OUTPATIENT
Start: 2022-12-08 | End: 2022-12-09 | Stop reason: SDUPTHER

## 2022-12-08 RX ORDER — MORPHINE SULFATE 0.5 MG/ML
INJECTION, SOLUTION EPIDURAL; INTRATHECAL; INTRAVENOUS
Status: COMPLETED | OUTPATIENT
Start: 2022-12-08 | End: 2022-12-08

## 2022-12-08 RX ORDER — ONDANSETRON 2 MG/ML
4 INJECTION INTRAMUSCULAR; INTRAVENOUS ONCE
Status: COMPLETED | OUTPATIENT
Start: 2022-12-08 | End: 2022-12-08

## 2022-12-08 RX ORDER — ACETAMINOPHEN 500 MG
1000 TABLET ORAL EVERY 6 HOURS
Status: DISCONTINUED | OUTPATIENT
Start: 2022-12-08 | End: 2022-12-09 | Stop reason: SDUPTHER

## 2022-12-08 RX ORDER — BUPIVACAINE HYDROCHLORIDE 7.5 MG/ML
INJECTION, SOLUTION INTRASPINAL
Status: COMPLETED | OUTPATIENT
Start: 2022-12-08 | End: 2022-12-08

## 2022-12-08 RX ORDER — ONDANSETRON 2 MG/ML
4 INJECTION INTRAMUSCULAR; INTRAVENOUS EVERY 6 HOURS PRN
Status: DISCONTINUED | OUTPATIENT
Start: 2022-12-09 | End: 2022-12-09 | Stop reason: SDUPTHER

## 2022-12-08 RX ORDER — OXYCODONE HYDROCHLORIDE 5 MG/1
5 TABLET ORAL EVERY 4 HOURS PRN
Status: ACTIVE | OUTPATIENT
Start: 2022-12-08 | End: 2022-12-09

## 2022-12-08 RX ORDER — PROCHLORPERAZINE EDISYLATE 5 MG/ML
5 INJECTION INTRAMUSCULAR; INTRAVENOUS EVERY 6 HOURS PRN
Status: DISCONTINUED | OUTPATIENT
Start: 2022-12-08 | End: 2022-12-11 | Stop reason: HOSPADM

## 2022-12-08 RX ORDER — MORPHINE SULFATE 4 MG/ML
4 INJECTION INTRAVENOUS
Status: DISCONTINUED | OUTPATIENT
Start: 2022-12-09 | End: 2022-12-11 | Stop reason: HOSPADM

## 2022-12-08 RX ORDER — KETOROLAC TROMETHAMINE 30 MG/ML
30 INJECTION, SOLUTION INTRAMUSCULAR; INTRAVENOUS EVERY 6 HOURS
Status: COMPLETED | OUTPATIENT
Start: 2022-12-08 | End: 2022-12-09

## 2022-12-08 RX ORDER — SODIUM CHLORIDE 9 MG/ML
INJECTION, SOLUTION INTRAVENOUS PRN
Status: DISCONTINUED | OUTPATIENT
Start: 2022-12-08 | End: 2022-12-09 | Stop reason: SDUPTHER

## 2022-12-08 RX ORDER — NALOXONE HYDROCHLORIDE 0.4 MG/ML
INJECTION, SOLUTION INTRAMUSCULAR; INTRAVENOUS; SUBCUTANEOUS PRN
Status: ACTIVE | OUTPATIENT
Start: 2022-12-08 | End: 2022-12-09

## 2022-12-08 RX ORDER — POLYETHYLENE GLYCOL 3350 17 G/17G
17 POWDER, FOR SOLUTION ORAL DAILY PRN
Status: DISCONTINUED | OUTPATIENT
Start: 2022-12-09 | End: 2022-12-11 | Stop reason: HOSPADM

## 2022-12-08 RX ORDER — SODIUM CHLORIDE 0.9 % (FLUSH) 0.9 %
5-40 SYRINGE (ML) INJECTION EVERY 12 HOURS SCHEDULED
Status: DISCONTINUED | OUTPATIENT
Start: 2022-12-08 | End: 2022-12-08 | Stop reason: HOSPADM

## 2022-12-08 RX ORDER — MORPHINE SULFATE 4 MG/ML
4 INJECTION INTRAVENOUS
Status: DISCONTINUED | OUTPATIENT
Start: 2022-12-08 | End: 2022-12-08

## 2022-12-08 RX ORDER — SODIUM CHLORIDE 0.9 % (FLUSH) 0.9 %
10 SYRINGE (ML) INJECTION EVERY 12 HOURS SCHEDULED
Status: DISCONTINUED | OUTPATIENT
Start: 2022-12-08 | End: 2022-12-09 | Stop reason: SDUPTHER

## 2022-12-08 RX ORDER — SODIUM CHLORIDE 9 MG/ML
INJECTION, SOLUTION INTRAVENOUS PRN
Status: DISCONTINUED | OUTPATIENT
Start: 2022-12-08 | End: 2022-12-08 | Stop reason: HOSPADM

## 2022-12-08 RX ORDER — NALBUPHINE HCL 10 MG/ML
5 AMPUL (ML) INJECTION EVERY 4 HOURS PRN
Status: DISPENSED | OUTPATIENT
Start: 2022-12-08 | End: 2022-12-09

## 2022-12-08 RX ORDER — FENTANYL CITRATE 50 UG/ML
INJECTION, SOLUTION INTRAMUSCULAR; INTRAVENOUS PRN
Status: DISCONTINUED | OUTPATIENT
Start: 2022-12-08 | End: 2022-12-08 | Stop reason: SDUPTHER

## 2022-12-08 RX ORDER — KETOROLAC TROMETHAMINE 30 MG/ML
INJECTION, SOLUTION INTRAMUSCULAR; INTRAVENOUS PRN
Status: DISCONTINUED | OUTPATIENT
Start: 2022-12-08 | End: 2022-12-08 | Stop reason: SDUPTHER

## 2022-12-08 RX ORDER — SODIUM CHLORIDE, SODIUM LACTATE, POTASSIUM CHLORIDE, CALCIUM CHLORIDE 600; 310; 30; 20 MG/100ML; MG/100ML; MG/100ML; MG/100ML
INJECTION, SOLUTION INTRAVENOUS CONTINUOUS PRN
Status: DISCONTINUED | OUTPATIENT
Start: 2022-12-08 | End: 2022-12-08 | Stop reason: SDUPTHER

## 2022-12-08 RX ORDER — SODIUM CHLORIDE 0.9 % (FLUSH) 0.9 %
5-40 SYRINGE (ML) INJECTION PRN
Status: DISCONTINUED | OUTPATIENT
Start: 2022-12-08 | End: 2022-12-08 | Stop reason: HOSPADM

## 2022-12-08 RX ORDER — SODIUM CHLORIDE, SODIUM LACTATE, POTASSIUM CHLORIDE, AND CALCIUM CHLORIDE .6; .31; .03; .02 G/100ML; G/100ML; G/100ML; G/100ML
1000 INJECTION, SOLUTION INTRAVENOUS ONCE
Status: COMPLETED | OUTPATIENT
Start: 2022-12-08 | End: 2022-12-08

## 2022-12-08 RX ORDER — ONDANSETRON 2 MG/ML
4 INJECTION INTRAMUSCULAR; INTRAVENOUS EVERY 6 HOURS PRN
Status: DISCONTINUED | OUTPATIENT
Start: 2022-12-08 | End: 2022-12-08

## 2022-12-08 RX ORDER — HYDROMORPHONE HYDROCHLORIDE 1 MG/ML
0.5 INJECTION, SOLUTION INTRAMUSCULAR; INTRAVENOUS; SUBCUTANEOUS EVERY 6 HOURS PRN
Status: ACTIVE | OUTPATIENT
Start: 2022-12-08 | End: 2022-12-09

## 2022-12-08 RX ADMIN — SODIUM CHLORIDE, PRESERVATIVE FREE 10 ML: 5 INJECTION INTRAVENOUS at 17:22

## 2022-12-08 RX ADMIN — ONDANSETRON 4 MG: 2 INJECTION INTRAMUSCULAR; INTRAVENOUS at 10:03

## 2022-12-08 RX ADMIN — PHENYLEPHRINE HYDROCHLORIDE 100 MCG: 0.1 INJECTION, SOLUTION INTRAVENOUS at 11:18

## 2022-12-08 RX ADMIN — KETOROLAC TROMETHAMINE 30 MG: 30 INJECTION, SOLUTION INTRAMUSCULAR; INTRAVENOUS at 11:33

## 2022-12-08 RX ADMIN — SODIUM CHLORIDE, SODIUM LACTATE, POTASSIUM CHLORIDE, AND CALCIUM CHLORIDE: 600; 310; 30; 20 INJECTION, SOLUTION INTRAVENOUS at 10:40

## 2022-12-08 RX ADMIN — Medication 500 ML/HR: at 11:09

## 2022-12-08 RX ADMIN — SODIUM CHLORIDE, POTASSIUM CHLORIDE, SODIUM LACTATE AND CALCIUM CHLORIDE: 600; 310; 30; 20 INJECTION, SOLUTION INTRAVENOUS at 21:31

## 2022-12-08 RX ADMIN — ACETAMINOPHEN 1000 MG: 500 TABLET, FILM COATED ORAL at 20:38

## 2022-12-08 RX ADMIN — NALBUPHINE HYDROCHLORIDE 5 MG: 10 INJECTION, SOLUTION INTRAMUSCULAR; INTRAVENOUS; SUBCUTANEOUS at 21:45

## 2022-12-08 RX ADMIN — PHENYLEPHRINE HYDROCHLORIDE 100 MCG: 0.1 INJECTION, SOLUTION INTRAVENOUS at 10:57

## 2022-12-08 RX ADMIN — FENTANYL CITRATE 20 MCG: 50 INJECTION, SOLUTION INTRAMUSCULAR; INTRAVENOUS at 10:47

## 2022-12-08 RX ADMIN — SODIUM CHLORIDE, PRESERVATIVE FREE 10 ML: 5 INJECTION INTRAVENOUS at 17:49

## 2022-12-08 RX ADMIN — BUPIVACAINE HYDROCHLORIDE IN DEXTROSE 12 MG: 7.5 INJECTION, SOLUTION SUBARACHNOID at 10:47

## 2022-12-08 RX ADMIN — SODIUM CHLORIDE, PRESERVATIVE FREE 10 ML: 5 INJECTION INTRAVENOUS at 21:46

## 2022-12-08 RX ADMIN — PHENYLEPHRINE HYDROCHLORIDE 100 MCG: 0.1 INJECTION, SOLUTION INTRAVENOUS at 10:55

## 2022-12-08 RX ADMIN — KETOROLAC TROMETHAMINE 30 MG: 30 INJECTION, SOLUTION INTRAMUSCULAR; INTRAVENOUS at 23:46

## 2022-12-08 RX ADMIN — TRISODIUM CITRATE DIHYDRATE AND CITRIC ACID MONOHYDRATE 30 ML: 500; 334 SOLUTION ORAL at 09:08

## 2022-12-08 RX ADMIN — NALBUPHINE HYDROCHLORIDE 5 MG: 10 INJECTION, SOLUTION INTRAMUSCULAR; INTRAVENOUS; SUBCUTANEOUS at 13:39

## 2022-12-08 RX ADMIN — SODIUM CHLORIDE, POTASSIUM CHLORIDE, SODIUM LACTATE AND CALCIUM CHLORIDE: 600; 310; 30; 20 INJECTION, SOLUTION INTRAVENOUS at 20:45

## 2022-12-08 RX ADMIN — KETOROLAC TROMETHAMINE 30 MG: 30 INJECTION, SOLUTION INTRAMUSCULAR; INTRAVENOUS at 17:22

## 2022-12-08 RX ADMIN — ACETAMINOPHEN 1000 MG: 500 TABLET, FILM COATED ORAL at 13:39

## 2022-12-08 RX ADMIN — PHENYLEPHRINE HYDROCHLORIDE 100 MCG: 0.1 INJECTION, SOLUTION INTRAVENOUS at 10:53

## 2022-12-08 RX ADMIN — FENTANYL CITRATE 30 MCG: 50 INJECTION, SOLUTION INTRAMUSCULAR; INTRAVENOUS at 11:11

## 2022-12-08 RX ADMIN — SODIUM CHLORIDE, POTASSIUM CHLORIDE, SODIUM LACTATE AND CALCIUM CHLORIDE 1000 ML: 600; 310; 30; 20 INJECTION, SOLUTION INTRAVENOUS at 08:00

## 2022-12-08 RX ADMIN — CEFAZOLIN 2000 MG: 10 INJECTION, POWDER, FOR SOLUTION INTRAVENOUS at 10:36

## 2022-12-08 RX ADMIN — MORPHINE SULFATE 0.15 MG: 0.5 INJECTION, SOLUTION EPIDURAL; INTRATHECAL; INTRAVENOUS at 10:47

## 2022-12-08 RX ADMIN — PHENYLEPHRINE HYDROCHLORIDE 100 MCG: 0.1 INJECTION, SOLUTION INTRAVENOUS at 10:58

## 2022-12-08 RX ADMIN — PHENYLEPHRINE HYDROCHLORIDE 100 MCG: 0.1 INJECTION, SOLUTION INTRAVENOUS at 11:14

## 2022-12-08 RX ADMIN — NALBUPHINE HYDROCHLORIDE 5 MG: 10 INJECTION, SOLUTION INTRAMUSCULAR; INTRAVENOUS; SUBCUTANEOUS at 17:48

## 2022-12-08 ASSESSMENT — PAIN SCALES - GENERAL
PAINLEVEL_OUTOF10: 3
PAINLEVEL_OUTOF10: 0
PAINLEVEL_OUTOF10: 2

## 2022-12-08 ASSESSMENT — PAIN DESCRIPTION - ORIENTATION
ORIENTATION: ANTERIOR;LOWER
ORIENTATION: ANTERIOR;LOWER

## 2022-12-08 ASSESSMENT — PAIN DESCRIPTION - LOCATION
LOCATION: ABDOMEN;INCISION
LOCATION: ABDOMEN;INCISION

## 2022-12-08 ASSESSMENT — PAIN DESCRIPTION - DESCRIPTORS
DESCRIPTORS: SORE
DESCRIPTORS: SORE

## 2022-12-08 NOTE — L&D DELIVERY NOTE
Mother's Information      Labor Events     Labor?: No  Cervical Ripening:   Now               Letha Putnam [[id number]]      Labor Events     Labor?: No   Steroids?: None  Cervical Ripening Date/Time:     Antibiotics Received during Labor?: No  Rupture Identifier: Sac 1   Rupture Date/Time: 22    Rupture Type: AROM  Fluid Color: Meconium  Meconium Consistency: Thin  Fluid Odor: None  Fluid Volume: Moderate       Anesthesia    Method: Spinal       Start Pushing      Labor onset date/time:   Now     Dilation complete date/time:   Now     Start pushing date/time:    Decision date/time (emergent ):           Delivery ([place])      Delivery Date/Time:  22 11:08:55   Delivery Type: , Low Transverse    Details:  Trial of Labor?: No    Categorization: Repeat    Priority: Scheduled   Indications for : Prior Uterine Surgery              Presentation    Presentation: Vertex       Shoulder Dystocia    Shoulder Dystocia Present?: No  Add Second Maneuver  Add Third Maneuver  Add Fourth Maneuver  Add Fifth Maneuver  Add Sixth Maneuver  Add Seventh Maneuver  Add Eighth Maneuver  Add Ninth Maneuver       Assisted Delivery Details    Forceps Attempted?: No  Vacuum Extractor Attempted?: No       Document Additional Attempt         Document Additional Attempt                 Cord    Vessels: 3 Vessels  Complications: None  Cord Blood Disposition: Lab  Gases Sent?: Yes       Placenta    Date/Time: 2022 11:10:00  Removal: Manual Removal  Appearance: Intact  Disposition: Discarded       Lacerations           Vaginal Counts        Sponges Needles Instruments   Initial Counts      Final Counts      If the count is incorrect due to Intentionally Retained Foreign Object (IRFO) add the IRFO LDA in Lines/Drains.   Add LDA: Link to [facility]       Blood Loss  Mother: Bonnie Cavazos #[id number]     Start of Mother's Information Delivery Blood Loss  22 1040 - 22 1132      None                 End of Mother's Information  Mother: Debby Das #[id number]                Delivery Providers    Delivering clinician: Chiquis Burleson MD     Provider Role    Koffi Alanis MD Obstetrician    Augusto Delacruz, RN Primary Nurse    Vijaya Varela, RN Primary  Nurse    Katelyn Bond MD Neonatologist    Liam Vergara MD Anesthesiologist    Elizabeth Koehler, APRN - CRNA Nurse Anesthetist    [address] [place] [address]    Chriss Beckwith DEEPA Respiratory Therapist (Day)              [place] Assessment    Living Status: Living     Apgar Scoring Key:    0 1 2    Skin Color: Blue or pale Acrocyanotic Completely pink    Heart Rate: Absent <100 bpm >100 bpm    Reflex Irritability: No response Grimace Cry or active withdrawal    Muscle Tone: Limp Some flexion Active motion    Respiratory Effort: Absent Weak cry; hypoventilation Good, crying                      Skin Color:   Heart Rate:   Reflex Irritability:   Muscle Tone:   Respiratory Effort:    Total:            1 Minute:    0    2    2    2    2    8        Apgar 1 total from OB History    5 Minute:    1    2    2    2    2    9        Apgar 5 total from OB History    10 Minute:              15 Minute:              20 Minute:                        Apgars Assigned By: DR ARCOS              Resuscitation    Method: Bulb Suction, Stimulation              Measurements      Birth Weight: 3760 g Birth Length: 0.515 m     Head Circumference: 0.345 m Chest Circumference: 0.335 m              Title      Skin to Skin Initiation Date/Time:       Skin to Skin End Date/Time:

## 2022-12-08 NOTE — ANESTHESIA PROCEDURE NOTES
Spinal Block    Patient location during procedure: OR  End time: 12/8/2022 10:52 AM  Reason for block: primary anesthetic  Staffing  Performed: anesthesiologist   Anesthesiologist: Karle Goodell, MD  Spinal Block  Patient position: sitting  Prep: ChloraPrep  Patient monitoring: cardiac monitor, continuous pulse ox and frequent blood pressure checks  Approach: midline  Location: L3/L4  Provider prep: sterile gloves and mask  Local infiltration: lidocaine  Needle  Needle type: pencil-tip   Needle gauge: 25 G  Needle length: 3.5 in  Assessment  Events: cerebrospinal fluid  Swirl obtained: Yes  CSF: clear  Attempts: 1  Hemodynamics: stable  Preanesthetic Checklist  Completed: patient identified, IV checked, risks and benefits discussed, surgical/procedural consents, equipment checked, pre-op evaluation, timeout performed, anesthesia consent given, oxygen available and monitors applied/VS acknowledged

## 2022-12-08 NOTE — PROGRESS NOTES
Drainage noted on dressing. Dr Genaro Polo notified. Orders to replace dressing. Dressing replaced. No active bleeding noted.

## 2022-12-08 NOTE — PROGRESS NOTES
I have examined and spoken with the patient this morning. She has no new medical issues or complaints. She reports no new medicines since H&P. She again understands procedure, risks/benefits and agrees to proceed.   Mohinder Rosario MD  10:13 AM  12/08/22

## 2022-12-08 NOTE — PROGRESS NOTES
Dr. Juan Cao at nurses station per MD pt to continue taking her own   Epizicom 600/300mg qd  Prezista 800mg qd  Norvir 100mg qd. Orders read back and verified.

## 2022-12-08 NOTE — PROGRESS NOTES
Admission assessment completed see flowsheet. Discussed today plan of care with mother. Bleeding precautions given. Pt instructed to call before getting OOB. No s/s of distress noted. Pt encouraged to increased her fluid intake as tolerated. Pt states she takes her Ezpizon, Prezista, and Norvir QHS and has her medications with her. Pt instructed to continue the above medications per Dr. Eleazar Dee. Questions encouraged and answered. Pt to call with needs/concerns. Pt in bed with call light in reach. Water pitcher within reach, saltine crackers given to pt as well.

## 2022-12-08 NOTE — ANESTHESIA PRE PROCEDURE
Department of Anesthesiology  Preprocedure Note       Name:  Vin Sanders   Age:  41 y.o.  :  1975                                          MRN:  [id number]         Date:  2022      Surgeon: Bartolo Alfonso):  Lorice Cheadle, MD    Procedure:     Medications prior to admission:   Prior to Admission medications    Medication Sig Start Date End Date Taking? Authorizing Provider   famotidine (PEPCID) 20 MG tablet Take 1 tablet by mouth nightly as needed (reflux) 22   Kisha Crawford DO   aspirin EC 81 MG EC tablet Take 2 tablets by mouth in the morning. Patient not taking: Reported on 2022 8/3/22   Kisha Crawford DO   Prenatal Vit-Fe Fumarate-FA (PRENATAL VITAMINS PO) prenatal vit 10-iron-folic-dha   1 tab daily    Historical Provider, MD   abacavir-lamiVUDine (EPZICOM) 600-300 MG per tablet Take 1 tablet by mouth daily    Ar Automatic Reconciliation   darunavir (PREZISTA) 800 MG TABS tablet Take by mouth    Ar Automatic Reconciliation   ritonavir (NORVIR) 100 MG tablet Take 100 mg by mouth daily     Ar Automatic Reconciliation       Current medications:    No current facility-administered medications for this visit. No current outpatient medications on file.      Facility-Administered Medications Ordered in Other Visits   Medication Dose Route Frequency Provider Last Rate Last Admin    lactated ringers infusion   IntraVENous Continuous Koffi Vallejo MD        lactated ringers bolus  1,000 mL IntraVENous Once Lorice Cheadle, MD        sodium chloride flush 0.9 % injection 10 mL  10 mL IntraVENous 2 times per day Lorice Cheadle, MD        sodium chloride flush 0.9 % injection 10 mL  10 mL IntraVENous PRN Lorice Cheadle, MD        0.9 % sodium chloride infusion   IntraVENous PRN Lorice Cheadle, MD        ondansetron [facility]) injection 4 mg  4 mg IntraVENous Q6H PRN Lorice Cheadle, MD        ceFAZolin (ANCEF) 2000 mg in sterile water 20 mL IV syringe  2,000 mg IntraVENous Once Koffi Snyder MD        ondansetron [facility]) injection 4 mg  4 mg IntraVENous Once Karle Goodell, MD        sodium chloride flush 0.9 % injection 5-40 mL  5-40 mL IntraVENous 2 times per day Karle Goodell, MD        sodium chloride flush 0.9 % injection 5-40 mL  5-40 mL IntraVENous PRN Karle Goodell, MD        0.9 % sodium chloride infusion   IntraVENous PRN Karle Goodell, MD           Allergies:  No Known Allergies    Problem List:    Patient Active Problem List   Diagnosis Code    HIV disease affecting pregnancy in third trimester O80.80    High risk pregnancy, multigravida of advanced maternal age in third trimester O09.523    History of  section complicating pregnancy O14.872    History of myomectomy Z98.890    Anemia affecting pregnancy in third trimester O99.013    History of thyroid disease Z80.42    Pregnancy resulting from in vitro fertilization in third trimester O09.813    Anemia, iron deficiency D50.9    Cortical cataract of both eyes H26.9    Uterine fibroid in pregnancy O34.10, D25.9    Heart block AV first degree I44.0    Heart murmur R01.1    Hyperopia of both eyes H52.03    COVID-19 affecting pregnancy in first trimester O98.511, U07.1    Other forms of systemic lupus erythematosus ([facility] [address] M32.8    Multigravida in third trimester Z34.83       Past Medical History:        Diagnosis Date    Abnormal Pap smear of cervix     Acute left-sided low back pain without sciatica 2021    Onset  at least 2 weeks ago. Reports intermittent pain that can last for at least 3 hours. Denies any trauma or injury. No leg weakness or swelling. She tried taking Motrin to help with the pain. No abdominal pain or nausea. Noticed the pain occurred after being intimate  with her spouse. Will order a urinalysis and follow up pending results.  Prescribe a trial of Flexeril 10 mg at bedtime and Volt    Anemia     prescribed iron to take daily     Anemia, iron deficiency 2014    Formatting of this note might be different from the original.  - noted to be Fe deficient w/ anemia, reporting menorrhagia 2014 fe sat = 6% hgb = 8.9 2014 h/h = - resolved  History: has had difficulty with anemia 2nd to heavy menses. Is not taking Fe at this time. [] chronic, untreated -- will discuss need for Fe at next visit -- check Fe panel at next visit    Autoimmune disorder ([facility] [address]     HIV disease ([facility] [address]     Hyperthyroidism     No meds for several years    Infection due to yeast 2019    Complains of a vaginal discharge and feeling like there is a clump of something in her vagina. She does have some itching. She relates she has a discharge frequently after she has intercourse with her  whether he uses a condom or not. We treated her for BV in February of this year and her symptoms resolved at that time. On exam she does have a thick white discharge in the vault and wet     Systemic lupus erythematosus ([facility] [address]        Past Surgical History:        Procedure Laterality Date     SECTION      x 2     LAP,TUBAL CAUTERY      MYOMECTOMY      laparoscopic    OTHER SURGICAL HISTORY      tubal reversal     WISDOM TOOTH EXTRACTION         Social History:    Social History     Tobacco Use    Smoking status: Never    Smokeless tobacco: Never   Substance Use Topics    Alcohol use: Not Currently                                Counseling given: Not Answered      Vital Signs (Current): There were no vitals filed for this visit.                                            BP Readings from Last 3 Encounters:   22 125/69   22 122/80   22 121/72       NPO Status:                                                                                 BMI:   Wt Readings from Last 3 Encounters:   22 183 lb (83 kg)   11/15/22 181 lb (82.1 kg)   22 180 lb (81.6 kg)     There is no height or weight on file to calculate BMI.    CBC:   Lab Results   Component Value Date/Time    WBC 5.1 12/08/2022 08:01 AM    RBC 3.89 12/08/2022 08:01 AM    HGB 12.6 12/08/2022 08:01 AM    HCT 36.9 12/08/2022 08:01 AM    MCV 94.9 12/08/2022 08:01 AM    RDW 13.8 12/08/2022 08:01 AM     12/08/2022 08:01 AM       CMP:   Lab Results   Component Value Date/Time     11/15/2022 11:06 AM    K 3.7 11/15/2022 11:06 AM     11/15/2022 11:06 AM    CO2 22 11/15/2022 11:06 AM    BUN 7 11/15/2022 11:06 AM    CREATININE 0.80 11/15/2022 11:06 AM    GFRAA >60 09/16/2022 11:44 AM    AGRATIO 1.2 03/17/2021 02:52 PM    LABGLOM >60 11/15/2022 11:06 AM    GLUCOSE 100 11/15/2022 11:06 AM    PROT 7.0 11/15/2022 11:06 AM    CALCIUM 9.3 11/15/2022 11:06 AM    BILITOT 0.5 11/15/2022 11:06 AM    ALKPHOS 143 11/15/2022 11:06 AM    ALKPHOS 67 03/17/2021 02:52 PM    AST 29 11/15/2022 11:06 AM    ALT 34 11/15/2022 11:06 AM       POC Tests: No results for input(s): POCGLU, POCNA, POCK, POCCL, POCBUN, POCHEMO, POCHCT in the last 72 hours.     Coags: No results found for: PROTIME, INR, APTT    HCG (If Applicable): No results found for: PREGTESTUR, PREGSERUM, HCG, HCGQUANT     ABGs: No results found for: PHART, PO2ART, YHK6DYE, TZW7CHZ, BEART, [id number]     Type & Screen (If Applicable):  No results found for: LABABO, LABRH    Drug/Infectious Status (If Applicable):  Lab Results   Component Value Date/Time    HEPCAB NONREACTIVE 10/17/2022 04:03 PM       COVID-19 Screening (If Applicable):   Lab Results   Component Value Date/Time    COVID19 Detected 06/09/2022 10:45 AM           Anesthesia Evaluation  Patient summary reviewed and Nursing notes reviewed no history of anesthetic complications:   Airway: Mallampati: II  TM distance: >3 FB   Neck ROM: full  Mouth opening: > = 3 FB   Dental: normal exam         Pulmonary:Negative Pulmonary ROS and normal exam  breath sounds clear to auscultation                             Cardiovascular:Negative CV ROS  Exercise tolerance: good (>4 METS),   (+) valvular problems/murmurs:,         Rhythm: regular  Rate: normal                 ROS comment: Pt denies h/o first degree AV block (stated in chart). States only heart issue was a murmur detected at delivery of first child 16 yrs ago. Excellent exercise tolerance, pt asymptomatic, no murmur appreciated today. Neuro/Psych:   Negative Neuro/Psych ROS              GI/Hepatic/Renal: Neg GI/Hepatic/Renal ROS            Endo/Other:    (+) hyperthyroidism (h/o, no longer medicated, euthyroid), blood dyscrasia: anemia:., .                  ROS comment: HIV (treated, undetectable viral load)  SLE (uncertain diagnosis per pt.) Abdominal:             Vascular: negative vascular ROS. Other Findings:             Anesthesia Plan      spinal     ASA 2     (Pt has h/o two prior C-sections in [place], [place]. The first under epidural and the second under SAB. She reports feeling pressure and tugging during the surgeries and is asking to be sedated as soon as the baby is delivered. I explained that those sensations are normal and that we try to avoid giving sedatives prior to delivery. She understands all of this and is willing to have a SAB, recognizing she will feel pressure and tugging, and then receive IV sedatives after delivery if she so desires. She also understands that this may result in amnesia for all or part of her time in the OR.)        Anesthetic plan and risks discussed with patient and spouse. Plan discussed with CRNA.           Post-op pain plan if not by surgeon: intrathecal narcotics            Joellen Boggs MD   12/8/2022

## 2022-12-08 NOTE — PROGRESS NOTES
Pt admitted to  424 for scheduled C/S. IV started and labs sent. Consents witnessed.     1045- FHR prior to spinal 155  FHR after spinal 145

## 2022-12-08 NOTE — OP NOTE
[facility]  [address], [address], [address]  [phone]    Megan Floyd MD, [facility]  Amada Hylton MD, FACOG    Indy Grossman  1975    Preop Dx:   1. IUP @ 38 weeks gestation   2. Previous C/S x 2 - repeat  3. HIV infection in pregnancy    Postop Dx: Same    Procedure: repeat LTCS    Surgeon: Josie Lopez      Anesthesia: spinal    EBL: 500 mL  IVF: 800 mL  UOP: 50 mL    Complications: None    Findings:  Viable female infant. Vtx position. APGARS 8,9.  Weight:  8 lbs, 5 oz. Normal appearing uterus, tubes and ovaries. Procedure:  Pt was taken to the operating room where spinal anesthesia was found to be adequate. She was then prepped and draped in the usual sterile fashion and placed in the supine position with a leftward tilt. A Pfannenstiel skin incision was made with the scalpel and carried down to the underlying layer of fascia with the bovie. The fascia was incised in the midline and the incision extended laterally with the judd scissors. Superior of the fascial incision was grasped with Kocher clamps and  from the rectus muscles sharply and bluntly. In a similar fashion, the inferior aspect of the fascial incision was grasped with the Kocher clamps and  from the rectus muscles sharply and bluntly. The rectus muscles were  in the midline bluntly and peritoneum entered bluntly. The peritoneal incision was extended manually. Bladder blade was inserted and lower uterine incision made with the scalpel. Incision extended manually laterally. Infants head delivered atraumatically. Nose and mouth suctioned. Cord clamped and cut. Infant handed off to the waiting NICU staff. The uterus was exteriorized and cleared of all clots and debris. Hysterotomy was closed with 0-vicryl in a running, locking fashion. A second layer of 0-vicryl was placed in a interrupted figure of 8 fashion to imbricate the incision.   The pelvis and gutters were cleared of all clots and debris, the uterus returned to the abdomen and hemostasis was assured throughout. Intercede was placed over the hysterotomy site. Fascial incision repaired with 0-PDS in a running fashion. Subcutaneous tissue was cleared of all clots and debris and hemostasis assured. Subcutaneous tissue reapproximated with 3-0 vicryl rapide in a running fashion. Skin closed with Insorb in a subcuticular fashion. Pt tolerated procedure well. Sponge, lap and needle counts correct x 3. Disposition: Pt to RR stable and infant to  nursery stable.     Pathology: none      Tarah Jimenez MD  11:32 AM  22

## 2022-12-08 NOTE — H&P
[address], [place] [address], [address]  [phone]    Viktoria Dinero MD, Alhaji Vyas [facility]  Mninie Lopez MD, FACOG      [facility] H&P      Assessment/Plan    Patient Active Problem List    Diagnosis Date Noted    Multigravida in third trimester 2022     Priority: Medium    Other forms of systemic lupus erythematosus ([address] 2022     Priority: Medium     Overview Note:     22 Hematology: La Ab is increased at 4.9. MFM aware. Will recheck fetal HR at [place] and advise on further need for  testing   10/21/2022 FM: no heart block noted and unlikely to develop at this gestational age. COVID-19 affecting pregnancy in first trimester 2022     Priority: Medium     Overview Note:     22 at [facility]:  Testing positive for Covid19 on . Symptoms have been mild. Headaches, fevers now just feeling congested, no SOB. Vital signs 127/88, P82, Pulse Ox 100%. 22 at [facility]:  Denies symptoms  No further testing needed        Cortical cataract of both eyes 2022     Priority: Medium    Hyperopia of both eyes 2021     Priority: Medium    Heart murmur 2018     Priority: Medium     Overview Note:     22 at [facility]:  Patient reports being diagnosed after leaving hospital in  after having her son. Has had no issues and does not see a cardiologist         Uterine fibroid in pregnancy 2015     Priority: Medium     Overview Note:       22 at [facility]: several small fibroids seen on ultrasound today       HIV disease affecting pregnancy in third trimester 2014     Priority: Medium     Overview Note:     22 at [facility]: Patient was diagnosed with HIV in . Managed with [employer] every 6 months. Next appointment is in July. Last appointment viral load per patient was undetectable. Taking Epzicom, Prezista, and Norvir daily. Would not change any treatment, watch for FGR with serial US. 2022 [facility]: labs reviewed. VL undetectable , reassuring CD4 count. Continues to do well with HAART. Reviewed delivery medication plans- remain on current dose;  will need AZT  22 at [facility]: stable on medications. Needs VL at 35-36 weeks prior to c/s.    22 at [facility]: Will have pt go to lab to get HIV VL checked. 2022 at [facility]: VL Undetectable. See HR Overview for delivery plan. Patient to take her current med every night, night before and night after C/S. Heart block AV first degree 2014     Priority: Medium     Overview Note:     - 1st degree heart block on Rey[place]z, epzicom; improved off reyataz; ECHO = normal                Anemia, iron deficiency 2014     Priority: Medium     Overview Note:      - noted to be Fe deficient w/ anemia, reporting menorrhagia  2014 fe sat = 6% hgb = 8.9  2014 h/h = 13/41- resolved    History: has had difficulty with anemia 2nd to heavy menses. Is not taking Fe at this time. [] chronic, untreated  -- will discuss need for Fe at next visit  -- check Fe panel at next visit    22 at [facility]: Hgb 12.3 on 10/17. Received iron infusion over the summer and has since stopped taking po iron supplement. High risk pregnancy, multigravida of advanced maternal age in third trimester 2022     Overview Note:     Conceived via IVF with donor age [facility])  Plans for first screen with MFM  Asa 162 mg qhs   22 at [facility]: Normal NT 1.4 mm and nasal bone. IVF Donor Egg from 21year old, low risk of aneuploidy. Genetic counseling done by MD.  Declines Genetic Testing. Patient with multiple Medical Conditions that complicate this pregnancy. See each individual Diagnosis Overview for details. Talked with patient about each diagnosis and plan. Patient has excellent knowledge of her conditions. She agrees with POC.   Summary of Current Plan  1) History of Myomectomy-Hysteroscopic removal. Multiple small fibroids noted. Increase vascularity see at lower segment. Will follow for PAS with each US. 2) HIV Positive, long standing condition- On HIIT Therapy, last Quant undetectable. Defer Management to ID at [facility]. Will let Neonatologists know prior to delivery. 3) Anemia-Severe Anemia, not tolerating po Fe, N/V. Needs IV Fe in 2nd trimester. 4) Covid-Recent infection, improving, no SOB. 5) Advanced AMA-Donor egg of 21year old, very low risk of Aneuploidy with Nl NT/NB. Very High Risk of PreE, taking 162 mgs ASA. Multiple MFM Visits  22 at [facility]: Excessive fetal growth; AC 90%, Overall 84%, CHRIS 18 cm, Dopplers WNL, BPP 8. Stressed importance of low carb diet since delivering at 38 weeks to promote lung maturity. Extensive diet teaching done. 11/15 HIV Viral Load undetectable. 2022 at [facility]-Reassuring fetal status, BPP/. Extensive diet teaching done. 11/15 HIV Viral Load undetectable. Discussed with patient and , recommend repeat C/S at 38 weeks due to very AMA. Patient told to continue current HIV meds, she takes every evening. To take night before surgery, and night after am surgery. Does not need any other medications. Notify NICU regarding HIV status on day of delivery. Once weekly fetal testing at [facility], office, OB office notified. Repeat C/S at 38 weeks due to multiple morbidities and high risk conditions in this pregnancy. 2022 [facility] Delivery Plan Note  Per Dr. Oniel Castillo, Neonatologist-  \"No need for her to come in prior to delivery. If possible, I'd like us to be able to chat with her about the plan for baby on the day she arrives before her CS, but if not we can do it during the section. I've notified pharmacy to make sure we have baby's meds available (we do). \"        History of  section complicating pregnancy      Overview Note:     Hx of C/S x 2 at outside facility   1 Arrest  2 Elective repeat ()      Failed spinal x 2 and required general anesthesia for both of her prior      History of myomectomy 06/08/2022     Overview Note:     c myomectomy at outside facility in 2010 in [place], [place]  Had term repeat C/S following surgery so assuming not in contractile portion         History of thyroid disease 06/08/2022     Overview Note:     Hx of autoimmune thyroid dz. She is unsure the cause of her hyperthyroidism. She was treated with PTU in the past.  This was diagnosed many years ago. She will get labs done at [facility]. Apparently euthyroid for the past several years. Has not been on meds for years. TSH normal on IPV labs     06/16/22 at [facility]: has not been on meds for years. Labs 6/6 Free T4 0.9, TSH 0.477      Anemia affecting pregnancy in third trimester 03/28/2018     Overview Note:     Severe anemia noted on IPV   Hg 9.0, microcytic   Iron panel/electrophoresis today and start iron BID. Referral to heme/onc for evaluation of IV iron     2009 - noted to be Fe deficient w/ anemia, reporting menorrhagia  2014-02 fe sat = 6% hgb = 8.9  2014-05 h/h = 13/41- resolved    History: has had difficulty with anemia 2nd to heavy menses. Is not taking Fe at this time. [] chronic, untreated  -- will discuss need for Fe at next visit  -- check Fe panel at next visit    06/16/22 at [facility]: HGB on 6/6 9.0. Was started on iron BID.    7/07 unable to tolerate PO iron. Vomits with each use.   08/11/22 at [facility]: Scheduled for injectafer injections with Hematology on 8/17/22 9/16/22 s/p IV iron with Hg 11.6                Pregnancy resulting from in vitro fertilization in third trimester 01/14/2016     Overview Note:     Donor egg. Embryos were not genetically tested   Asa 162 qhs   Planning first screen with MFM  06/16/22 at [facility]: Denies genetic testing today. Patient reassured by ultrasound today   9/9/2022 [facility]: reassuring fetal growth and followup anatomy          Subjective    Indy Jung 41 y.o. [id number] 38w0d  presented to L&D for repeat C/S.  Pt has no complaints today. Pt denies vaginal bleeding/discharge. No LOF.  +FM. OB History    Para Term  AB Living   4 2 2   1 2   SAB IAB Ectopic Molar Multiple Live Births   1         2      # Outcome Date GA Lbr Luis E/2nd Weight Sex Delivery Anes PTL Lv   4 Current            3 2019 4w0d             Birth Comments: IVF pregnancy after transferring three D3 embryos   2 Term 16 38w0d  7 lb 11.6 oz (3.504 kg) F CS-LTranv Spinal N RAFY   1 Term 06 39w0d  7 lb 7 oz (3.374 kg) M CS-Unspec Spinal N RAFY      Birth Comments: Arrest of dilation @ 9cm        Past Medical History:   Diagnosis Date    Abnormal Pap smear of cervix     Acute left-sided low back pain without sciatica 2021    Onset  at least 2 weeks ago. Reports intermittent pain that can last for at least 3 hours. Denies any trauma or injury. No leg weakness or swelling. She tried taking Motrin to help with the pain. No abdominal pain or nausea. Noticed the pain occurred after being intimate  with her spouse. Will order a urinalysis and follow up pending results. Prescribe a trial of Flexeril 10 mg at bedtime and Volt    Anemia     prescribed iron to take daily     Anemia, iron deficiency 2014    Formatting of this note might be different from the original.  - noted to be Fe deficient w/ anemia, reporting menorrhagia 2014 fe sat = 6% hgb = 8.9 2014 h/h = 13/41- resolved  History: has had difficulty with anemia 2nd to heavy menses. Is not taking Fe at this time. [] chronic, untreated -- will discuss need for Fe at next visit -- check Fe panel at next visit    Autoimmune disorder ([facility]     HIV disease ([facility]     Hyperthyroidism     No meds for several years    Infection due to yeast 2019    Complains of a vaginal discharge and feeling like there is a clump of something in her vagina. She does have some itching.   She relates she has a discharge frequently after she has intercourse with her  whether he uses a condom or not. We treated her for BV in February of this year and her symptoms resolved at that time.   On exam she does have a thick white discharge in the vault and wet     Systemic lupus erythematosus ([address]        Past Surgical History:   Procedure Laterality Date     SECTION      x 2     LAP,TUBAL CAUTERY      MYOMECTOMY      laparoscopic    OTHER SURGICAL HISTORY      tubal reversal     WISDOM TOOTH EXTRACTION         Family History   Problem Relation Age of Onset    Diabetes Mother     No Known Problems Father     Alzheimer's Disease Maternal Grandmother     No Known Problems Maternal Grandfather     No Known Problems Paternal Grandmother     Prostate Cancer Paternal Grandfather     Breast Cancer Neg Hx     Colon Cancer Neg Hx     Hypertension Neg Hx        Social History     Socioeconomic History    Marital status:      Spouse name: Not on file    Number of children: Not on file    Years of education: Not on file    Highest education level: Not on file   Occupational History    Not on file   Tobacco Use    Smoking status: Never    Smokeless tobacco: Never   Vaping Use    Vaping Use: Never used   Substance and Sexual Activity    Alcohol use: Not Currently    Drug use: Never    Sexual activity: Not Currently     Partners: Male   Other Topics Concern    Not on file   Social History Narrative    GYN: 2019  Abnormal Pap Smears: yes  Cervical Procedures: no  STDs: yes  chlamydia  and HIV (through 1st son's biological father)       Social Determinants of Health     Financial Resource Strain: Not on file   Food Insecurity: Not on file   Transportation Needs: Not on file   Physical Activity: Not on file   Stress: Not on file   Social Connections: Not on file   Intimate Partner Violence: Not on file   Housing Stability: Not on file       No Known Allergies      Review of Systems    Constitutional: No fevers or chills     Prenatal: + fetal movement, no VB/DC, no LOF     CV: No chest pain or palpatations    Resp: No SOB or cough    GI: No nausea/vomiting/diarrhea/constipation    Neuro: No HA, no seizure like activity    Skin: No rashes or lesions     Breast: No breast pain    : No dysuria or hematuria      Prenatal Record Review    The prenatal record and all pertinent labs has been reviewed.     Objective    Vitals:    12/08/22 0803   BP: 125/69   Pulse: 82   Resp: 18   Temp: 98.1 °F (36.7 °C)   TempSrc: Oral   SpO2: 97%        Physical Exam    Gen: alert and cooperative, NAD    HEENT: NCAT    CV: RRR    RESP: CTA bilat    ABD: Gravid, soft, NT    EXT: trace edema bilat    NEURO: No focal deficits    SKIN: No noted rashes or lesions       Polo Leonard MD  8:20 AM  12/08/22

## 2022-12-09 ENCOUNTER — ANESTHESIA (OUTPATIENT)
Dept: MOTHER INFANT UNIT | Age: 47
End: 2022-12-09

## 2022-12-09 ENCOUNTER — ANESTHESIA EVENT (OUTPATIENT)
Dept: MOTHER INFANT UNIT | Age: 47
End: 2022-12-09

## 2022-12-09 LAB
ERYTHROCYTE [DISTWIDTH] IN BLOOD BY AUTOMATED COUNT: 13.7 % (ref 11.9–14.6)
HCT VFR BLD AUTO: 32.3 % (ref 35.8–46.3)
HGB BLD-MCNC: 10.9 G/DL (ref 11.7–15.4)
MCH RBC QN AUTO: 32.2 PG (ref 26.1–32.9)
MCHC RBC AUTO-ENTMCNC: 33.7 G/DL (ref 31.4–35)
MCV RBC AUTO: 95.3 FL (ref 82–102)
NRBC # BLD: 0.02 K/UL (ref 0–0.2)
PLATELET # BLD AUTO: 162 K/UL (ref 150–450)
PMV BLD AUTO: 10.6 FL (ref 9.4–12.3)
RBC # BLD AUTO: 3.39 M/UL (ref 4.05–5.2)
WBC # BLD AUTO: 7.7 K/UL (ref 4.3–11.1)

## 2022-12-09 PROCEDURE — 6370000000 HC RX 637 (ALT 250 FOR IP): Performed by: OBSTETRICS & GYNECOLOGY

## 2022-12-09 PROCEDURE — 1100000000 HC RM PRIVATE

## 2022-12-09 PROCEDURE — 36415 COLL VENOUS BLD VENIPUNCTURE: CPT

## 2022-12-09 PROCEDURE — 85027 COMPLETE CBC AUTOMATED: CPT

## 2022-12-09 PROCEDURE — 6370000000 HC RX 637 (ALT 250 FOR IP): Performed by: ANESTHESIOLOGY

## 2022-12-09 PROCEDURE — 6360000002 HC RX W HCPCS: Performed by: ANESTHESIOLOGY

## 2022-12-09 RX ORDER — PRENATAL VIT/IRON FUM/FOLIC AC 27MG-0.8MG
1 TABLET ORAL DAILY
Status: DISCONTINUED | OUTPATIENT
Start: 2022-12-09 | End: 2022-12-11 | Stop reason: HOSPADM

## 2022-12-09 RX ORDER — SIMETHICONE 80 MG
80 TABLET,CHEWABLE ORAL EVERY 6 HOURS PRN
Status: DISCONTINUED | OUTPATIENT
Start: 2022-12-09 | End: 2022-12-11 | Stop reason: HOSPADM

## 2022-12-09 RX ORDER — LANOLIN
CREAM (ML) TOPICAL
Status: DISCONTINUED | OUTPATIENT
Start: 2022-12-09 | End: 2022-12-11 | Stop reason: HOSPADM

## 2022-12-09 RX ORDER — SODIUM CHLORIDE 0.9 % (FLUSH) 0.9 %
5-40 SYRINGE (ML) INJECTION PRN
Status: DISCONTINUED | OUTPATIENT
Start: 2022-12-09 | End: 2022-12-11 | Stop reason: HOSPADM

## 2022-12-09 RX ORDER — SODIUM CHLORIDE, SODIUM LACTATE, POTASSIUM CHLORIDE, CALCIUM CHLORIDE 600; 310; 30; 20 MG/100ML; MG/100ML; MG/100ML; MG/100ML
INJECTION, SOLUTION INTRAVENOUS CONTINUOUS
Status: DISCONTINUED | OUTPATIENT
Start: 2022-12-09 | End: 2022-12-11 | Stop reason: HOSPADM

## 2022-12-09 RX ORDER — SODIUM CHLORIDE 9 MG/ML
INJECTION, SOLUTION INTRAVENOUS PRN
Status: DISCONTINUED | OUTPATIENT
Start: 2022-12-09 | End: 2022-12-11 | Stop reason: HOSPADM

## 2022-12-09 RX ORDER — FERROUS SULFATE 325(65) MG
325 TABLET ORAL 2 TIMES DAILY WITH MEALS
Status: DISCONTINUED | OUTPATIENT
Start: 2022-12-09 | End: 2022-12-11 | Stop reason: HOSPADM

## 2022-12-09 RX ORDER — IBUPROFEN 800 MG/1
800 TABLET ORAL EVERY 8 HOURS
Status: DISCONTINUED | OUTPATIENT
Start: 2022-12-09 | End: 2022-12-11 | Stop reason: HOSPADM

## 2022-12-09 RX ORDER — METHYLERGONOVINE MALEATE 0.2 MG/ML
200 INJECTION INTRAVENOUS ONCE AS NEEDED
Status: DISCONTINUED | OUTPATIENT
Start: 2022-12-09 | End: 2022-12-11 | Stop reason: HOSPADM

## 2022-12-09 RX ORDER — OXYCODONE HYDROCHLORIDE 5 MG/1
5 TABLET ORAL EVERY 4 HOURS PRN
Status: DISCONTINUED | OUTPATIENT
Start: 2022-12-09 | End: 2022-12-11 | Stop reason: HOSPADM

## 2022-12-09 RX ORDER — ONDANSETRON 8 MG/1
8 TABLET, ORALLY DISINTEGRATING ORAL EVERY 8 HOURS PRN
Status: DISCONTINUED | OUTPATIENT
Start: 2022-12-09 | End: 2022-12-11 | Stop reason: HOSPADM

## 2022-12-09 RX ORDER — FAMOTIDINE 20 MG/1
20 TABLET, FILM COATED ORAL 2 TIMES DAILY
Status: DISCONTINUED | OUTPATIENT
Start: 2022-12-09 | End: 2022-12-11 | Stop reason: HOSPADM

## 2022-12-09 RX ORDER — DOCUSATE SODIUM 100 MG/1
100 CAPSULE, LIQUID FILLED ORAL 2 TIMES DAILY
Status: DISCONTINUED | OUTPATIENT
Start: 2022-12-09 | End: 2022-12-11 | Stop reason: HOSPADM

## 2022-12-09 RX ORDER — SODIUM CHLORIDE 0.9 % (FLUSH) 0.9 %
5-40 SYRINGE (ML) INJECTION EVERY 12 HOURS SCHEDULED
Status: DISCONTINUED | OUTPATIENT
Start: 2022-12-09 | End: 2022-12-11 | Stop reason: HOSPADM

## 2022-12-09 RX ORDER — POLYETHYLENE GLYCOL 3350 17 G/17G
17 POWDER, FOR SOLUTION ORAL DAILY
Status: DISCONTINUED | OUTPATIENT
Start: 2022-12-09 | End: 2022-12-11 | Stop reason: HOSPADM

## 2022-12-09 RX ORDER — OXYCODONE HYDROCHLORIDE 5 MG/1
10 TABLET ORAL EVERY 4 HOURS PRN
Status: DISCONTINUED | OUTPATIENT
Start: 2022-12-09 | End: 2022-12-11 | Stop reason: HOSPADM

## 2022-12-09 RX ORDER — DIPHENHYDRAMINE HYDROCHLORIDE 50 MG/ML
25 INJECTION INTRAMUSCULAR; INTRAVENOUS EVERY 6 HOURS PRN
Status: DISCONTINUED | OUTPATIENT
Start: 2022-12-09 | End: 2022-12-11 | Stop reason: HOSPADM

## 2022-12-09 RX ORDER — ACETAMINOPHEN 500 MG
1000 TABLET ORAL EVERY 8 HOURS PRN
Status: DISCONTINUED | OUTPATIENT
Start: 2022-12-09 | End: 2022-12-11 | Stop reason: HOSPADM

## 2022-12-09 RX ADMIN — ACETAMINOPHEN 1000 MG: 500 TABLET, FILM COATED ORAL at 15:43

## 2022-12-09 RX ADMIN — DOCUSATE SODIUM 100 MG: 100 CAPSULE ORAL at 15:44

## 2022-12-09 RX ADMIN — POLYETHYLENE GLYCOL 3350 17 G: 17 POWDER, FOR SOLUTION ORAL at 15:43

## 2022-12-09 RX ADMIN — NALBUPHINE HYDROCHLORIDE 5 MG: 10 INJECTION, SOLUTION INTRAMUSCULAR; INTRAVENOUS; SUBCUTANEOUS at 04:42

## 2022-12-09 RX ADMIN — ACETAMINOPHEN 1000 MG: 500 TABLET, FILM COATED ORAL at 03:09

## 2022-12-09 RX ADMIN — OXYCODONE 5 MG: 5 TABLET ORAL at 23:57

## 2022-12-09 RX ADMIN — DOCUSATE SODIUM 100 MG: 100 CAPSULE ORAL at 20:40

## 2022-12-09 RX ADMIN — KETOROLAC TROMETHAMINE 30 MG: 30 INJECTION, SOLUTION INTRAMUSCULAR; INTRAVENOUS at 11:25

## 2022-12-09 RX ADMIN — FERROUS SULFATE TAB 325 MG (65 MG ELEMENTAL FE) 325 MG: 325 (65 FE) TAB at 19:03

## 2022-12-09 RX ADMIN — FAMOTIDINE 20 MG: 20 TABLET, FILM COATED ORAL at 20:40

## 2022-12-09 RX ADMIN — IBUPROFEN 800 MG: 800 TABLET, FILM COATED ORAL at 19:03

## 2022-12-09 RX ADMIN — OXYCODONE 5 MG: 5 TABLET ORAL at 19:04

## 2022-12-09 RX ADMIN — KETOROLAC TROMETHAMINE 30 MG: 30 INJECTION, SOLUTION INTRAMUSCULAR; INTRAVENOUS at 06:10

## 2022-12-09 ASSESSMENT — PAIN SCALES - GENERAL
PAINLEVEL_OUTOF10: 4
PAINLEVEL_OUTOF10: 3
PAINLEVEL_OUTOF10: 3
PAINLEVEL_OUTOF10: 6

## 2022-12-09 ASSESSMENT — PAIN DESCRIPTION - ORIENTATION
ORIENTATION: ANTERIOR;LOWER

## 2022-12-09 ASSESSMENT — PAIN DESCRIPTION - LOCATION
LOCATION: ABDOMEN;INCISION

## 2022-12-09 ASSESSMENT — PAIN DESCRIPTION - DESCRIPTORS
DESCRIPTORS: SORE
DESCRIPTORS: SORE;PRESSURE
DESCRIPTORS: SORE

## 2022-12-09 NOTE — PLAN OF CARE
Problem: Postpartum  Goal: Experiences normal postpartum course  Description:  Postpartum OB-Pregnancy care plan goal which identifies if the mother is experiencing a normal postpartum course  2022 by Dulce Maria Michelle RN  Outcome: Progressing  2022 by Carolee Sneed RN  Outcome: Progressing  Goal: Appropriate maternal -  bonding  Description:  Postpartum OB-Pregnancy care plan goal which identifies if the mother and  are bonding appropriately  2022 by Dulce Maria Michelle RN  Outcome: Progressing  2022 by Carolee Sneed RN  Outcome: Progressing  Goal: Establishment of infant feeding pattern  Description:  Postpartum OB-Pregnancy care plan goal which identifies if the mother is establishing a feeding pattern with their   2022 by Dulce Maria Michelle RN  Outcome: Progressing  2022 by Carolee Sneed RN  Outcome: Progressing  Goal: Incisions, wounds, or drain sites healing without S/S of infection  2022 by Carolee Sneed RN  Outcome: Progressing     Problem: Pain  Goal: Verbalizes/displays adequate comfort level or baseline comfort level  Outcome: Progressing     Problem: Safety - Adult  Goal: Free from fall injury  Outcome: Progressing

## 2022-12-09 NOTE — PROGRESS NOTES
Progress Note                               Patient: Rudolph Maciel MRN: [id number]  SSN: [id number]    YOB: 1975  Age: 41 y.o. Sex: female      Postpartum Day Number 1 Day Post-Op     Subjective:     Patient doing well without significant complaints. Ambulating, voiding without difficulty Patient reports normal lochia. . Infant: Formula Feeding  Requests to restart miralax, apparently has long h/o constipation. Objective:     Patient Vitals for the past 18 hrs:   Temp Pulse Resp BP SpO2   22 0719 97.9 °F (36.6 °C) 67 17 123/73 98 %   22 0308 98.1 °F (36.7 °C) 77 16 125/64 100 %   22 2344 97.8 °F (36.6 °C) 68 16 (!) 113/58 97 %        Temp (24hrs), Av.8 °F (36.6 °C), Min:97.5 °F (36.4 °C), Max:98.1 °F (36.7 °C)      Physical Exam:    Patient without distress. Neuro / Psych grossly WNL, A&O X 3, Abdomen: Abdomen appropriately tender, Fundus Firm, without erythema, without hematoma, and without evidence of infection, Lower extremities:   - No evidence of DVT seen on physical exam.    Lab/Data Review: All lab results for the last 24 hours reviewed. CBC:    Recent Labs     22  0801 22  0718   WBC 5.1 7.7   HGB 12.6 10.9*   HCT 36.9 32.3*    162     GBS: No results found for: GRBSEXT  Blood Type:   Lab Results   Component Value Date/Time    ABORH B POSITIVE 2022 08:01 AM         Assessment and Plan:     Patient appears to be having an uncomplicated postpartum course. Continue routine perineal care and maternal education.     Juan Carroll MD

## 2022-12-09 NOTE — ANESTHESIA POSTPROCEDURE EVALUATION
Department of Anesthesiology  Postprocedure Note    Patient: Kaley Fortune  MRN: [id number]  YOB: 1975  Date of evaluation: 2022      Procedure Summary     Date: 22 Room / Location: [facility] L&D OR  [facility] L&D    Anesthesia Start: 1040 Anesthesia Stop: 1150    Procedure:  SECTION (Abdomen) Diagnosis:       History of       (AMA & HIV+ & Repeat )    Surgeons: Alina Saleh MD Responsible Provider: Gladys Villareal MD    Anesthesia Type: spinal ASA Status: 2          Anesthesia Type: No value filed.     Jose Luis Phase I: Jose Luis Score: 9    Jose Luis Phase II:        Anesthesia Post Evaluation    Patient location during evaluation: bedside  Patient participation: complete - patient participated  Level of consciousness: awake and alert  Airway patency: patent  Nausea & Vomiting: no vomiting and no nausea  Complications: no  Cardiovascular status: hemodynamically stable  Respiratory status: nonlabored ventilation and spontaneous ventilation  Hydration status: euvolemic  Comments: Lower extremity neurological function returned to baseline per patient  Multimodal analgesia pain management approach

## 2022-12-09 NOTE — PROGRESS NOTES
Patient up to bathroom with RN assistance. Helen-care taught and completed. Questions encouraged and answered. Patient back to bed, encouraged to call for needs or concerns. Verbalizes understanding.

## 2022-12-10 PROCEDURE — 6370000000 HC RX 637 (ALT 250 FOR IP): Performed by: OBSTETRICS & GYNECOLOGY

## 2022-12-10 PROCEDURE — 1100000000 HC RM PRIVATE

## 2022-12-10 RX ORDER — OXYCODONE HYDROCHLORIDE 5 MG/1
5 TABLET ORAL EVERY 4 HOURS PRN
Qty: 15 TABLET | Refills: 0 | Status: SHIPPED | OUTPATIENT
Start: 2022-12-10 | End: 2022-12-15

## 2022-12-10 RX ORDER — IBUPROFEN 800 MG/1
800 TABLET ORAL EVERY 8 HOURS PRN
Qty: 60 TABLET | Refills: 0 | Status: SHIPPED | OUTPATIENT
Start: 2022-12-10

## 2022-12-10 RX ADMIN — ACETAMINOPHEN 1000 MG: 500 TABLET, FILM COATED ORAL at 17:59

## 2022-12-10 RX ADMIN — IBUPROFEN 800 MG: 800 TABLET, FILM COATED ORAL at 22:39

## 2022-12-10 RX ADMIN — OXYCODONE 5 MG: 5 TABLET ORAL at 10:17

## 2022-12-10 RX ADMIN — DOCUSATE SODIUM 100 MG: 100 CAPSULE ORAL at 10:16

## 2022-12-10 RX ADMIN — FERROUS SULFATE TAB 325 MG (65 MG ELEMENTAL FE) 325 MG: 325 (65 FE) TAB at 10:16

## 2022-12-10 RX ADMIN — ACETAMINOPHEN 1000 MG: 500 TABLET, FILM COATED ORAL at 01:57

## 2022-12-10 RX ADMIN — DOCUSATE SODIUM 100 MG: 100 CAPSULE ORAL at 22:40

## 2022-12-10 RX ADMIN — IBUPROFEN 800 MG: 800 TABLET, FILM COATED ORAL at 14:33

## 2022-12-10 RX ADMIN — OXYCODONE 5 MG: 5 TABLET ORAL at 22:57

## 2022-12-10 RX ADMIN — POLYETHYLENE GLYCOL 3350 17 G: 17 POWDER, FOR SOLUTION ORAL at 10:15

## 2022-12-10 RX ADMIN — IBUPROFEN 800 MG: 800 TABLET, FILM COATED ORAL at 05:21

## 2022-12-10 RX ADMIN — ACETAMINOPHEN 1000 MG: 500 TABLET, FILM COATED ORAL at 10:16

## 2022-12-10 RX ADMIN — PRENATAL VIT W/ FE FUMARATE-FA TAB 27-0.8 MG 1 TABLET: 27-0.8 TAB at 10:16

## 2022-12-10 RX ADMIN — OXYCODONE 5 MG: 5 TABLET ORAL at 14:37

## 2022-12-10 ASSESSMENT — PAIN DESCRIPTION - ORIENTATION
ORIENTATION: ANTERIOR;LOWER
ORIENTATION: LOWER

## 2022-12-10 ASSESSMENT — PAIN DESCRIPTION - DESCRIPTORS
DESCRIPTORS: SORE
DESCRIPTORS: SORE;PRESSURE
DESCRIPTORS: SORE
DESCRIPTORS: SORE
DESCRIPTORS: PRESSURE;SORE

## 2022-12-10 ASSESSMENT — PAIN DESCRIPTION - LOCATION
LOCATION: ABDOMEN;INCISION
LOCATION: ABDOMEN;INCISION
LOCATION: ABDOMEN
LOCATION: ABDOMEN;INCISION
LOCATION: ABDOMEN;INCISION

## 2022-12-10 ASSESSMENT — PAIN SCALES - GENERAL
PAINLEVEL_OUTOF10: 4
PAINLEVEL_OUTOF10: 4
PAINLEVEL_OUTOF10: 3
PAINLEVEL_OUTOF10: 4
PAINLEVEL_OUTOF10: 4
PAINLEVEL_OUTOF10: 3

## 2022-12-10 NOTE — DISCHARGE SUMMARY
Obstetrical Discharge Summary     Name: Maurice Aguirre MRN: [id number]  SSN: [id number]    YOB: 1975  Age: 41 y.o. Sex: female      Allergies: Patient has no known allergies. Admit Date: 2022    Discharge Date: 12/10/2022     Admitting Physician: Guille Bolanos MD     Attending Physician:  Guille Bolanos MD     * Admission Diagnoses: [place] in third trimester [Z34.83]    * Discharge Diagnoses: Multigravida in third trimester [Z34.83]              Additional Diagnoses:   Hospital Problems             Last Modified POA    * (Principal) Multigravida in third trimester 2022 Yes     delivery delivered 2022 Yes        All lab results for the last 24 hours reviewed.      Immunizations:    Immunization History   Administered Date(s) Administered    COVID-19, PFIZER PURPLE top, DILUTE for use, (age 15 y+), 30mcg/0.3mL 2021, 2021, 2021    Hepatitis A Adult (Havrix, Vaqta) 2020    Influenza Virus Vaccine 10/01/2013, 10/15/2020    Influenza, FLUBLOK, (age 25 y+), PF, 0.5mL 09/10/2020    Influenza, FLUCELVAX, (age 10 mo+), MDCK, PF, 0.5mL 10/27/2022    Pneumococcal Conjugate 13-valent ([id number]) 2017    Pneumococcal Polysaccharide ([id number]) 2013, 2019    Tdap (Boostrix, Adacel) 2014, 06/15/2016, 10/27/2022    Tetanus Toxoid, absorbed 10/01/2013       Group Beta Strep: No components found for: OBEXTGRBS        Lab Results   Component Value Date/Time    ABORH B POSITIVE 2022 08:01 AM      Immunization History   Administered Date(s) Administered    COVID-19, PFIZER PURPLE top, DILUTE for use, (age 15 y+), 30mcg/0.3mL 2021, 2021, 2021    Hepatitis A Adult (Havrix, Vaqta) 2020    Influenza Virus Vaccine 10/01/2013, 10/15/2020    Influenza, FLUBLOK, (age 25 y+), PF, 0.5mL 09/10/2020    Influenza, FLUCELVAX, (age 10 mo+), MDCK, PF, 0.5mL 10/27/2022    Pneumococcal Conjugate 13-valent Michelle Gibbons) 2017    Pneumococcal Polysaccharide ([id number]) 2013, 2019    Tdap (Boostrix, Adacel) 2014, 06/15/2016, 10/27/2022    Tetanus Toxoid, absorbed 10/01/2013       * Procedures:   Procedure(s):   SECTION         * Discharge Condition: [facility] Course: Normal hospital course following the delivery. * Disposition: Home    Discharge Medications:      Medication List        START taking these medications      ibuprofen 800 MG tablet  Commonly known as: ADVIL;MOTRIN  Take 1 tablet by mouth every 8 hours as needed for Pain     oxyCODONE 5 MG immediate release tablet  Commonly known as: ROXICODONE  Take 1 tablet by mouth every 4 hours as needed for Pain for up to 5 days. CONTINUE taking these medications      abacavir-lamiVUDine 600-300 MG per tablet  Commonly known as: EPZICOM     darunavir 800 MG Tabs tablet  Commonly known as: PREZISTA     famotidine 20 MG tablet  Commonly known as: PEPCID  Take 1 tablet by mouth nightly as needed (reflux)     PRENATAL VITAMINS PO     ritonavir 100 MG tablet  Commonly known as: NORVIR            ASK your doctor about these medications      aspirin EC 81 MG EC tablet  Take 2 tablets by mouth in the morning.                Where to Get Your Medications        These medications were sent to [id number] - [address] [facility], [address] [place] [address], [address]      Phone: [phone]   ibuprofen 800 MG tablet  oxyCODONE 5 MG immediate release tablet         * Follow-up Care/Patient Instructions:    Isidoro Salmeron DO  [facility] [id number]  [phone]    Follow up in 2 week(s)  Routine Post-op Check       Activity: no lifting, Driving, or Strenuous exercise for at least 2-3 weeks and no sex for at least 6 weeks  Diet: regular diet  Wound Care: keep wound clean and dry

## 2022-12-10 NOTE — PROGRESS NOTES
Progress Note                               Patient: Maurice Aguirre MRN: [id number]  SSN: [id number]    YOB: 1975  Age: 41 y.o. Sex: female      Postpartum Day Number 2 Days Post-Op     Subjective:     Patient doing well without significant complaints. Ambulating, voiding without difficulty and Patient desires early discharge today. Patient reports normal lochia. . Infant: Formula Feeding    Objective:     Patient Vitals for the past 18 hrs:   Temp Pulse Resp BP SpO2   12/10/22 0815 97.5 °F (36.4 °C) 65 18 111/70 98 %   22 2357 -- -- 16 -- --   22 2313 98.2 °F (36.8 °C) 69 17 109/65 99 %        Temp (24hrs), Av °F (36.7 °C), Min:97.5 °F (36.4 °C), Max:98.2 °F (36.8 °C)      Physical Exam:    Patient without distress. Neuro / Psych grossly WNL, A&O X 3, Abdomen: Abdomen appropriately tender, Fundus Firm, without erythema, without hematoma, and without evidence of infection    Lab/Data Review: All lab results for the last 24 hours reviewed. GBS: No results found for: GRBSEXT  Blood Type:   Lab Results   Component Value Date/Time    ABORH B POSITIVE 2022 08:01 AM         Assessment and Plan:     Patient appears to be having an uncomplicated postpartum course. Continue routine perineal care and maternal education. , Will discharge home today.     Brenda Harris MD

## 2022-12-10 NOTE — DISCHARGE INSTRUCTIONS
Section: What to Expect at [address] [place] [address]     A  section, or , is surgery to deliver your baby through a cut that the doctor makes in your lower belly and uterus. The cut is called an incision. You may have some pain in your lower belly and need pain medicine for 1 to 2 weeks. You can expect some vaginal bleeding for several weeks. You will probably need about 6 weeks to fully recover. It's important to take it easy while the incision heals. Avoid heavy lifting, strenuous activities, and exercises that strain the belly muscles while you recover. Ask a family member or friend for help with housework, cooking, and shopping. This care sheet gives you a general idea about how long it will take for you to recover. But each person recovers at a different pace. Follow the steps below to get better as quickly as possible. How can you care for yourself at home? Activity    Rest when you feel tired. Getting enough sleep will help you recover. Try to walk each day. Start by walking a little more than you did the day before. Bit by bit, increase the amount you walk. Walking boosts blood flow and helps prevent pneumonia, constipation, and blood clots. Avoid strenuous activities, such as bicycle riding, jogging, weightlifting, and aerobic exercise, for 6 weeks or until your doctor says it is okay. Until your doctor says it is okay, do not lift anything heavier than your baby. Do not do sit-ups or other exercises that strain the belly muscles for 6 weeks or until your doctor says it is okay. Hold a pillow over your incision when you cough or take deep breaths. This will support your belly and decrease your pain. You may shower as usual. Pat the incision dry when you are done. You will have some vaginal bleeding. Wear sanitary pads. Do not douche or use tampons until your doctor says it is okay. Ask your doctor when you can drive again.      You will probably need to take at least 6 weeks off work. It depends on the type of work you do and how you feel. Ask your doctor when it is okay for you to have sex. Diet    You can eat your normal diet. If your stomach is upset, try bland, low-fat foods like plain rice, broiled chicken, toast, and yogurt. Drink plenty of fluids (unless your doctor tells you not to). You may notice that your bowel movements are not regular right after your surgery. This is common. Try to avoid constipation and straining with bowel movements. You may want to take a fiber supplement every day. If you have not had a bowel movement after a couple of days, ask your doctor about taking a mild laxative. If you are breastfeeding, limit alcohol. Alcohol can cause a lack of energy and other health problems for the baby when a breastfeeding woman drinks heavily. It can also get in the way of a mom's ability to feed her baby or to care for the child in other ways. There isn't a lot of research about exactly how much alcohol can harm a baby. Having no alcohol is the safest choice for your baby. If you choose to have a drink now and then, have only one drink, and limit the number of occasions that you have a drink. Wait to breastfeed at least 2 hours after you have a drink to reduce the amount of alcohol the baby may get in the milk. Medicines    Your doctor will tell you if and when you can restart your medicines. You will also get instructions about taking any new medicines. If you stopped taking aspirin or some other blood thinner, your doctor will tell you when to start taking it again. Take pain medicines exactly as directed. If the doctor gave you a prescription medicine for pain, take it as prescribed. If you are not taking a prescription pain medicine, ask your doctor if you can take an over-the-counter medicine. If you think your pain medicine is making you sick to your stomach:   Take your medicine after meals (unless your doctor has told you not to). Ask your doctor for a different pain medicine. If your doctor prescribed antibiotics, take them as directed. Do not stop taking them just because you feel better. You need to take the full course of antibiotics. Incision care    If you have strips of tape on the incision, leave the tape on for a week or until it falls off. Wash the area daily with warm, soapy water, and pat it dry. Don't use hydrogen peroxide or alcohol, which can slow healing. You may cover the area with a gauze bandage if it weeps or rubs against clothing. Change the bandage every day. Keep the area clean and dry. Other instructions    If you breastfeed your baby, you may be more comfortable while you are healing if you don't rest your baby on your belly. Try tucking your baby under your arm, with your baby's body along the side you will be feeding on. Support your baby's upper body with your arm. With that hand you can control your baby's head to bring your baby's mouth to your breast. This is sometimes called the football hold. Follow-up care is a key part of your treatment and safety. Be sure to make and go to all appointments, and call your doctor if you are having problems. It's also a good idea to know your test results and keep a list of the medicines you take. When should you call for help? Share this information with your partner, family, or a friend. They can help you watch for warning signs. Call 911  anytime you think you may need emergency care. For example, call if:    You have thoughts of harming yourself, your baby, or another person. You passed out (lost consciousness). You have chest pain, are short of breath, or cough up blood. You have a seizure. Call your doctor now or seek immediate medical care if:    You have loose stitches, or your incision comes open. You have signs of hemorrhage (too much bleeding), such as:  Heavy vaginal bleeding.  This means that you are soaking through one or more pads in an hour. Or you pass blood clots bigger than an egg. Feeling dizzy or lightheaded, or you feel like you may faint. Feeling so tired or weak that you cannot do your usual activities. A fast or irregular heartbeat. New or worse belly pain. You have symptoms of infection, such as: Increased pain, swelling, warmth, or redness. Red streaks leading from the incision. Pus draining from the incision. A fever. Vaginal discharge that smells bad. New or worse belly pain. You have symptoms of a blood clot in your leg (called a deep vein thrombosis), such as:  Pain in your calf, back of the knee, thigh, or groin. Redness and swelling in your leg or groin. You have signs of preeclampsia, such as:  Sudden swelling of your face, hands, or feet. New vision problems (such as dimness, blurring, or seeing spots). A severe headache. Watch closely for changes in your health, and be sure to contact your doctor if:    Your vaginal bleeding isn't decreasing. You feel sad, anxious, or hopeless for more than a few days. You are having problems with your breasts or breastfeeding. Where can you learn more? Go to http://www.woods.com/ and enter M806 to learn more about \" Section: What to Expect at Home. \"  Current as of: 2022               Content Version: 13.5   [employer]. Care instructions adapted under license by [facility] ([facility]). If you have questions about a medical condition or this instruction, always ask your healthcare professional. [place] [address] any warranty or liability for your use of this information.

## 2022-12-10 NOTE — PLAN OF CARE
Problem: Postpartum  Goal: Experiences normal postpartum course  Description:  Postpartum OB-Pregnancy care plan goal which identifies if the mother is experiencing a normal postpartum course  Outcome: Progressing  Goal: Appropriate maternal -  bonding  Description:  Postpartum OB-Pregnancy care plan goal which identifies if the mother and  are bonding appropriately  Outcome: Progressing  Goal: Establishment of infant feeding pattern  Description:  Postpartum OB-Pregnancy care plan goal which identifies if the mother is establishing a feeding pattern with their   Outcome: Progressing  Goal: Incisions, wounds, or drain sites healing without S/S of infection  Outcome: Progressing  Flowsheets (Taken 2022)  Incisions, Wounds, or Drain Sites Healing Without Sign and Symptoms of Infection: ADMISSION and DAILY: Assess and document risk factors for pressure ulcer development     Problem: Pain  Goal: Verbalizes/displays adequate comfort level or baseline comfort level  Outcome: Progressing     Problem: Infection - Adult  Goal: Absence of infection during hospitalization  Outcome: Progressing     Problem: Safety - Adult  Goal: Free from fall injury  Outcome: Progressing

## 2022-12-11 VITALS
RESPIRATION RATE: 16 BRPM | DIASTOLIC BLOOD PRESSURE: 71 MMHG | BODY MASS INDEX: 28.72 KG/M2 | HEART RATE: 73 BPM | SYSTOLIC BLOOD PRESSURE: 131 MMHG | WEIGHT: 183 LBS | OXYGEN SATURATION: 98 % | HEIGHT: 67 IN | TEMPERATURE: 97.7 F

## 2022-12-11 PROCEDURE — 6370000000 HC RX 637 (ALT 250 FOR IP): Performed by: OBSTETRICS & GYNECOLOGY

## 2022-12-11 RX ADMIN — FAMOTIDINE 20 MG: 20 TABLET, FILM COATED ORAL at 08:52

## 2022-12-11 RX ADMIN — FERROUS SULFATE TAB 325 MG (65 MG ELEMENTAL FE) 325 MG: 325 (65 FE) TAB at 16:09

## 2022-12-11 RX ADMIN — ACETAMINOPHEN 1000 MG: 500 TABLET, FILM COATED ORAL at 08:52

## 2022-12-11 RX ADMIN — FERROUS SULFATE TAB 325 MG (65 MG ELEMENTAL FE) 325 MG: 325 (65 FE) TAB at 08:52

## 2022-12-11 RX ADMIN — PRENATAL VIT W/ FE FUMARATE-FA TAB 27-0.8 MG 1 TABLET: 27-0.8 TAB at 08:52

## 2022-12-11 RX ADMIN — IBUPROFEN 800 MG: 800 TABLET, FILM COATED ORAL at 05:41

## 2022-12-11 RX ADMIN — POLYETHYLENE GLYCOL 3350 17 G: 17 POWDER, FOR SOLUTION ORAL at 08:53

## 2022-12-11 RX ADMIN — ACETAMINOPHEN 1000 MG: 500 TABLET, FILM COATED ORAL at 16:08

## 2022-12-11 ASSESSMENT — PAIN SCALES - GENERAL: PAINLEVEL_OUTOF10: 4

## 2022-12-11 ASSESSMENT — PAIN DESCRIPTION - DESCRIPTORS: DESCRIPTORS: ACHING;CRAMPING;SORE

## 2022-12-11 ASSESSMENT — PAIN DESCRIPTION - LOCATION: LOCATION: ABDOMEN;INCISION

## 2022-12-11 ASSESSMENT — PAIN - FUNCTIONAL ASSESSMENT: PAIN_FUNCTIONAL_ASSESSMENT: ACTIVITIES ARE NOT PREVENTED

## 2022-12-11 ASSESSMENT — PAIN DESCRIPTION - ORIENTATION: ORIENTATION: ANTERIOR;LOWER

## 2022-12-11 NOTE — PLAN OF CARE
Problem: Postpartum  Goal: Experiences normal postpartum course  Description:  Postpartum OB-Pregnancy care plan goal which identifies if the mother is experiencing a normal postpartum course  2022 1631 by Chela Miguel RN  Outcome: Completed     Problem: Postpartum  Goal: Appropriate maternal -  bonding  Description:  Postpartum OB-Pregnancy care plan goal which identifies if the mother and  are bonding appropriately  2022 1631 by Chela Miguel RN  Outcome: Completed     Problem: Postpartum  Goal: Establishment of infant feeding pattern  Description:  Postpartum OB-Pregnancy care plan goal which identifies if the mother is establishing a feeding pattern with their   2022 1631 by Chela Miguel RN  Outcome: Completed     Problem: Postpartum  Goal: Incisions, wounds, or drain sites healing without S/S of infection  2022 1631 by Chela Miguel RN  Outcome: Completed     Problem: Pain  Goal: Verbalizes/displays adequate comfort level or baseline comfort level  2022 1631 by Chela Miguel RN  Outcome: Completed     Problem: Infection - Adult  Goal: Absence of infection at discharge  2022 1631 by Chela Miguel RN  Outcome: Completed     Problem: Infection - Adult  Goal: Absence of infection during hospitalization  2022 1631 by Chela Miguel RN  Outcome: Completed     Problem: Infection - Adult  Goal: Absence of fever/infection during anticipated neutropenic period  2022 1631 by Chela Miguel RN  Outcome: Completed     Problem: Safety - Adult  Goal: Free from fall injury  2022 1631 by Chela Miguel RN  Outcome: Completed     Problem: Discharge Planning  Goal: Discharge to home or other facility with appropriate resources  2022 1631 by Chela Miguel RN  Outcome: Completed     Problem: Chronic Conditions and Co-morbidities  Goal: Patient's chronic conditions and co-morbidity symptoms are monitored and maintained or improved  12/11/2022 1631 by Rafa Meza RN  Outcome: Completed

## 2022-12-11 NOTE — PLAN OF CARE
Problem: Postpartum  Goal: Experiences normal postpartum course  Description:  Postpartum OB-Pregnancy care plan goal which identifies if the mother is experiencing a normal postpartum course  Outcome: Progressing  Goal: Appropriate maternal -  bonding  Description:  Postpartum OB-Pregnancy care plan goal which identifies if the mother and  are bonding appropriately  Outcome: Progressing  Goal: Establishment of infant feeding pattern  Description:  Postpartum OB-Pregnancy care plan goal which identifies if the mother is establishing a feeding pattern with their   Outcome: Progressing  Goal: Incisions, wounds, or drain sites healing without S/S of infection  Outcome: Progressing  Flowsheets (Taken 12/10/2022 2040)  Incisions, Wounds, or Drain Sites Healing Without Sign and Symptoms of Infection: TWICE DAILY: Assess and document dressing/incision, wound bed, drain sites and surrounding tissue     Problem: Pain  Goal: Verbalizes/displays adequate comfort level or baseline comfort level  Outcome: Progressing  Flowsheets (Taken 12/10/2022 2040)  Verbalizes/displays adequate comfort level or baseline comfort level:   Encourage patient to monitor pain and request assistance   Assess pain using appropriate pain scale   Administer analgesics based on type and severity of pain and evaluate response     Problem: Infection - Adult  Goal: Absence of infection at discharge  Outcome: Progressing  Goal: Absence of infection during hospitalization  Outcome: Progressing  Goal: Absence of fever/infection during anticipated neutropenic period  Outcome: Progressing     Problem: Safety - Adult  Goal: Free from fall injury  Outcome: Progressing     Problem: Discharge Planning  Goal: Discharge to home or other facility with appropriate resources  Outcome: Progressing     Problem: Chronic Conditions and Co-morbidities  Goal: Patient's chronic conditions and co-morbidity symptoms are monitored and maintained or improved  Outcome: Progressing

## 2022-12-11 NOTE — PROGRESS NOTES
Patient discharged to room per MD orders. Baby still inpatient at bedside. Discharge instructions reviewed with patient. Questions encouraged and answered. Patient verbalizes understanding. Stable at discharge.

## 2022-12-11 NOTE — PROGRESS NOTES
Discharge held until today for baby's meds. Stable w/o new c/o. Resume discharge. No change to D/C summary.

## 2022-12-11 NOTE — PLAN OF CARE
Problem: Postpartum  Goal: Experiences normal postpartum course  Description:  Postpartum OB-Pregnancy care plan goal which identifies if the mother is experiencing a normal postpartum course  2022 by Reyna Vásquez RN  Outcome: Progressing     Problem: Postpartum  Goal: Appropriate maternal -  bonding  Description:  Postpartum OB-Pregnancy care plan goal which identifies if the mother and  are bonding appropriately  2022 by Reyna Vásquez RN  Outcome: Progressing     Problem: Postpartum  Goal: Establishment of infant feeding pattern  Description:  Postpartum OB-Pregnancy care plan goal which identifies if the mother is establishing a feeding pattern with their   2022 by Reyna Vásquez RN  Outcome: Progressing     Problem: Postpartum  Goal: Incisions, wounds, or drain sites healing without S/S of infection  2022 by Reyna Vásquez RN  Outcome: Progressing  Flowsheets (Taken 2022)  Incisions, Wounds, or Drain Sites Healing Without Sign and Symptoms of Infection:   TWICE DAILY: Assess and document dressing/incision, wound bed, drain sites and surrounding tissue   Implement wound care per orders     Problem: Pain  Goal: Verbalizes/displays adequate comfort level or baseline comfort level  2022 by Reyna Vásquez RN  Outcome: Progressing  Flowsheets (Taken 2022 7506)  Verbalizes/displays adequate comfort level or baseline comfort level:   Encourage patient to monitor pain and request assistance   Assess pain using appropriate pain scale   Administer analgesics based on type and severity of pain and evaluate response   Implement non-pharmacological measures as appropriate and evaluate response  12/10/2022 2147 by Gudelia Shane RN  Outcome: Progressing  Flowsheets (Taken 12/10/2022 2040)  Verbalizes/displays adequate comfort level or baseline comfort level:   Encourage patient to monitor pain and request assistance   Assess pain using appropriate pain scale   Administer analgesics based on type and severity of pain and evaluate response     Problem: Infection - Adult  Goal: Absence of infection at discharge  12/11/2022 0935 by Karrie Martinez RN  Outcome: Progressing     Problem: Infection - Adult  Goal: Absence of infection during hospitalization  12/11/2022 0935 by Karrie Martinez RN  Outcome: Progressing     Problem: Infection - Adult  Goal: Absence of fever/infection during anticipated neutropenic period  12/11/2022 0935 by Karrie Martinez RN  Outcome: Progressing     Problem: Safety - Adult  Goal: Free from fall injury  12/11/2022 0935 by Karrie Martinez RN  Outcome: Progressing     Problem: Discharge Planning  Goal: Discharge to home or other facility with appropriate resources  12/11/2022 0935 by Karrie Martinez RN  Outcome: Progressing     Problem: Chronic Conditions and Co-morbidities  Goal: Patient's chronic conditions and co-morbidity symptoms are monitored and maintained or improved  12/11/2022 0935 by Karrie Martinez RN  Outcome: Progressing

## 2022-12-15 RX ORDER — FUROSEMIDE 20 MG/1
20 TABLET ORAL DAILY
Qty: 5 TABLET | Refills: 0 | Status: SHIPPED | OUTPATIENT
Start: 2022-12-15 | End: 2022-12-20

## 2022-12-22 ENCOUNTER — POSTPARTUM VISIT (OUTPATIENT)
Dept: OBGYN CLINIC | Age: 47
End: 2022-12-22

## 2022-12-22 VITALS
BODY MASS INDEX: 24.8 KG/M2 | HEIGHT: 67 IN | SYSTOLIC BLOOD PRESSURE: 114 MMHG | WEIGHT: 158 LBS | DIASTOLIC BLOOD PRESSURE: 76 MMHG

## 2022-12-22 PROCEDURE — 99902 PR PRENATAL VISIT: CPT | Performed by: OBSTETRICS & GYNECOLOGY

## 2022-12-22 NOTE — PROGRESS NOTES
CC:  C/S postop follow-up    HPI:  Clauedtte Hart presents for postop visit from  about 2 weeks ago. Patient doing well postpartum without significant complaints. Voiding without difficulty. Pain controlled on no medications at this point other than PRN Ibuprofen. Light spotting only. +BM/Flatus. Denies n/v, tolerating regular diet. ]Denies any si/sx pp depression. Patient Active Problem List    Diagnosis Date Noted    Multigravida in third trimester 2022     delivery delivered 2022    Other forms of systemic lupus erythematosus ([facility] [address] 2022    COVID-19 affecting pregnancy in first trimester 2022    High risk pregnancy, multigravida of advanced maternal age in third trimester 2022    History of  section complicating pregnancy     History of myomectomy 2022    History of thyroid disease 2022    Cortical cataract of both eyes 2022    Hyperopia of both eyes 2021    Heart murmur 2018    Anemia affecting pregnancy in third trimester 2018    Pregnancy resulting from in vitro fertilization in third trimester 2016    Uterine fibroid in pregnancy 2015    HIV disease affecting pregnancy in third trimester 2014    Heart block AV first degree 2014    Anemia, iron deficiency 2014          Physical Exam:   /76   Ht 5' 7\" (1.702 m)   Wt 158 lb (71.7 kg)   BMI 24.75 kg/m²   Constitutional: She appears well-developed and well-nourished. No distress. HENT:    Head: Normocephalic and atraumatic. Lungs: CTAB, effort normal  Cardiovascular: RRR, no M/R/G  Abd:  Fundus firm and well below umbilicus, S/NTTP/ND, BS normoactive. Incision c/d/i w/out erythema/induration   Skin: She is not diaphoretic. Psychiatric: She has a normal mood and affect. Her behavior is normal. Thought content normal.         A/P:  Stable Post op condition.   Gradually increase activity; continue pelvic rest and keep lifting to <15lbs until 6wks pp.   Follow up  4 weeks for routine 6wk pp apt

## 2023-01-19 ENCOUNTER — POSTPARTUM VISIT (OUTPATIENT)
Dept: OBGYN CLINIC | Age: 48
End: 2023-01-19

## 2023-01-19 VITALS
BODY MASS INDEX: 24.17 KG/M2 | SYSTOLIC BLOOD PRESSURE: 114 MMHG | HEIGHT: 67 IN | WEIGHT: 154 LBS | DIASTOLIC BLOOD PRESSURE: 72 MMHG

## 2023-01-19 NOTE — PROGRESS NOTES
6 week Postpartum Office Visit    Nicki Nayak is a 41 y.o. [id number] that presents for PP visit today    Birth Control: none    Breast/Bottle: bottle     Bleeding: light spotting     Baby: Doing well    Bowel/Bladder: No issues    Blues: None    Last pap:  2022    OB History    Para Term  AB Living   4 2 2   1 2   SAB IAB Ectopic Molar Multiple Live Births   1         2      # Outcome Date GA Lbr Luis E/2nd Weight Sex Delivery Anes PTL Lv   4             3 SAB  4w0d             Birth Comments: IVF pregnancy after transferring three D3 embryos   2 Term 16 38w0d  7 lb 11.6 oz (3.504 kg) F CS-LTranv Spinal N RAFY   1 Term 06 39w0d  7 lb 7 oz (3.374 kg) M CS-Unspec Spinal N RAFY      Birth Comments: Arrest of dilation @ 9cm         Past Medical History:   Diagnosis Date    Abnormal Pap smear of cervix     Acute left-sided low back pain without sciatica 2021    Onset  at least 2 weeks ago. Reports intermittent pain that can last for at least 3 hours. Denies any trauma or injury. No leg weakness or swelling. She tried taking Motrin to help with the pain. No abdominal pain or nausea. Noticed the pain occurred after being intimate  with her spouse. Will order a urinalysis and follow up pending results. Prescribe a trial of Flexeril 10 mg at bedtime and Volt    Anemia     prescribed iron to take daily     Anemia, iron deficiency 2014    Formatting of this note might be different from the original.  - noted to be Fe deficient w/ anemia, reporting menorrhagia 2014 fe sat = 6% hgb = 8.9 2014 h/h = - resolved  History: has had difficulty with anemia 2nd to heavy menses. Is not taking Fe at this time.  [] chronic, untreated -- will discuss need for Fe at next visit -- check Fe panel at next visit    Autoimmune disorder ([facility] [address]     HIV disease ([facility] [address]     Hyperthyroidism     No meds for several years    Infection due to yeast 2019    Complains of a vaginal discharge and feeling like there is a clump of something in her vagina. She does have some itching. She relates she has a discharge frequently after she has intercourse with her  whether he uses a condom or not. We treated her for BV in February of this year and her symptoms resolved at that time.   On exam she does have a thick white discharge in the vault and wet     Systemic lupus erythematosus ([address]        Past Surgical History:   Procedure Laterality Date     SECTION      x 2      SECTION N/A 2022     SECTION performed by Ernst Ortiz MD at [facility]    LAP,TUBAL CAUTERY      MYOMECTOMY      laparoscopic    OTHER SURGICAL HISTORY      tubal reversal     WISDOM TOOTH EXTRACTION          Social History     Socioeconomic History    Marital status:      Spouse name: Not on file    Number of children: Not on file    Years of education: Not on file    Highest education level: Not on file   Occupational History    Not on file   Tobacco Use    Smoking status: Never    Smokeless tobacco: Never   Vaping Use    Vaping Use: Never used   Substance and Sexual Activity    Alcohol use: Not Currently    Drug use: Never    Sexual activity: Not Currently     Partners: Male   Other Topics Concern    Not on file   Social History Narrative    GYN: 2019  Abnormal Pap Smears: yes  Cervical Procedures: no  STDs: yes  chlamydia  and HIV (through 1st son's biological father)       Social Determinants of Health     Financial Resource Strain: Not on file   Food Insecurity: Not on file   Transportation Needs: Not on file   Physical Activity: Not on file   Stress: Not on file   Social Connections: Not on file   Intimate Partner Violence: Not on file   Housing Stability: Not on file       Vitals:    23 0951   BP: 114/72        Review of Systems    Constitutional:  No fevers or chills  CV: No chest pain or palpitations  Resp: No SOB or cough  GI:  No nausea/vomiting/diarrhea/constipation  Neuro: No HA, no seizure like activity  Skin: No rashes or lesions  Breast: No breast pain, masses, bleeding  : No dysuria or hematuria  Gyn:  No menstrual, pelvic issues      Physical Exam    General:  well developed, well nourished, in no acute distress   Head:   normocephalic and atraumatic   Mouth:  no deformity or lesions with good dentition   Neck:  no masses, thyromegaly, or abnormal cervical nodes   Lungs:  clear bilaterally with normal respirations   Heart:   regular rate and rhythm, S1, S2 without murmurs   Abdomen:  bowel sounds positive; abdomen soft and non-tender without masses, organomegaly, or hernias noted. Diastasis rectus present   Msk:   no deformity or scoliosis noted with normal posture and gait   Extremities: no clubbing, cyanosis, edema, or deformity noted with normal full range of motion of all joints   Neurologic:  no focal deficits,normal coordination, muscle strength and tone   Skin:   intact without lesions or rashes   Psych:  alert and cooperative; normal mood and affect; normal attention span and concentration        A/p:  Postoperative/postpartum state: Doing well from postpartum state. Pap is up to date. Contraception plan POP    Contraception Counseling:  Discussed all forms of contraception available to her, including but not limited to condoms, OCPs, patches, rings, Depo Provera, LARCs (IUD vs Nexplanon), and permanent sterilization. Discussed benefits and possible risks/SEs of these, their typical use failure rates, and their expected effects on the pt's menstrual pattern. Currently being worked up for SLE. Discussed that estrogen is contraindicated with SLE. Samples given for SLYND. Not interested in LARC, vasectomy, or BTL. If she ends up not being diagnosed with SLE can given OCPs. Patient had FU appt pending.       Kisha Crawford DO   10:26 AM  01/19/23

## 2023-01-19 NOTE — LETTER
[address]  [address] [place] [address] [address]  Phone: [phone]  Fax: [phone]    Morteza Ridley DO        January 19, 2023     Patient: Kiko Tolbert   YOB: 1975   Date of Visit: 1/19/2023       To Whom It May Concern: It is my medical opinion that Trant Michelas may return to work on 1/20/23. If you have any questions or concerns, please don't hesitate to call.     Sincerely,        Morteza Ridley DO

## 2023-04-17 DIAGNOSIS — D50.9 IRON DEFICIENCY ANEMIA, UNSPECIFIED IRON DEFICIENCY ANEMIA TYPE: Primary | ICD-10-CM

## 2023-04-17 ASSESSMENT — ENCOUNTER SYMPTOMS
VOMITING: 0
CONSTIPATION: 0
SCLERAL ICTERUS: 0
ABDOMINAL DISTENTION: 0
SORE THROAT: 0
HEMOPTYSIS: 0
SHORTNESS OF BREATH: 0
CHEST TIGHTNESS: 0
DIARRHEA: 0
TROUBLE SWALLOWING: 0
NAUSEA: 0
ABDOMINAL PAIN: 0
VOICE CHANGE: 0
WHEEZING: 0
BLOOD IN STOOL: 0

## 2023-04-17 NOTE — PROGRESS NOTES
frequency, vaginal bleeding and vaginal discharge. Musculoskeletal:  Negative for arthralgias, flank pain, gait problem and myalgias. Skin:  Negative for itching, rash and wound. Neurological:  Negative for dizziness, extremity weakness, gait problem, headaches and numbness. Psychiatric/Behavioral:  Negative for confusion and depression. The patient is not nervous/anxious. No Known Allergies  Past Medical History:   Diagnosis Date    Abnormal Pap smear of cervix     Acute left-sided low back pain without sciatica 05/19/2021    Onset  at least 2 weeks ago. Reports intermittent pain that can last for at least 3 hours. Denies any trauma or injury. No leg weakness or swelling. She tried taking Motrin to help with the pain. No abdominal pain or nausea. Noticed the pain occurred after being intimate  with her spouse. Will order a urinalysis and follow up pending results. Prescribe a trial of Flexeril 10 mg at bedtime and Volt    Anemia     prescribed iron to take daily     Anemia, iron deficiency 02/27/2014    Formatting of this note might be different from the original. 2009 - noted to be Fe deficient w/ anemia, reporting menorrhagia 2014-02 fe sat = 6% hgb = 8.9 2014-05 h/h = 13/41- resolved  History: has had difficulty with anemia 2nd to heavy menses. Is not taking Fe at this time. [] chronic, untreated -- will discuss need for Fe at next visit -- check Fe panel at next visit    Autoimmune disorder ([facility] [address]     HIV disease ([facility] [address] 2003    Hyperthyroidism     No meds for several years    Infection due to yeast 8/30/2019    Complains of a vaginal discharge and feeling like there is a clump of something in her vagina. She does have some itching. She relates she has a discharge frequently after she has intercourse with her  whether he uses a condom or not. We treated her for BV in February of this year and her symptoms resolved at that time.   On exam she does have a thick white discharge in the vault

## 2023-04-21 ENCOUNTER — OFFICE VISIT (OUTPATIENT)
Dept: ONCOLOGY | Age: 48
End: 2023-04-21
Payer: OTHER GOVERNMENT

## 2023-04-21 ENCOUNTER — HOSPITAL ENCOUNTER (OUTPATIENT)
Dept: LAB | Age: 48
Setting detail: OUTPATIENT SURGERY
End: 2023-04-21
Payer: OTHER GOVERNMENT

## 2023-04-21 VITALS
OXYGEN SATURATION: 100 % | HEART RATE: 65 BPM | SYSTOLIC BLOOD PRESSURE: 146 MMHG | WEIGHT: 154.4 LBS | BODY MASS INDEX: 24.23 KG/M2 | RESPIRATION RATE: 26 BRPM | DIASTOLIC BLOOD PRESSURE: 84 MMHG | TEMPERATURE: 98.8 F | HEIGHT: 67 IN

## 2023-04-21 DIAGNOSIS — M32.8 OTHER FORMS OF SYSTEMIC LUPUS ERYTHEMATOSUS, UNSPECIFIED ORGAN INVOLVEMENT STATUS (HCC): ICD-10-CM

## 2023-04-21 DIAGNOSIS — R79.89 CREATININE ELEVATION: ICD-10-CM

## 2023-04-21 DIAGNOSIS — R89.4 SEROLOGIC ABNORMALITY: ICD-10-CM

## 2023-04-21 DIAGNOSIS — D50.9 IRON DEFICIENCY ANEMIA, UNSPECIFIED IRON DEFICIENCY ANEMIA TYPE: ICD-10-CM

## 2023-04-21 DIAGNOSIS — R77.9 ELEVATED BLOOD PROTEIN: ICD-10-CM

## 2023-04-21 DIAGNOSIS — E55.9 HYPOVITAMINOSIS D: ICD-10-CM

## 2023-04-21 DIAGNOSIS — D50.9 IRON DEFICIENCY ANEMIA, UNSPECIFIED IRON DEFICIENCY ANEMIA TYPE: Primary | ICD-10-CM

## 2023-04-21 LAB
ALBUMIN SERPL-MCNC: 4.4 G/DL (ref 3.5–5)
ALBUMIN SERPL-MCNC: 4.4 G/DL (ref 3.5–5)
ALBUMIN/GLOB SERPL: 0.9 (ref 0.4–1.6)
ALBUMIN/GLOB SERPL: 0.9 (ref 0.4–1.6)
ALP SERPL-CCNC: 80 U/L (ref 50–136)
ALP SERPL-CCNC: 80 U/L (ref 50–136)
ALT SERPL-CCNC: 26 U/L (ref 12–65)
ALT SERPL-CCNC: 36 U/L (ref 12–65)
ANION GAP SERPL CALC-SCNC: 3 MMOL/L (ref 2–11)
ANION GAP SERPL CALC-SCNC: 5 MMOL/L (ref 2–11)
AST SERPL-CCNC: 18 U/L (ref 15–37)
AST SERPL-CCNC: 24 U/L (ref 15–37)
BASOPHILS # BLD: 0.1 K/UL (ref 0–0.2)
BASOPHILS NFR BLD: 1 % (ref 0–2)
BILIRUB SERPL-MCNC: 0.3 MG/DL (ref 0.2–1.1)
BILIRUB SERPL-MCNC: 0.4 MG/DL (ref 0.2–1.1)
BUN SERPL-MCNC: 11 MG/DL (ref 6–23)
BUN SERPL-MCNC: 11 MG/DL (ref 6–23)
CALCIUM SERPL-MCNC: 9 MG/DL (ref 8.3–10.4)
CALCIUM SERPL-MCNC: 9.4 MG/DL (ref 8.3–10.4)
CHLORIDE SERPL-SCNC: 107 MMOL/L (ref 101–110)
CHLORIDE SERPL-SCNC: 108 MMOL/L (ref 101–110)
CO2 SERPL-SCNC: 25 MMOL/L (ref 21–32)
CO2 SERPL-SCNC: 26 MMOL/L (ref 21–32)
CREAT SERPL-MCNC: 1.1 MG/DL (ref 0.6–1)
CREAT SERPL-MCNC: 1.1 MG/DL (ref 0.6–1)
DIFFERENTIAL METHOD BLD: ABNORMAL
EOSINOPHIL # BLD: 0.1 K/UL (ref 0–0.8)
EOSINOPHIL NFR BLD: 1 % (ref 0.5–7.8)
ERYTHROCYTE [DISTWIDTH] IN BLOOD BY AUTOMATED COUNT: 13.5 % (ref 11.9–14.6)
ERYTHROCYTE [DISTWIDTH] IN BLOOD BY AUTOMATED COUNT: 13.6 % (ref 11.9–14.6)
FERRITIN SERPL-MCNC: 30 NG/ML (ref 8–388)
GLOBULIN SER CALC-MCNC: 4.7 G/DL (ref 2.8–4.5)
GLOBULIN SER CALC-MCNC: 4.7 G/DL (ref 2.8–4.5)
GLUCOSE SERPL-MCNC: 82 MG/DL (ref 65–100)
GLUCOSE SERPL-MCNC: 90 MG/DL (ref 65–100)
HCT VFR BLD AUTO: 43.5 % (ref 35.8–46.3)
HCT VFR BLD AUTO: 44.7 % (ref 35.8–46.3)
HGB BLD-MCNC: 14 G/DL (ref 11.7–15.4)
HGB BLD-MCNC: 14.1 G/DL (ref 11.7–15.4)
IMM GRANULOCYTES # BLD AUTO: 0 K/UL (ref 0–0.5)
IMM GRANULOCYTES NFR BLD AUTO: 0 % (ref 0–5)
IRON SATN MFR SERPL: 11 %
IRON SERPL-MCNC: 36 UG/DL (ref 35–150)
LYMPHOCYTES # BLD: 1.6 K/UL (ref 0.5–4.6)
LYMPHOCYTES NFR BLD: 42 % (ref 13–44)
MCH RBC QN AUTO: 31.4 PG (ref 26.1–32.9)
MCH RBC QN AUTO: 31.8 PG (ref 26.1–32.9)
MCHC RBC AUTO-ENTMCNC: 31.5 G/DL (ref 31.4–35)
MCHC RBC AUTO-ENTMCNC: 32.2 G/DL (ref 31.4–35)
MCV RBC AUTO: 100.7 FL (ref 82–102)
MCV RBC AUTO: 97.5 FL (ref 82–102)
MONOCYTES # BLD: 0.3 K/UL (ref 0.1–1.3)
MONOCYTES NFR BLD: 7 % (ref 4–12)
NEUTS SEG # BLD: 1.8 K/UL (ref 1.7–8.2)
NEUTS SEG NFR BLD: 49 % (ref 43–78)
NRBC # BLD: 0 K/UL (ref 0–0.2)
NRBC # BLD: 0 K/UL (ref 0–0.2)
PLATELET # BLD AUTO: 254 K/UL (ref 150–450)
PLATELET # BLD AUTO: 260 K/UL (ref 150–450)
PMV BLD AUTO: 10.2 FL (ref 9.4–12.3)
PMV BLD AUTO: 9.8 FL (ref 9.4–12.3)
POTASSIUM SERPL-SCNC: 3.6 MMOL/L (ref 3.5–5.1)
POTASSIUM SERPL-SCNC: 3.6 MMOL/L (ref 3.5–5.1)
PROT SERPL-MCNC: 9.1 G/DL (ref 6.3–8.2)
PROT SERPL-MCNC: 9.1 G/DL (ref 6.3–8.2)
RBC # BLD AUTO: 4.44 M/UL (ref 4.05–5.2)
RBC # BLD AUTO: 4.46 M/UL (ref 4.05–5.2)
SODIUM SERPL-SCNC: 136 MMOL/L (ref 133–143)
SODIUM SERPL-SCNC: 138 MMOL/L (ref 133–143)
TIBC SERPL-MCNC: 331 UG/DL (ref 250–450)
WBC # BLD AUTO: 3.7 K/UL (ref 4.3–11.1)
WBC # BLD AUTO: 3.7 K/UL (ref 4.3–11.1)

## 2023-04-21 PROCEDURE — 36415 COLL VENOUS BLD VENIPUNCTURE: CPT

## 2023-04-21 PROCEDURE — 85025 COMPLETE CBC W/AUTO DIFF WBC: CPT

## 2023-04-21 PROCEDURE — 83540 ASSAY OF IRON: CPT

## 2023-04-21 PROCEDURE — 82728 ASSAY OF FERRITIN: CPT

## 2023-04-21 PROCEDURE — 83550 IRON BINDING TEST: CPT

## 2023-04-21 PROCEDURE — 99214 OFFICE O/P EST MOD 30 MIN: CPT | Performed by: INTERNAL MEDICINE

## 2023-04-21 PROCEDURE — 80053 COMPREHEN METABOLIC PANEL: CPT

## 2023-04-21 RX ORDER — ABACAVIR SULFATE, DOLUTEGRAVIR SODIUM, LAMIVUDINE 600; 50; 300 MG/1; MG/1; MG/1
TABLET, FILM COATED ORAL
COMMUNITY
Start: 2023-04-03

## 2023-04-21 ASSESSMENT — PATIENT HEALTH QUESTIONNAIRE - PHQ9
1. LITTLE INTEREST OR PLEASURE IN DOING THINGS: 0
SUM OF ALL RESPONSES TO PHQ QUESTIONS 1-9: 0
SUM OF ALL RESPONSES TO PHQ9 QUESTIONS 1 & 2: 0
2. FEELING DOWN, DEPRESSED OR HOPELESS: 0
SUM OF ALL RESPONSES TO PHQ QUESTIONS 1-9: 0

## 2023-04-21 NOTE — PATIENT INSTRUCTIONS
Patient Instructions from Today's Visit    Reason for Visit:  Follow Up    Plan:  Labs reviewed.     Follow Up:  4-5 months    Recent Lab Results:  Hospital Outpatient Visit on 04/21/2023   Component Date Value Ref Range Status    WBC 04/21/2023 3.7 (L)  4.3 - 11.1 K/uL Final    RBC 04/21/2023 4.46  4.05 - 5.2 M/uL Final    Hemoglobin 04/21/2023 14.0  11.7 - 15.4 g/dL Final    Hematocrit 04/21/2023 43.5  35.8 - 46.3 % Final    MCV 04/21/2023 97.5  82.0 - 102.0 FL Final    MCH 04/21/2023 31.4  26.1 - 32.9 PG Final    MCHC 04/21/2023 32.2  31.4 - 35.0 g/dL Final    RDW 04/21/2023 13.5  11.9 - 14.6 % Final    Platelets 25/07/4553 254  150 - 450 K/uL Final    MPV 04/21/2023 9.8  9.4 - 12.3 FL Final    nRBC 04/21/2023 0.00  0.0 - 0.2 K/uL Final    **Note: Absolute NRBC parameter is now reported with Hemogram**    Seg Neutrophils 04/21/2023 49  43 - 78 % Final    Lymphocytes 04/21/2023 42  13 - 44 % Final    Monocytes 04/21/2023 7  4.0 - 12.0 % Final    Eosinophils % 04/21/2023 1  0.5 - 7.8 % Final    Basophils 04/21/2023 1  0.0 - 2.0 % Final    Immature Granulocytes 04/21/2023 0  0.0 - 5.0 % Final    Segs Absolute 04/21/2023 1.8  1.7 - 8.2 K/UL Final    Absolute Lymph # 04/21/2023 1.6  0.5 - 4.6 K/UL Final    Absolute Mono # 04/21/2023 0.3  0.1 - 1.3 K/UL Final    Absolute Eos # 04/21/2023 0.1  0.0 - 0.8 K/UL Final    Basophils Absolute 04/21/2023 0.1  0.0 - 0.2 K/UL Final    Absolute Immature Granulocyte 04/21/2023 0.0  0.0 - 0.5 K/UL Final    Differential Type 04/21/2023 AUTOMATED    Final         Treatment Summary has been discussed and given to patient: n/a        -------------------------------------------------------------------------------------------------------------------  Please call our office at [phone] if you have any  of the following symptoms:   Fever of 100.5 or greater  Chills  Shortness of breath  Swelling or pain in one leg    After office hours an answering service is available and will

## 2023-04-22 LAB — ERYTHROCYTE [SEDIMENTATION RATE] IN BLOOD: 15 MM/HR (ref 0–20)

## 2023-04-23 LAB
C3 SERPL-MCNC: 111 MG/DL (ref 82–167)
C4 SERPL-MCNC: 18 MG/DL (ref 12–38)

## 2023-04-24 LAB
CCP IGA+IGG SERPL IA-ACNC: 5 UNITS (ref 0–19)
CENTROMERE B AB SER-ACNC: <0.2 AI (ref 0–0.9)
CHROMATIN AB SERPL-ACNC: <0.2 AI (ref 0–0.9)
DSDNA AB SER-ACNC: 2 IU/ML (ref 0–9)
ENA JO1 AB SER-ACNC: <0.2 AI (ref 0–0.9)
ENA RNP AB SER-ACNC: 3.4 AI (ref 0–0.9)
ENA SCL70 AB SER-ACNC: <0.2 AI (ref 0–0.9)
ENA SM AB SER-ACNC: 0.2 AI (ref 0–0.9)
ENA SS-A AB SER-ACNC: 0.9 AI (ref 0–0.9)
ENA SS-B AB SER-ACNC: 6.6 AI (ref 0–0.9)
Lab: ABNORMAL
RHEUMATOID FACT SER QL LA: NEGATIVE

## 2023-04-28 DIAGNOSIS — D50.9 IRON DEFICIENCY ANEMIA, UNSPECIFIED IRON DEFICIENCY ANEMIA TYPE: ICD-10-CM

## 2023-04-28 DIAGNOSIS — M32.8 OTHER FORMS OF SYSTEMIC LUPUS ERYTHEMATOSUS, UNSPECIFIED ORGAN INVOLVEMENT STATUS (HCC): ICD-10-CM

## 2023-04-28 DIAGNOSIS — E55.9 HYPOVITAMINOSIS D: ICD-10-CM

## 2023-04-28 DIAGNOSIS — R89.4 SEROLOGIC ABNORMALITY: ICD-10-CM

## 2023-04-28 LAB
APPEARANCE UR: CLEAR
BACTERIA URNS QL MICRO: NEGATIVE /HPF
BILIRUB UR QL: NEGATIVE
CASTS URNS QL MICRO: ABNORMAL /LPF (ref 0–2)
COLOR UR: ABNORMAL
EPI CELLS #/AREA URNS HPF: ABNORMAL /HPF (ref 0–5)
GLUCOSE UR STRIP.AUTO-MCNC: NEGATIVE MG/DL
HGB UR QL STRIP: ABNORMAL
KETONES UR QL STRIP.AUTO: NEGATIVE MG/DL
LEUKOCYTE ESTERASE UR QL STRIP.AUTO: NEGATIVE
MUCOUS THREADS URNS QL MICRO: 0 /LPF
NITRITE UR QL STRIP.AUTO: NEGATIVE
PH UR STRIP: 6 (ref 5–9)
PROT UR STRIP-MCNC: ABNORMAL MG/DL
RBC #/AREA URNS HPF: >100 /HPF (ref 0–5)
SP GR UR REFRACTOMETRY: 1.03 (ref 1–1.02)
URINE CULTURE IF INDICATED: ABNORMAL
UROBILINOGEN UR QL STRIP.AUTO: 0.2 EU/DL (ref 0.2–1)
WBC URNS QL MICRO: ABNORMAL /HPF (ref 0–4)

## 2023-06-19 DIAGNOSIS — D50.9 IRON DEFICIENCY ANEMIA, UNSPECIFIED IRON DEFICIENCY ANEMIA TYPE: ICD-10-CM

## 2023-06-19 DIAGNOSIS — E55.9 HYPOVITAMINOSIS D: ICD-10-CM

## 2023-06-19 DIAGNOSIS — M32.8 OTHER FORMS OF SYSTEMIC LUPUS ERYTHEMATOSUS, UNSPECIFIED ORGAN INVOLVEMENT STATUS (HCC): Primary | ICD-10-CM

## 2023-06-19 DIAGNOSIS — R89.4 SEROLOGIC ABNORMALITY: ICD-10-CM

## 2023-06-19 DIAGNOSIS — O98.712 HIV DISEASE AFFECTING PREGNANCY IN SECOND TRIMESTER: ICD-10-CM

## 2023-07-03 DIAGNOSIS — M32.8 OTHER FORMS OF SYSTEMIC LUPUS ERYTHEMATOSUS, UNSPECIFIED ORGAN INVOLVEMENT STATUS (HCC): ICD-10-CM

## 2023-07-03 DIAGNOSIS — E55.9 HYPOVITAMINOSIS D: ICD-10-CM

## 2023-07-03 DIAGNOSIS — R89.4 SEROLOGIC ABNORMALITY: ICD-10-CM

## 2023-07-03 DIAGNOSIS — O98.712 HIV DISEASE AFFECTING PREGNANCY IN SECOND TRIMESTER: ICD-10-CM

## 2023-07-03 DIAGNOSIS — D50.9 IRON DEFICIENCY ANEMIA, UNSPECIFIED IRON DEFICIENCY ANEMIA TYPE: ICD-10-CM

## 2023-07-03 LAB
ALBUMIN SERPL-MCNC: 3.9 G/DL (ref 3.5–5)
ALBUMIN/GLOB SERPL: 0.8 (ref 0.4–1.6)
ALP SERPL-CCNC: 88 U/L (ref 50–136)
ALT SERPL-CCNC: 25 U/L (ref 12–65)
ANION GAP SERPL CALC-SCNC: 5 MMOL/L (ref 2–11)
APPEARANCE UR: CLEAR
AST SERPL-CCNC: 17 U/L (ref 15–37)
BACTERIA URNS QL MICRO: NEGATIVE /HPF
BILIRUB SERPL-MCNC: 0.5 MG/DL (ref 0.2–1.1)
BILIRUB UR QL: NEGATIVE
BUN SERPL-MCNC: 11 MG/DL (ref 6–23)
CALCIUM SERPL-MCNC: 9 MG/DL (ref 8.3–10.4)
CASTS URNS QL MICRO: ABNORMAL /LPF (ref 0–2)
CHLORIDE SERPL-SCNC: 108 MMOL/L (ref 101–110)
CO2 SERPL-SCNC: 25 MMOL/L (ref 21–32)
COLOR UR: ABNORMAL
CREAT SERPL-MCNC: 1.1 MG/DL (ref 0.6–1)
EPI CELLS #/AREA URNS HPF: ABNORMAL /HPF (ref 0–5)
ERYTHROCYTE [DISTWIDTH] IN BLOOD BY AUTOMATED COUNT: 12.1 % (ref 11.9–14.6)
ERYTHROCYTE [SEDIMENTATION RATE] IN BLOOD: 16 MM/HR (ref 0–20)
GLOBULIN SER CALC-MCNC: 4.6 G/DL (ref 2.8–4.5)
GLUCOSE SERPL-MCNC: 95 MG/DL (ref 65–100)
GLUCOSE UR STRIP.AUTO-MCNC: NEGATIVE MG/DL
HCT VFR BLD AUTO: 41.3 % (ref 35.8–46.3)
HGB BLD-MCNC: 13.2 G/DL (ref 11.7–15.4)
HGB UR QL STRIP: NEGATIVE
KETONES UR QL STRIP.AUTO: ABNORMAL MG/DL
LEUKOCYTE ESTERASE UR QL STRIP.AUTO: NEGATIVE
MCH RBC QN AUTO: 30.6 PG (ref 26.1–32.9)
MCHC RBC AUTO-ENTMCNC: 32 G/DL (ref 31.4–35)
MCV RBC AUTO: 95.8 FL (ref 82–102)
MUCOUS THREADS URNS QL MICRO: 0 /LPF
NITRITE UR QL STRIP.AUTO: NEGATIVE
NRBC # BLD: 0 K/UL (ref 0–0.2)
PH UR STRIP: 5.5 (ref 5–9)
PLATELET # BLD AUTO: 265 K/UL (ref 150–450)
PMV BLD AUTO: 10.5 FL (ref 9.4–12.3)
POTASSIUM SERPL-SCNC: 3.7 MMOL/L (ref 3.5–5.1)
PROT SERPL-MCNC: 8.5 G/DL (ref 6.3–8.2)
PROT UR STRIP-MCNC: NEGATIVE MG/DL
RBC # BLD AUTO: 4.31 M/UL (ref 4.05–5.2)
RBC #/AREA URNS HPF: ABNORMAL /HPF (ref 0–5)
SODIUM SERPL-SCNC: 138 MMOL/L (ref 133–143)
SP GR UR REFRACTOMETRY: 1.03 (ref 1–1.02)
URINE CULTURE IF INDICATED: ABNORMAL
UROBILINOGEN UR QL STRIP.AUTO: 0.2 EU/DL (ref 0.2–1)
WBC # BLD AUTO: 3.3 K/UL (ref 4.3–11.1)
WBC URNS QL MICRO: ABNORMAL /HPF (ref 0–4)

## 2023-07-04 LAB
C3 SERPL-MCNC: 112 MG/DL (ref 82–167)
C4 SERPL-MCNC: 16 MG/DL (ref 12–38)
CENTROMERE B AB SER-ACNC: <0.2 AI (ref 0–0.9)
CHROMATIN AB SERPL-ACNC: <0.2 AI (ref 0–0.9)
DSDNA AB SER-ACNC: 2 IU/ML (ref 0–9)
ENA JO1 AB SER-ACNC: <0.2 AI (ref 0–0.9)
ENA RNP AB SER-ACNC: 3.6 AI (ref 0–0.9)
ENA SCL70 AB SER-ACNC: <0.2 AI (ref 0–0.9)
ENA SM AB SER-ACNC: 0.2 AI (ref 0–0.9)
ENA SS-A AB SER-ACNC: 0.9 AI (ref 0–0.9)
ENA SS-B AB SER-ACNC: 7.6 AI (ref 0–0.9)
Lab: ABNORMAL
RHEUMATOID FACT SER QL LA: POSITIVE
RHEUMATOID FACT TITR SER LA: NORMAL {TITER}

## 2023-07-05 LAB — CCP IGA+IGG SERPL IA-ACNC: 7 UNITS (ref 0–19)

## 2023-07-06 ENCOUNTER — TELEMEDICINE (OUTPATIENT)
Dept: RHEUMATOLOGY | Age: 48
End: 2023-07-06
Payer: OTHER GOVERNMENT

## 2023-07-06 DIAGNOSIS — E55.9 HYPOVITAMINOSIS D: ICD-10-CM

## 2023-07-06 DIAGNOSIS — O98.712 HIV DISEASE AFFECTING PREGNANCY IN SECOND TRIMESTER: ICD-10-CM

## 2023-07-06 DIAGNOSIS — R89.4 SEROLOGIC ABNORMALITY: Primary | ICD-10-CM

## 2023-07-06 DIAGNOSIS — O09.523 HIGH RISK PREGNANCY, MULTIGRAVIDA OF ADVANCED MATERNAL AGE IN THIRD TRIMESTER: ICD-10-CM

## 2023-07-06 DIAGNOSIS — D50.9 IRON DEFICIENCY ANEMIA, UNSPECIFIED IRON DEFICIENCY ANEMIA TYPE: ICD-10-CM

## 2023-07-06 PROCEDURE — 99214 OFFICE O/P EST MOD 30 MIN: CPT | Performed by: INTERNAL MEDICINE

## 2023-08-31 ENCOUNTER — HOSPITAL ENCOUNTER (OUTPATIENT)
Dept: LAB | Age: 48
Discharge: HOME | End: 2023-08-31
Payer: OTHER GOVERNMENT

## 2023-08-31 ENCOUNTER — OFFICE VISIT (OUTPATIENT)
Dept: ONCOLOGY | Age: 48
End: 2023-08-31
Payer: OTHER GOVERNMENT

## 2023-08-31 VITALS
HEIGHT: 67 IN | DIASTOLIC BLOOD PRESSURE: 75 MMHG | HEART RATE: 68 BPM | WEIGHT: 149.3 LBS | TEMPERATURE: 98.5 F | RESPIRATION RATE: 16 BRPM | OXYGEN SATURATION: 97 % | SYSTOLIC BLOOD PRESSURE: 115 MMHG | BODY MASS INDEX: 23.43 KG/M2

## 2023-08-31 DIAGNOSIS — D50.9 IRON DEFICIENCY ANEMIA, UNSPECIFIED IRON DEFICIENCY ANEMIA TYPE: ICD-10-CM

## 2023-08-31 DIAGNOSIS — D50.9 IRON DEFICIENCY ANEMIA, UNSPECIFIED IRON DEFICIENCY ANEMIA TYPE: Primary | ICD-10-CM

## 2023-08-31 LAB
ALBUMIN SERPL-MCNC: 4.2 G/DL (ref 3.5–5)
ALBUMIN/GLOB SERPL: 0.8 (ref 0.4–1.6)
ALP SERPL-CCNC: 77 U/L (ref 50–136)
ALT SERPL-CCNC: 19 U/L (ref 12–65)
ANION GAP SERPL CALC-SCNC: 2 MMOL/L (ref 2–11)
AST SERPL-CCNC: 12 U/L (ref 15–37)
BASOPHILS # BLD: 0.1 K/UL (ref 0–0.2)
BASOPHILS NFR BLD: 2 % (ref 0–2)
BILIRUB SERPL-MCNC: 0.4 MG/DL (ref 0.2–1.1)
BUN SERPL-MCNC: 12 MG/DL (ref 6–23)
CALCIUM SERPL-MCNC: 8.6 MG/DL (ref 8.3–10.4)
CHLORIDE SERPL-SCNC: 108 MMOL/L (ref 101–110)
CO2 SERPL-SCNC: 28 MMOL/L (ref 21–32)
CREAT SERPL-MCNC: 1.1 MG/DL (ref 0.6–1)
DIFFERENTIAL METHOD BLD: ABNORMAL
EOSINOPHIL # BLD: 0 K/UL (ref 0–0.8)
EOSINOPHIL NFR BLD: 1 % (ref 0.5–7.8)
ERYTHROCYTE [DISTWIDTH] IN BLOOD BY AUTOMATED COUNT: 12.7 % (ref 11.9–14.6)
FERRITIN SERPL-MCNC: 7 NG/ML (ref 8–388)
GLOBULIN SER CALC-MCNC: 5.3 G/DL (ref 2.8–4.5)
GLUCOSE SERPL-MCNC: 92 MG/DL (ref 65–100)
HCT VFR BLD AUTO: 37.4 % (ref 35.8–46.3)
HGB BLD-MCNC: 11.3 G/DL (ref 11.7–15.4)
IMM GRANULOCYTES # BLD AUTO: 0 K/UL (ref 0–0.5)
IMM GRANULOCYTES NFR BLD AUTO: 0 % (ref 0–5)
IRON SATN MFR SERPL: 5 %
IRON SERPL-MCNC: 22 UG/DL (ref 35–150)
LYMPHOCYTES # BLD: 1.5 K/UL (ref 0.5–4.6)
LYMPHOCYTES NFR BLD: 43 % (ref 13–44)
MCH RBC QN AUTO: 26.6 PG (ref 26.1–32.9)
MCHC RBC AUTO-ENTMCNC: 30.2 G/DL (ref 31.4–35)
MCV RBC AUTO: 88 FL (ref 82–102)
MONOCYTES # BLD: 0.3 K/UL (ref 0.1–1.3)
MONOCYTES NFR BLD: 8 % (ref 4–12)
NEUTS SEG # BLD: 1.7 K/UL (ref 1.7–8.2)
NEUTS SEG NFR BLD: 46 % (ref 43–78)
NRBC # BLD: 0 K/UL (ref 0–0.2)
PLATELET # BLD AUTO: 342 K/UL (ref 150–450)
PMV BLD AUTO: 9.5 FL (ref 9.4–12.3)
POTASSIUM SERPL-SCNC: 3.5 MMOL/L (ref 3.5–5.1)
PROT SERPL-MCNC: 9.5 G/DL (ref 6.3–8.2)
RBC # BLD AUTO: 4.25 M/UL (ref 4.05–5.2)
SODIUM SERPL-SCNC: 138 MMOL/L (ref 133–143)
TIBC SERPL-MCNC: 456 UG/DL (ref 250–450)
WBC # BLD AUTO: 3.5 K/UL (ref 4.3–11.1)

## 2023-08-31 PROCEDURE — 82728 ASSAY OF FERRITIN: CPT

## 2023-08-31 PROCEDURE — 99214 OFFICE O/P EST MOD 30 MIN: CPT | Performed by: NURSE PRACTITIONER

## 2023-08-31 PROCEDURE — 85025 COMPLETE CBC W/AUTO DIFF WBC: CPT

## 2023-08-31 PROCEDURE — 83550 IRON BINDING TEST: CPT

## 2023-08-31 PROCEDURE — 36415 COLL VENOUS BLD VENIPUNCTURE: CPT

## 2023-08-31 PROCEDURE — 80053 COMPREHEN METABOLIC PANEL: CPT

## 2023-08-31 PROCEDURE — 83540 ASSAY OF IRON: CPT

## 2023-08-31 ASSESSMENT — ENCOUNTER SYMPTOMS
BLOOD IN STOOL: 0
SCLERAL ICTERUS: 0
ABDOMINAL DISTENTION: 0
SORE THROAT: 0
WHEEZING: 0
TROUBLE SWALLOWING: 0
ABDOMINAL PAIN: 0
NAUSEA: 0
DIARRHEA: 0
VOMITING: 0
VOICE CHANGE: 0
HEMOPTYSIS: 0
CONSTIPATION: 0
SHORTNESS OF BREATH: 0
CHEST TIGHTNESS: 0

## 2023-08-31 ASSESSMENT — PATIENT HEALTH QUESTIONNAIRE - PHQ9: DEPRESSION UNABLE TO ASSESS: PT REFUSES

## 2023-08-31 NOTE — PROGRESS NOTES
MG/DL    ALT 19 12 - 65 U/L    AST 12 (L) 15 - 37 U/L    Alk Phosphatase 77 50 - 136 U/L    Total Protein 9.5 (H) 6.3 - 8.2 g/dL    Albumin 4.2 3.5 - 5.0 g/dL    Globulin 5.3 (H) 2.8 - 4.5 g/dL    Albumin/Globulin Ratio 0.8 0.4 - 1.6     Ferritin    Collection Time: 08/31/23 12:29 PM   Result Value Ref Range    Ferritin 7 (L) 8 - 388 NG/ML   Transferrin Saturation    Collection Time: 08/31/23 12:29 PM   Result Value Ref Range    Iron 22 (L) 35 - 150 ug/dL    TIBC 456 (H) 250 - 450 ug/dL    TRANSFERRIN SATURATION 5 (L) >20 %         Imaging: reviewed       ASSESSMENT:     Diagnosis Orders   1. Iron deficiency anemia, unspecified iron deficiency anemia type            Ms. Gwen Alfredo is here for FU of anemia in pregnancy complicated by AMA, HIV and PATY. Anemia in pregnancy - resolved     - she is here for FU, doing well,  Hgb down to 11.3, TSAT 5%, ferritin 7   - she will restart oral iron MWF with OJ  - protein/globulin with ongoing mild elevation - will recheck at next visit, may need additional workup  - Cr stable at 1.1 - she will increase hydration     RESUSCITATION DIRECTIVES/HOSPICE CARE: Full Support    RTC 4 mo or sooner as needed       MDM      Lab studies and imaging studies were personally reviewed. Pertinent old records were reviewed. Historical:   - we discussed the pathophysiology of hematopoiesis in general and reviewed different etiologies of anemia. We also reviewed the changes that occur physically to woman's blood during pregnancy. We will proceed with CBC, peripheral smear, vitamin B12/folic acid.   Hep neg, TSH checked by GYN and normal.  She has been on oral iron for some time without good tolerance and no response.    - great response to tx so far; Hb normlaized and we will now monitor her for rest of pregnancy to determine if additional iron is needed  - labs discussed - TSAT 22% and ferritin up to 223   - no evidence of hemolysis   - has hx of SLE - anti-Ro/La Abs checked - Fetus

## 2023-08-31 NOTE — PATIENT INSTRUCTIONS
Hospital Outpatient Visit on 08/31/2023   Component Date Value Ref Range Status    WBC 08/31/2023 3.5 (L)  4.3 - 11.1 K/uL Final    RBC 08/31/2023 4.25  4.05 - 5.2 M/uL Final    Hemoglobin 08/31/2023 11.3 (L)  11.7 - 15.4 g/dL Final    Hematocrit 08/31/2023 37.4  35.8 - 46.3 % Final    MCV 08/31/2023 88.0  82.0 - 102.0 FL Final    MCH 08/31/2023 26.6  26.1 - 32.9 PG Final    MCHC 08/31/2023 30.2 (L)  31.4 - 35.0 g/dL Final    RDW 08/31/2023 12.7  11.9 - 14.6 % Final    Platelets 86/00/0697 342  150 - 450 K/uL Final    MPV 08/31/2023 9.5  9.4 - 12.3 FL Final    nRBC 08/31/2023 0.00  0.0 - 0.2 K/uL Final    **Note: Absolute NRBC parameter is now reported with Hemogram**    Neutrophils % 08/31/2023 46  43 - 78 % Final    Lymphocytes % 08/31/2023 43  13 - 44 % Final    Monocytes % 08/31/2023 8  4.0 - 12.0 % Final    Eosinophils % 08/31/2023 1  0.5 - 7.8 % Final    Basophils % 08/31/2023 2  0.0 - 2.0 % Final    Immature Granulocytes 08/31/2023 0  0.0 - 5.0 % Final    Neutrophils Absolute 08/31/2023 1.7  1.7 - 8.2 K/UL Final    Lymphocytes Absolute 08/31/2023 1.5  0.5 - 4.6 K/UL Final    Monocytes Absolute 08/31/2023 0.3  0.1 - 1.3 K/UL Final    Eosinophils Absolute 08/31/2023 0.0  0.0 - 0.8 K/UL Final    Basophils Absolute 08/31/2023 0.1  0.0 - 0.2 K/UL Final    Absolute Immature Granulocyte 08/31/2023 0.0  0.0 - 0.5 K/UL Final    Differential Type 08/31/2023 AUTOMATED    Final    Sodium 08/31/2023 138  133 - 143 mmol/L Final    Potassium 08/31/2023 3.5  3.5 - 5.1 mmol/L Final    Chloride 08/31/2023 108  101 - 110 mmol/L Final    CO2 08/31/2023 28  21 - 32 mmol/L Final    Anion Gap 08/31/2023 2  2 - 11 mmol/L Final    Glucose 08/31/2023 92  65 - 100 mg/dL Final    BUN 08/31/2023 12  6 - 23 MG/DL Final    Creatinine 08/31/2023 1.10 (H)  0.6 - 1.0 MG/DL Final    Est, Glom Filt Rate 08/31/2023 >60  >60 ml/min/1.73m2 Final    Comment:    Pediatric calculator link:

## 2024-02-09 DIAGNOSIS — D50.9 IRON DEFICIENCY ANEMIA, UNSPECIFIED IRON DEFICIENCY ANEMIA TYPE: Primary | ICD-10-CM

## 2024-02-09 NOTE — PROGRESS NOTES
original.  - noted to be Fe deficient w/ anemia, reporting menorrhagia 2014 fe sat = 6% hgb = 8.9 2014 h/h = 13/41- resolved  History: has had difficulty with anemia 2nd to heavy menses. Is not taking Fe at this time. [] chronic, untreated -- will discuss need for Fe at next visit -- check Fe panel at next visit    Autoimmune disorder (HCC)     HIV disease (HCC)     Hyperthyroidism     No meds for several years    Infection due to yeast 2019    Complains of a vaginal discharge and feeling like there is a clump of something in her vagina.  She does have some itching.  She relates she has a discharge frequently after she has intercourse with her  whether he uses a condom or not.  We treated her for BV in February of this year and her symptoms resolved at that time.  On exam she does have a thick white discharge in the vault and wet     Systemic lupus erythematosus (HCC)      Past Surgical History:   Procedure Laterality Date     SECTION      x 2      SECTION N/A 2022     SECTION performed by Koffi Julien [place] [facility]    LAP,TUBAL CAUTERY      MYOMECTOMY      laparoscopic    OTHER SURGICAL HISTORY      tubal reversal     WISDOM TOOTH EXTRACTION       Current Outpatient Medications   Medication Sig Dispense Refill    ferrous sulfate (IRON 325) 325 (65 Fe) MG tablet Take 1 tablet by mouth every other day      Biotin w/ Vitamins C & E (HAIR/SKIN/NAILS PO) Take by mouth daily      TRIUMEQ 600- MG TABS daily       No current facility-administered medications for this visit.     OBJECTIVE:  /70   Pulse 71   Temp 98.3 °F (36.8 °C)   Resp 16   Ht 1.702 m (5' 7\")   Wt 67.1 kg (148 lb)   SpO2 100%   BMI 23.18 kg/m²     ECOG PERFORMANCE STATUS - 0-Fully active, able to carry on all pre-disease performance without restriction.  Pain - 0 - No pain/10. None/Minimal pain - not affecting QOL     Physical Exam  Vitals reviewed.   Constitutional:

## 2024-02-12 ENCOUNTER — OFFICE VISIT (OUTPATIENT)
Dept: ONCOLOGY | Age: 49
End: 2024-02-12
Payer: OTHER GOVERNMENT

## 2024-02-12 ENCOUNTER — HOSPITAL ENCOUNTER (OUTPATIENT)
Dept: LAB | Age: 49
Discharge: HOME | End: 2024-02-15
Payer: OTHER GOVERNMENT

## 2024-02-12 VITALS
BODY MASS INDEX: 23.23 KG/M2 | OXYGEN SATURATION: 100 % | HEART RATE: 71 BPM | HEIGHT: 67 IN | SYSTOLIC BLOOD PRESSURE: 108 MMHG | RESPIRATION RATE: 16 BRPM | TEMPERATURE: 98.3 F | DIASTOLIC BLOOD PRESSURE: 70 MMHG | WEIGHT: 148 LBS

## 2024-02-12 DIAGNOSIS — Z12.31 ENCOUNTER FOR SCREENING MAMMOGRAM FOR MALIGNANT NEOPLASM OF BREAST: ICD-10-CM

## 2024-02-12 DIAGNOSIS — D50.9 IRON DEFICIENCY ANEMIA, UNSPECIFIED IRON DEFICIENCY ANEMIA TYPE: ICD-10-CM

## 2024-02-12 DIAGNOSIS — R77.9 ELEVATED BLOOD PROTEIN: ICD-10-CM

## 2024-02-12 DIAGNOSIS — D50.9 IRON DEFICIENCY ANEMIA, UNSPECIFIED IRON DEFICIENCY ANEMIA TYPE: Primary | ICD-10-CM

## 2024-02-12 LAB
ALBUMIN SERPL-MCNC: 4 G/DL (ref 3.5–5)
ALBUMIN/GLOB SERPL: 0.8 (ref 0.4–1.6)
ALP SERPL-CCNC: 88 U/L (ref 50–136)
ALT SERPL-CCNC: 25 U/L (ref 12–65)
ANION GAP SERPL CALC-SCNC: 8 MMOL/L (ref 2–11)
AST SERPL-CCNC: 19 U/L (ref 15–37)
BASOPHILS # BLD: 0.1 K/UL (ref 0–0.2)
BASOPHILS NFR BLD: 1 % (ref 0–2)
BILIRUB SERPL-MCNC: 0.3 MG/DL (ref 0.2–1.1)
BUN SERPL-MCNC: 9 MG/DL (ref 6–23)
CALCIUM SERPL-MCNC: 8.8 MG/DL (ref 8.3–10.4)
CHLORIDE SERPL-SCNC: 106 MMOL/L (ref 103–113)
CO2 SERPL-SCNC: 26 MMOL/L (ref 21–32)
CREAT SERPL-MCNC: 1 MG/DL (ref 0.6–1)
DIFFERENTIAL METHOD BLD: ABNORMAL
EOSINOPHIL # BLD: 0 K/UL (ref 0–0.8)
EOSINOPHIL NFR BLD: 1 % (ref 0.5–7.8)
ERYTHROCYTE [DISTWIDTH] IN BLOOD BY AUTOMATED COUNT: 14.4 % (ref 11.9–14.6)
FERRITIN SERPL-MCNC: 9 NG/ML (ref 8–388)
GLOBULIN SER CALC-MCNC: 5.2 G/DL (ref 2.8–4.5)
GLUCOSE SERPL-MCNC: 102 MG/DL (ref 65–100)
HCT VFR BLD AUTO: 40.3 % (ref 35.8–46.3)
HGB BLD-MCNC: 12.3 G/DL (ref 11.7–15.4)
IMM GRANULOCYTES # BLD AUTO: 0 K/UL (ref 0–0.5)
IMM GRANULOCYTES NFR BLD AUTO: 0 % (ref 0–5)
IRON SATN MFR SERPL: 51 %
IRON SERPL-MCNC: 215 UG/DL (ref 35–150)
LYMPHOCYTES # BLD: 1.4 K/UL (ref 0.5–4.6)
LYMPHOCYTES NFR BLD: 39 % (ref 13–44)
MCH RBC QN AUTO: 27.5 PG (ref 26.1–32.9)
MCHC RBC AUTO-ENTMCNC: 30.5 G/DL (ref 31.4–35)
MCV RBC AUTO: 90.2 FL (ref 82–102)
MONOCYTES # BLD: 0.2 K/UL (ref 0.1–1.3)
MONOCYTES NFR BLD: 6 % (ref 4–12)
NEUTS SEG # BLD: 1.9 K/UL (ref 1.7–8.2)
NEUTS SEG NFR BLD: 53 % (ref 43–78)
NRBC # BLD: 0 K/UL (ref 0–0.2)
PLATELET # BLD AUTO: 288 K/UL (ref 150–450)
PMV BLD AUTO: 9.5 FL (ref 9.4–12.3)
POTASSIUM SERPL-SCNC: 3.4 MMOL/L (ref 3.5–5.1)
PROT SERPL-MCNC: 9.2 G/DL (ref 6.3–8.2)
RBC # BLD AUTO: 4.47 M/UL (ref 4.05–5.2)
SODIUM SERPL-SCNC: 140 MMOL/L (ref 136–146)
TIBC SERPL-MCNC: 419 UG/DL (ref 250–450)
WBC # BLD AUTO: 3.6 K/UL (ref 4.3–11.1)

## 2024-02-12 PROCEDURE — 83540 ASSAY OF IRON: CPT

## 2024-02-12 PROCEDURE — 85025 COMPLETE CBC W/AUTO DIFF WBC: CPT

## 2024-02-12 PROCEDURE — 86334 IMMUNOFIX E-PHORESIS SERUM: CPT

## 2024-02-12 PROCEDURE — 82728 ASSAY OF FERRITIN: CPT

## 2024-02-12 PROCEDURE — 82784 ASSAY IGA/IGD/IGG/IGM EACH: CPT

## 2024-02-12 PROCEDURE — 83550 IRON BINDING TEST: CPT

## 2024-02-12 PROCEDURE — 99214 OFFICE O/P EST MOD 30 MIN: CPT | Performed by: INTERNAL MEDICINE

## 2024-02-12 PROCEDURE — 80053 COMPREHEN METABOLIC PANEL: CPT

## 2024-02-12 PROCEDURE — 36415 COLL VENOUS BLD VENIPUNCTURE: CPT

## 2024-02-12 PROCEDURE — 84165 PROTEIN E-PHORESIS SERUM: CPT

## 2024-02-12 RX ORDER — FERROUS SULFATE 325(65) MG
1 TABLET ORAL EVERY OTHER DAY
COMMUNITY

## 2024-02-15 ENCOUNTER — OFFICE VISIT (OUTPATIENT)
Dept: OBGYN CLINIC | Age: 49
End: 2024-02-15
Payer: OTHER GOVERNMENT

## 2024-02-15 VITALS — SYSTOLIC BLOOD PRESSURE: 126 MMHG | DIASTOLIC BLOOD PRESSURE: 84 MMHG | BODY MASS INDEX: 23.18 KG/M2 | WEIGHT: 148 LBS

## 2024-02-15 DIAGNOSIS — N92.0 MENORRHAGIA WITH REGULAR CYCLE: ICD-10-CM

## 2024-02-15 DIAGNOSIS — Z11.3 SCREEN FOR STD (SEXUALLY TRANSMITTED DISEASE): ICD-10-CM

## 2024-02-15 DIAGNOSIS — Z01.419 WELL WOMAN EXAM WITH ROUTINE GYNECOLOGICAL EXAM: Primary | ICD-10-CM

## 2024-02-15 DIAGNOSIS — Z11.51 SCREENING FOR HPV (HUMAN PAPILLOMAVIRUS): ICD-10-CM

## 2024-02-15 PROCEDURE — 99459 PELVIC EXAMINATION: CPT | Performed by: OBSTETRICS & GYNECOLOGY

## 2024-02-15 PROCEDURE — 99396 PREV VISIT EST AGE 40-64: CPT | Performed by: OBSTETRICS & GYNECOLOGY

## 2024-02-15 PROCEDURE — 99213 OFFICE O/P EST LOW 20 MIN: CPT | Performed by: OBSTETRICS & GYNECOLOGY

## 2024-02-15 NOTE — PROGRESS NOTES
urethra midline  Vagina pink, moist, well rugated  Cervix without lesion/masses, DC wnl. Appears stenotic and pulled anteriorly   Uterus normal in size and contour, no masses, NTTP  Adnexa without masses, NTTP    Chaperone used for pelvic exam     Breasts:   Symmetric, no lesions, masses, rashes, no abnl nipple Dc       Counseling:  Discussed General Recommendations:  -Routine Pap (unless cervix removed for benign reasons)  -STD screening annually pts </= 24yo or high risk   -lipid profile every 5 yrs  -Tdap once and then Td every 10yrs  -Influenza Vaccine, annually  -Healthy eating/exercise       ASSESSMENT/PLAN:   48 y.o., , for annual GYN exam:    1) Annual:   -Cotesting today    -Declines STD testing    -Rx none    -safe sexual practices    -FU w/ PCP for all non-GYN medical issues and regular screening     -annual Flu vaccine recommended    -healthy eating, exercise    - Nuswab for BV/Yeast due to vaginal discharge     2) HMB:   All options discussed. Patient considering daily Aygestin to suppress cycle. Information also given for Mirena IUD and Endometrial ablation. Cervix does appear stenotic (3 prior CS) so unsure if I will be able to place IUD in the office, but can certainly try. She would like to avoid hysterectomy if at all possible. R/B/A to each reviewed. An additional 15 minutes of counseling was performed separate from routine preventative visit and her hematology notes were reviewed.     Kisha Crawford DO

## 2024-02-17 LAB
A VAGINAE DNA VAG QL NAA+PROBE: NORMAL SCORE
BVAB2 DNA VAG QL NAA+PROBE: NORMAL SCORE
C ALBICANS DNA VAG QL NAA+PROBE: NEGATIVE
C GLABRATA DNA VAG QL NAA+PROBE: NEGATIVE
MEGA1 DNA VAG QL NAA+PROBE: NORMAL SCORE
SPECIMEN SOURCE: NORMAL

## 2024-02-19 LAB
A VAGINAE DNA VAG QL NAA+PROBE: NORMAL SCORE
BVAB2 DNA VAG QL NAA+PROBE: NORMAL SCORE
C ALBICANS DNA VAG QL NAA+PROBE: NEGATIVE
C GLABRATA DNA VAG QL NAA+PROBE: NEGATIVE
MEGA1 DNA VAG QL NAA+PROBE: NORMAL SCORE
SPECIMEN SOURCE: NORMAL
T VAGINALIS RRNA SPEC QL NAA+PROBE: NEGATIVE

## 2024-02-20 LAB
ALBUMIN SERPL ELPH-MCNC: 4.2 G/DL (ref 2.9–4.4)
ALBUMIN/GLOB SERPL: 1.1 (ref 0.7–1.7)
ALPHA1 GLOB SERPL ELPH-MCNC: 0.2 G/DL (ref 0–0.4)
ALPHA2 GLOB SERPL ELPH-MCNC: 0.5 G/DL (ref 0.4–1)
B-GLOBULIN SERPL ELPH-MCNC: 1 G/DL (ref 0.7–1.3)
GAMMA GLOB SERPL ELPH-MCNC: 2.1 G/DL (ref 0.4–1.8)
GLOBULIN SER-MCNC: 3.9 G/DL (ref 2.2–3.9)
IGA SERPL-MCNC: 532 MG/DL (ref 87–352)
IGG SERPL-MCNC: 2145 MG/DL (ref 586–1602)
IGM SERPL-MCNC: 139 MG/DL (ref 26–217)
INTERPRETATION SERPL IEP-IMP: ABNORMAL
M PROTEIN SERPL ELPH-MCNC: ABNORMAL G/DL
PROT SERPL-MCNC: 8.1 G/DL (ref 6–8.5)

## 2024-02-22 LAB
COLLECTION METHOD: NORMAL
CYTOLOGIST CVX/VAG CYTO: NORMAL
CYTOLOGY CVX/VAG DOC THIN PREP: NORMAL
DATE OF LMP: NORMAL
HPV APTIMA: NEGATIVE
Lab: NORMAL
Lab: NORMAL
PAP SOURCE: NORMAL
PATH REPORT.FINAL DX SPEC: NORMAL
PREV TREATMENT: NORMAL
STAT OF ADQ CVX/VAG CYTO-IMP: NORMAL

## 2024-03-16 ENCOUNTER — HOSPITAL ENCOUNTER (OUTPATIENT)
Dept: MAMMOGRAPHY | Age: 49
End: 2024-03-16
Payer: OTHER GOVERNMENT

## 2024-03-16 DIAGNOSIS — Z12.31 ENCOUNTER FOR SCREENING MAMMOGRAM FOR MALIGNANT NEOPLASM OF BREAST: ICD-10-CM

## 2024-03-16 PROCEDURE — 77063 BREAST TOMOSYNTHESIS BI: CPT

## 2024-05-17 ENCOUNTER — HOSPITAL ENCOUNTER (OUTPATIENT)
Dept: LAB | Age: 49
Discharge: HOME | End: 2024-05-17
Payer: OTHER GOVERNMENT

## 2024-05-17 DIAGNOSIS — R77.9 ELEVATED BLOOD PROTEIN: ICD-10-CM

## 2024-05-17 DIAGNOSIS — D50.9 IRON DEFICIENCY ANEMIA, UNSPECIFIED IRON DEFICIENCY ANEMIA TYPE: ICD-10-CM

## 2024-05-17 LAB
ALBUMIN SERPL-MCNC: 4.2 G/DL (ref 3.5–5)
ALBUMIN/GLOB SERPL: 1 (ref 1–1.9)
ALP SERPL-CCNC: 55 U/L (ref 35–104)
ALT SERPL-CCNC: 13 U/L (ref 12–65)
ANION GAP SERPL CALC-SCNC: 10 MMOL/L (ref 9–18)
AST SERPL-CCNC: 20 U/L (ref 15–37)
BASOPHILS # BLD: 0 K/UL (ref 0–0.2)
BASOPHILS NFR BLD: 1 % (ref 0–2)
BILIRUB SERPL-MCNC: 0.3 MG/DL (ref 0–1.2)
BUN SERPL-MCNC: 10 MG/DL (ref 6–23)
CALCIUM SERPL-MCNC: 8.9 MG/DL (ref 8.8–10.2)
CHLORIDE SERPL-SCNC: 105 MMOL/L (ref 98–107)
CO2 SERPL-SCNC: 24 MMOL/L (ref 20–28)
CREAT SERPL-MCNC: 1.03 MG/DL (ref 0.6–1.1)
DIFFERENTIAL METHOD BLD: ABNORMAL
EOSINOPHIL # BLD: 0 K/UL (ref 0–0.8)
EOSINOPHIL NFR BLD: 1 % (ref 0.5–7.8)
ERYTHROCYTE [DISTWIDTH] IN BLOOD BY AUTOMATED COUNT: 13.8 % (ref 11.9–14.6)
FERRITIN SERPL-MCNC: 12 NG/ML (ref 8–388)
GLOBULIN SER CALC-MCNC: 4.4 G/DL (ref 2.3–3.5)
GLUCOSE SERPL-MCNC: 88 MG/DL (ref 70–99)
HCT VFR BLD AUTO: 40.1 % (ref 35.8–46.3)
HGB BLD-MCNC: 12.4 G/DL (ref 11.7–15.4)
IMM GRANULOCYTES # BLD AUTO: 0 K/UL (ref 0–0.5)
IMM GRANULOCYTES NFR BLD AUTO: 0 % (ref 0–5)
IRON SATN MFR SERPL: 5 % (ref 20–50)
IRON SERPL-MCNC: 23 UG/DL (ref 35–100)
KAPPA LC FREE SER-MCNC: 34.3 MG/L (ref 2.4–20.7)
KAPPA LC FREE/LAMBDA FREE SER: 2.4 (ref 0.2–0.8)
LAMBDA LC FREE SERPL-MCNC: 14.3 MG/L (ref 4.2–27.7)
LYMPHOCYTES # BLD: 1.1 K/UL (ref 0.5–4.6)
LYMPHOCYTES NFR BLD: 29 % (ref 13–44)
MCH RBC QN AUTO: 27.7 PG (ref 26.1–32.9)
MCHC RBC AUTO-ENTMCNC: 30.9 G/DL (ref 31.4–35)
MCV RBC AUTO: 89.5 FL (ref 82–102)
MONOCYTES # BLD: 0.2 K/UL (ref 0.1–1.3)
MONOCYTES NFR BLD: 6 % (ref 4–12)
NEUTS SEG # BLD: 2.5 K/UL (ref 1.7–8.2)
NEUTS SEG NFR BLD: 63 % (ref 43–78)
NRBC # BLD: 0 K/UL (ref 0–0.2)
PLATELET # BLD AUTO: 312 K/UL (ref 150–450)
PLATELET COMMENT: ADEQUATE
PMV BLD AUTO: 9.7 FL (ref 9.4–12.3)
POTASSIUM SERPL-SCNC: 4 MMOL/L (ref 3.5–5.1)
PROT SERPL-MCNC: 8.6 G/DL (ref 6.3–8.2)
RBC # BLD AUTO: 4.48 M/UL (ref 4.05–5.2)
RBC MORPH BLD: ABNORMAL
SODIUM SERPL-SCNC: 139 MMOL/L (ref 136–145)
TIBC SERPL-MCNC: 450 UG/DL (ref 240–450)
UIBC SERPL-MCNC: 427 UG/DL (ref 112–347)
WBC # BLD AUTO: 3.8 K/UL (ref 4.3–11.1)
WBC MORPH BLD: ABNORMAL

## 2024-05-17 PROCEDURE — 84156 ASSAY OF PROTEIN URINE: CPT

## 2024-05-17 PROCEDURE — 83550 IRON BINDING TEST: CPT

## 2024-05-17 PROCEDURE — 80053 COMPREHEN METABOLIC PANEL: CPT

## 2024-05-17 PROCEDURE — 36415 COLL VENOUS BLD VENIPUNCTURE: CPT

## 2024-05-17 PROCEDURE — 83521 IG LIGHT CHAINS FREE EACH: CPT

## 2024-05-17 PROCEDURE — 84166 PROTEIN E-PHORESIS/URINE/CSF: CPT

## 2024-05-17 PROCEDURE — 82728 ASSAY OF FERRITIN: CPT

## 2024-05-17 PROCEDURE — 83540 ASSAY OF IRON: CPT

## 2024-05-17 PROCEDURE — 85025 COMPLETE CBC W/AUTO DIFF WBC: CPT

## 2024-05-21 LAB
ALBUMIN MFR UR ELPH: 25.5 %
ALPHA1 GLOB MFR UR ELPH: 4.6 %
ALPHA2 GLOB 24H MFR UR ELPH: 16 %
B-GLOBULIN 24H MFR UR ELPH: 30.2 %
GAMMA GLOB 24H MFR UR ELPH: 23.7 %
LABORATORY COMMENT REPORT: NORMAL
M PROTEIN MFR UR ELPH: NORMAL %
PROT UR-MCNC: 24.1 MG/DL

## 2024-07-08 DIAGNOSIS — R89.4 SEROLOGIC ABNORMALITY: Primary | ICD-10-CM

## 2024-07-08 DIAGNOSIS — M32.8 OTHER FORMS OF SYSTEMIC LUPUS ERYTHEMATOSUS, UNSPECIFIED ORGAN INVOLVEMENT STATUS (HCC): ICD-10-CM

## 2024-07-08 DIAGNOSIS — D50.9 IRON DEFICIENCY ANEMIA, UNSPECIFIED IRON DEFICIENCY ANEMIA TYPE: ICD-10-CM

## 2024-07-08 DIAGNOSIS — O98.712 HIV DISEASE AFFECTING PREGNANCY IN SECOND TRIMESTER: ICD-10-CM

## 2024-07-08 DIAGNOSIS — E55.9 HYPOVITAMINOSIS D: ICD-10-CM

## 2024-07-08 DIAGNOSIS — O09.523 HIGH RISK PREGNANCY, MULTIGRAVIDA OF ADVANCED MATERNAL AGE IN THIRD TRIMESTER: ICD-10-CM

## 2024-07-09 DIAGNOSIS — R89.4 SEROLOGIC ABNORMALITY: ICD-10-CM

## 2024-07-09 DIAGNOSIS — M32.8 OTHER FORMS OF SYSTEMIC LUPUS ERYTHEMATOSUS, UNSPECIFIED ORGAN INVOLVEMENT STATUS (HCC): ICD-10-CM

## 2024-07-09 DIAGNOSIS — E55.9 HYPOVITAMINOSIS D: ICD-10-CM

## 2024-07-09 DIAGNOSIS — O09.523 HIGH RISK PREGNANCY, MULTIGRAVIDA OF ADVANCED MATERNAL AGE IN THIRD TRIMESTER: ICD-10-CM

## 2024-07-09 DIAGNOSIS — D50.9 IRON DEFICIENCY ANEMIA, UNSPECIFIED IRON DEFICIENCY ANEMIA TYPE: ICD-10-CM

## 2024-07-09 DIAGNOSIS — O98.712 HIV DISEASE AFFECTING PREGNANCY IN SECOND TRIMESTER: ICD-10-CM

## 2024-07-09 LAB
ALBUMIN SERPL-MCNC: 4.3 G/DL (ref 3.5–5)
ALBUMIN/GLOB SERPL: 1 (ref 1–1.9)
ALP SERPL-CCNC: 59 U/L (ref 35–104)
ALT SERPL-CCNC: 15 U/L (ref 12–65)
ANION GAP SERPL CALC-SCNC: 10 MMOL/L (ref 9–18)
APPEARANCE UR: CLEAR
AST SERPL-CCNC: 18 U/L (ref 15–37)
BACTERIA URNS QL MICRO: NEGATIVE /HPF
BILIRUB SERPL-MCNC: 0.2 MG/DL (ref 0–1.2)
BILIRUB UR QL: NEGATIVE
BUN SERPL-MCNC: 15 MG/DL (ref 6–23)
CALCIUM SERPL-MCNC: 9.6 MG/DL (ref 8.8–10.2)
CHLORIDE SERPL-SCNC: 101 MMOL/L (ref 98–107)
CO2 SERPL-SCNC: 24 MMOL/L (ref 20–28)
COLOR UR: NORMAL
CREAT SERPL-MCNC: 0.99 MG/DL (ref 0.6–1.1)
EPI CELLS #/AREA URNS HPF: NORMAL /HPF (ref 0–5)
ERYTHROCYTE [DISTWIDTH] IN BLOOD BY AUTOMATED COUNT: 13.5 % (ref 11.9–14.6)
ERYTHROCYTE [SEDIMENTATION RATE] IN BLOOD: 30 MM/HR (ref 0–20)
GLOBULIN SER CALC-MCNC: 4.4 G/DL (ref 2.3–3.5)
GLUCOSE SERPL-MCNC: 85 MG/DL (ref 70–99)
GLUCOSE UR STRIP.AUTO-MCNC: NEGATIVE MG/DL
HCT VFR BLD AUTO: 37.1 % (ref 35.8–46.3)
HGB BLD-MCNC: 11 G/DL (ref 11.7–15.4)
HGB UR QL STRIP: NEGATIVE
HYALINE CASTS URNS QL MICRO: NORMAL /LPF
KETONES UR QL STRIP.AUTO: NEGATIVE MG/DL
LEUKOCYTE ESTERASE UR QL STRIP.AUTO: NEGATIVE
MCH RBC QN AUTO: 26.8 PG (ref 26.1–32.9)
MCHC RBC AUTO-ENTMCNC: 29.6 G/DL (ref 31.4–35)
MCV RBC AUTO: 90.3 FL (ref 82–102)
MUCOUS THREADS URNS QL MICRO: 0 /LPF
NITRITE UR QL STRIP.AUTO: NEGATIVE
NRBC # BLD: 0 K/UL (ref 0–0.2)
PH UR STRIP: 5 (ref 5–9)
PLATELET # BLD AUTO: 320 K/UL (ref 150–450)
PMV BLD AUTO: 10.2 FL (ref 9.4–12.3)
POTASSIUM SERPL-SCNC: 4.1 MMOL/L (ref 3.5–5.1)
PROT SERPL-MCNC: 8.7 G/DL (ref 6.3–8.2)
PROT UR STRIP-MCNC: NEGATIVE MG/DL
RBC # BLD AUTO: 4.11 M/UL (ref 4.05–5.2)
RBC #/AREA URNS HPF: NORMAL /HPF (ref 0–5)
SODIUM SERPL-SCNC: 136 MMOL/L (ref 136–145)
SP GR UR REFRACTOMETRY: 1.01 (ref 1–1.02)
URINE CULTURE IF INDICATED: NORMAL
UROBILINOGEN UR QL STRIP.AUTO: 0.2 EU/DL (ref 0.2–1)
WBC # BLD AUTO: 4.2 K/UL (ref 4.3–11.1)
WBC URNS QL MICRO: NORMAL /HPF (ref 0–4)

## 2024-07-10 LAB
APTT SCREEN TO CONFIRM RATIO: 1.25 RATIO (ref 0–1.34)
B2 GLYCOPROT1 IGG SER-ACNC: <9 GPI IGG UNITS (ref 0–20)
B2 GLYCOPROT1 IGM SER-ACNC: <9 GPI IGM UNITS (ref 0–32)
C3 SERPL-MCNC: 121 MG/DL (ref 82–167)
C4 SERPL-MCNC: 13 MG/DL (ref 12–38)
CCP IGA+IGG SERPL IA-ACNC: 11 UNITS (ref 0–19)
CENTROMERE B AB SER-ACNC: <0.2 AI (ref 0–0.9)
CHROMATIN AB SERPL-ACNC: <0.2 AI (ref 0–0.9)
CONFIRM APTT/NORMAL: 42.8 SEC (ref 0–47.6)
DSDNA AB SER-ACNC: 2 IU/ML (ref 0–9)
ENA JO1 AB SER-ACNC: <0.2 AI (ref 0–0.9)
ENA RNP AB SER-ACNC: 3.1 AI (ref 0–0.9)
ENA SCL70 AB SER-ACNC: <0.2 AI (ref 0–0.9)
ENA SM AB SER-ACNC: <0.2 AI (ref 0–0.9)
ENA SS-A AB SER-ACNC: 2.1 AI (ref 0–0.9)
ENA SS-B AB SER-ACNC: 7.1 AI (ref 0–0.9)
HEXAGONAL PHASE PHOSPHOLIPID: 3 SEC (ref 0–11)
LA 2 SCREEN W REFLEX-IMP: ABNORMAL
Lab: ABNORMAL
PTT-LA MIX: 41.5 SEC (ref 0–40.5)
RHEUMATOID FACT SER QL LA: NEGATIVE
SCREEN APTT: 45 SEC (ref 0–43.5)
SCREEN DRVVT: 33 SEC (ref 0–47)
THROMBIN TIME: 18.4 SEC (ref 0–23)

## 2024-07-17 NOTE — PROGRESS NOTES
time   37 minutes, greater than half of the time was spent on counseling.        Plan:     Labs - ruby, lupus panel, cbc, cmp, esr, c3, c4, UA, APLS antibody panel  Monitor if any sx -will re-send patient reading material on lupus  RTC in 12 months  - call if any issues              We discussed the expected course, resolution and complications of the diagnosis(es) in detail.  Medication risks, benefits, costs, interactions, and alternatives were discussed as indicated.  I advised her to contact the office if her condition worsens, changes or fails to improve as anticipated. She expressed understanding with the diagnosis(es) and plan.     Indy Macielbritt Grossman, was evaluated through a synchronous (real-time) audio-video encounter. The patient (or guardian if applicable) is aware that this is a billable service, which includes applicable co-pays. This Virtual Visit was conducted with patient's (and/or legal guardian's) consent. Patient identification was verified, and a caregiver was present when appropriate.   The patient was located at Home: [address] [place] [address] [address] [facility] [id number]  Provider was located at Home (Appt Dept State): [place]  Confirm you are appropriately licensed, registered, or certified to deliver care in the state where the patient is located as indicated above. If you are not or unsure, please re-schedule the visit: Yes, I confirm.      Total time spent for this encounter:  37 minutes , greater than half of the time was spent on counseling.      --Denice Galdamez MD on 7/18/2024 at 11:03 AM    An electronic signature was used to authenticate this note.     Services were provided through a video synchronous discussion virtually to substitute for in-person clinic visit.

## 2024-07-18 ENCOUNTER — TELEMEDICINE (OUTPATIENT)
Dept: RHEUMATOLOGY | Age: 49
End: 2024-07-18
Payer: OTHER GOVERNMENT

## 2024-07-18 DIAGNOSIS — O98.712 HIV DISEASE AFFECTING PREGNANCY IN SECOND TRIMESTER: ICD-10-CM

## 2024-07-18 DIAGNOSIS — D50.9 IRON DEFICIENCY ANEMIA, UNSPECIFIED IRON DEFICIENCY ANEMIA TYPE: ICD-10-CM

## 2024-07-18 DIAGNOSIS — R89.4 SEROLOGIC ABNORMALITY: Primary | ICD-10-CM

## 2024-07-18 DIAGNOSIS — M32.8 OTHER FORMS OF SYSTEMIC LUPUS ERYTHEMATOSUS, UNSPECIFIED ORGAN INVOLVEMENT STATUS (HCC): ICD-10-CM

## 2024-07-18 DIAGNOSIS — E55.9 HYPOVITAMINOSIS D: ICD-10-CM

## 2024-07-18 DIAGNOSIS — O09.523 HIGH RISK PREGNANCY, MULTIGRAVIDA OF ADVANCED MATERNAL AGE IN THIRD TRIMESTER: ICD-10-CM

## 2024-07-18 PROCEDURE — 99214 OFFICE O/P EST MOD 30 MIN: CPT | Performed by: INTERNAL MEDICINE

## 2024-07-18 NOTE — PATIENT INSTRUCTIONS
Labs - ruby, lupus panel, cbc, cmp, esr, c3, c4, UA, APLS antibody panel  Monitor if any sx -will re-send patient reading material on lupus  RTC in 12 months  - call if any issues      1year,Labs BS prior

## 2024-08-06 DIAGNOSIS — D64.9 ANEMIA, UNSPECIFIED TYPE: Primary | ICD-10-CM

## 2024-08-06 NOTE — PROGRESS NOTES
mouth daily      TRIUMEQ 600- MG TABS daily       No current facility-administered medications for this visit.     OBJECTIVE:  /75   Pulse 67   Temp 98.7 °F (37.1 °C)   Resp 18   Ht 1.702 m (5' 7\")   Wt 68.9 kg (152 lb)   SpO2 100%   BMI 23.81 kg/m²     ECOG PERFORMANCE STATUS - 0-Fully active, able to carry on all pre-disease performance without restriction.  Pain - 0 - No pain/10. None/Minimal pain - not affecting QOL     Physical Exam  Vitals reviewed.   Constitutional:       General: She is not in acute distress.     Appearance: Normal appearance. She is normal weight. She is not ill-appearing or toxic-appearing.   HENT:      Head: Normocephalic and atraumatic.      Nose: Nose normal. No congestion.      Mouth/Throat:      Mouth: Mucous membranes are moist.   Eyes:      General: No scleral icterus.     Conjunctiva/sclera: Conjunctivae normal.   Cardiovascular:      Rate and Rhythm: Normal rate and regular rhythm.      Heart sounds: No murmur heard.  Pulmonary:      Effort: Pulmonary effort is normal. No respiratory distress.      Breath sounds: Normal breath sounds. No wheezing or rales.   Abdominal:      General: There is no distension.   Musculoskeletal:         General: Normal range of motion.      Cervical back: Normal range of motion.      Right lower leg: No edema.      Left lower leg: No edema.   Skin:     General: Skin is warm and dry.      Coloration: Skin is not jaundiced or pale.      Findings: No rash.   Neurological:      General: No focal deficit present.      Mental Status: She is alert and oriented to person, place, and time.      Gait: Gait normal.   Psychiatric:         Behavior: Behavior normal.         Thought Content: Thought content normal.        Labs:  Recent Results (from the past 168 hour(s))   Protein Electrophoresis, Urine    Collection Time: 08/14/24  2:25 PM   Result Value Ref Range    Protein, Total Urine 12.9 Not Estab. mg/dL    Albumin, U 49.6 %

## 2024-08-14 ENCOUNTER — OFFICE VISIT (OUTPATIENT)
Dept: ONCOLOGY | Age: 49
End: 2024-08-14
Payer: OTHER GOVERNMENT

## 2024-08-14 ENCOUNTER — HOSPITAL ENCOUNTER (OUTPATIENT)
Dept: LAB | Age: 49
Discharge: HOME | End: 2024-08-17
Payer: OTHER GOVERNMENT

## 2024-08-14 VITALS
WEIGHT: 152 LBS | TEMPERATURE: 98.7 F | SYSTOLIC BLOOD PRESSURE: 118 MMHG | DIASTOLIC BLOOD PRESSURE: 75 MMHG | HEART RATE: 67 BPM | HEIGHT: 67 IN | RESPIRATION RATE: 18 BRPM | BODY MASS INDEX: 23.86 KG/M2 | OXYGEN SATURATION: 100 %

## 2024-08-14 DIAGNOSIS — D64.9 ANEMIA, UNSPECIFIED TYPE: ICD-10-CM

## 2024-08-14 DIAGNOSIS — D50.9 IRON DEFICIENCY ANEMIA, UNSPECIFIED IRON DEFICIENCY ANEMIA TYPE: Primary | ICD-10-CM

## 2024-08-14 LAB
ALBUMIN SERPL-MCNC: 4 G/DL (ref 3.5–5)
ALBUMIN/GLOB SERPL: 1 (ref 1–1.9)
ALP SERPL-CCNC: 63 U/L (ref 35–104)
ALT SERPL-CCNC: 13 U/L (ref 12–65)
ANION GAP SERPL CALC-SCNC: 10 MMOL/L (ref 9–18)
AST SERPL-CCNC: 17 U/L (ref 15–37)
BASOPHILS # BLD: 0.1 K/UL (ref 0–0.2)
BASOPHILS NFR BLD: 2 % (ref 0–2)
BILIRUB SERPL-MCNC: <0.2 MG/DL (ref 0–1.2)
BUN SERPL-MCNC: 9 MG/DL (ref 6–23)
CALCIUM SERPL-MCNC: 9 MG/DL (ref 8.8–10.2)
CHLORIDE SERPL-SCNC: 104 MMOL/L (ref 98–107)
CO2 SERPL-SCNC: 24 MMOL/L (ref 20–28)
CREAT SERPL-MCNC: 0.91 MG/DL (ref 0.6–1.1)
DIFFERENTIAL METHOD BLD: ABNORMAL
EOSINOPHIL # BLD: 0 K/UL (ref 0–0.8)
EOSINOPHIL NFR BLD: 1 % (ref 0.5–7.8)
ERYTHROCYTE [DISTWIDTH] IN BLOOD BY AUTOMATED COUNT: 13.2 % (ref 11.9–14.6)
FERRITIN SERPL-MCNC: 7 NG/ML (ref 8–388)
GLOBULIN SER CALC-MCNC: 4 G/DL (ref 2.3–3.5)
GLUCOSE SERPL-MCNC: 90 MG/DL (ref 70–99)
HCT VFR BLD AUTO: 32.7 % (ref 35.8–46.3)
HGB BLD-MCNC: 9.9 G/DL (ref 11.7–15.4)
IMM GRANULOCYTES # BLD AUTO: 0 K/UL (ref 0–0.5)
IMM GRANULOCYTES NFR BLD AUTO: 0 % (ref 0–5)
IRON SATN MFR SERPL: 40 % (ref 20–50)
IRON SERPL-MCNC: 150 UG/DL (ref 35–100)
KAPPA LC FREE SER-MCNC: 30.1 MG/L (ref 2.4–20.7)
KAPPA LC FREE/LAMBDA FREE SER: 2.4 (ref 0.2–0.8)
LAMBDA LC FREE SERPL-MCNC: 12.7 MG/L (ref 4.2–27.7)
LYMPHOCYTES # BLD: 1.3 K/UL (ref 0.5–4.6)
LYMPHOCYTES NFR BLD: 41 % (ref 13–44)
MCH RBC QN AUTO: 26.5 PG (ref 26.1–32.9)
MCHC RBC AUTO-ENTMCNC: 30.3 G/DL (ref 31.4–35)
MCV RBC AUTO: 87.4 FL (ref 82–102)
MONOCYTES # BLD: 0.2 K/UL (ref 0.1–1.3)
MONOCYTES NFR BLD: 7 % (ref 4–12)
NEUTS SEG # BLD: 1.5 K/UL (ref 1.7–8.2)
NEUTS SEG NFR BLD: 49 % (ref 43–78)
NRBC # BLD: 0 K/UL (ref 0–0.2)
PLATELET # BLD AUTO: 272 K/UL (ref 150–450)
PMV BLD AUTO: 9.7 FL (ref 9.4–12.3)
POTASSIUM SERPL-SCNC: 3.8 MMOL/L (ref 3.5–5.1)
PROT SERPL-MCNC: 8 G/DL (ref 6.3–8.2)
RBC # BLD AUTO: 3.74 M/UL (ref 4.05–5.2)
SODIUM SERPL-SCNC: 138 MMOL/L (ref 136–145)
TIBC SERPL-MCNC: 379 UG/DL (ref 240–450)
UIBC SERPL-MCNC: 229 UG/DL (ref 112–347)
WBC # BLD AUTO: 3.1 K/UL (ref 4.3–11.1)

## 2024-08-14 PROCEDURE — 84165 PROTEIN E-PHORESIS SERUM: CPT

## 2024-08-14 PROCEDURE — 86334 IMMUNOFIX E-PHORESIS SERUM: CPT

## 2024-08-14 PROCEDURE — 99214 OFFICE O/P EST MOD 30 MIN: CPT | Performed by: INTERNAL MEDICINE

## 2024-08-14 PROCEDURE — 82728 ASSAY OF FERRITIN: CPT

## 2024-08-14 PROCEDURE — 84156 ASSAY OF PROTEIN URINE: CPT

## 2024-08-14 PROCEDURE — 82784 ASSAY IGA/IGD/IGG/IGM EACH: CPT

## 2024-08-14 PROCEDURE — 84166 PROTEIN E-PHORESIS/URINE/CSF: CPT

## 2024-08-14 PROCEDURE — 80053 COMPREHEN METABOLIC PANEL: CPT

## 2024-08-14 PROCEDURE — 36415 COLL VENOUS BLD VENIPUNCTURE: CPT

## 2024-08-14 PROCEDURE — 83550 IRON BINDING TEST: CPT

## 2024-08-14 PROCEDURE — 85025 COMPLETE CBC W/AUTO DIFF WBC: CPT

## 2024-08-14 PROCEDURE — 83521 IG LIGHT CHAINS FREE EACH: CPT

## 2024-08-14 PROCEDURE — 83540 ASSAY OF IRON: CPT

## 2024-08-14 ASSESSMENT — PATIENT HEALTH QUESTIONNAIRE - PHQ9
2. FEELING DOWN, DEPRESSED OR HOPELESS: NOT AT ALL
1. LITTLE INTEREST OR PLEASURE IN DOING THINGS: NOT AT ALL
SUM OF ALL RESPONSES TO PHQ QUESTIONS 1-9: 0
SUM OF ALL RESPONSES TO PHQ9 QUESTIONS 1 & 2: 0
SUM OF ALL RESPONSES TO PHQ QUESTIONS 1-9: 0

## 2024-08-14 NOTE — PATIENT INSTRUCTIONS
Patient Information from Today's Visit    Diagnosis: Iron Deficiency Anemia      Follow Up Instructions: In a couple of months to recheck labs.    Labs reviewed.  Symptoms reviewed.  You have declined IV iron so we recommend restarting oral iron.  Recommend taking oral iron on Mondays, Wednesdays, and Fridays on an empty stomach with a source of Vitamin C (orange/orange juice).  This supplement is found over the counter.    Treatment Summary has been discussed and given to patient: N/A      Current Labs:   Hospital Outpatient Visit on 08/14/2024   Component Date Value Ref Range Status    KAPPA FREE LIGHT CHAIN 08/14/2024 30.1 (H)  2.4 - 20.7 mg/L Final    LAMBDA FREE LIGHT CHAIN 08/14/2024 12.7  4.2 - 27.7 mg/L Final    Kappa/Lambda Fluid C Ratio 08/14/2024 2.4 (H)  0.2 - 0.8   Final    Iron 08/14/2024 150 (H)  35 - 100 ug/dL Final    TIBC 08/14/2024 379  240 - 450 ug/dL Final    Iron % Saturation 08/14/2024 40  20 - 50 % Final    UIBC 08/14/2024 229.0  112.0 - 347.0 ug/dL Final    Ferritin 08/14/2024 7 (L)  8 - 388 NG/ML Final    Sodium 08/14/2024 138  136 - 145 mmol/L Final    Potassium 08/14/2024 3.8  3.5 - 5.1 mmol/L Final    Chloride 08/14/2024 104  98 - 107 mmol/L Final    CO2 08/14/2024 24  20 - 28 mmol/L Final    Anion Gap 08/14/2024 10  9 - 18 mmol/L Final    Glucose 08/14/2024 90  70 - 99 mg/dL Final    Comment: <70 mg/dL Consistent with, but not fully diagnostic of hypoglycemia.  100 - 125 mg/dL Impaired fasting glucose/consistent with pre-diabetes mellitus.  > 126 mg/dl Fasting glucose consistent with overt diabetes mellitus      BUN 08/14/2024 9  6 - 23 MG/DL Final    Creatinine 08/14/2024 0.91  0.60 - 1.10 MG/DL Final    Est, Glom Filt Rate 08/14/2024 78  >60 ml/min/1.73m2 Final    Comment:    Pediatric calculator link: https://www.kidney.org/professionals/kdoqi/gfr_calculatorped     These results are not intended for use in patients <18 years of age.     eGFR results are calculated without a race

## 2024-08-16 LAB
ALBUMIN MFR UR ELPH: 49.6 %
ALPHA1 GLOB MFR UR ELPH: 3.6 %
ALPHA2 GLOB 24H MFR UR ELPH: 8 %
B-GLOBULIN 24H MFR UR ELPH: 24.3 %
GAMMA GLOB 24H MFR UR ELPH: 14.5 %
LABORATORY COMMENT REPORT: NORMAL
M PROTEIN MFR UR ELPH: NORMAL %
PROT UR-MCNC: 12.9 MG/DL

## 2024-08-20 LAB
ALBUMIN SERPL ELPH-MCNC: 4.1 G/DL (ref 2.9–4.4)
ALBUMIN/GLOB SERPL: 1.2 (ref 0.7–1.7)
ALPHA1 GLOB SERPL ELPH-MCNC: 0.2 G/DL (ref 0–0.4)
ALPHA2 GLOB SERPL ELPH-MCNC: 0.5 G/DL (ref 0.4–1)
B-GLOBULIN SERPL ELPH-MCNC: 1 G/DL (ref 0.7–1.3)
GAMMA GLOB SERPL ELPH-MCNC: 1.9 G/DL (ref 0.4–1.8)
GLOBULIN SER-MCNC: 3.6 G/DL (ref 2.2–3.9)
IGA SERPL-MCNC: 470 MG/DL (ref 87–352)
IGG SERPL-MCNC: 2016 MG/DL (ref 586–1602)
IGM SERPL-MCNC: 114 MG/DL (ref 26–217)
INTERPRETATION SERPL IEP-IMP: ABNORMAL
M PROTEIN SERPL ELPH-MCNC: ABNORMAL G/DL
PROT SERPL-MCNC: 7.7 G/DL (ref 6–8.5)

## 2024-10-23 ENCOUNTER — HOSPITAL ENCOUNTER (OUTPATIENT)
Dept: LAB | Age: 49
Discharge: HOME | End: 2024-10-23
Payer: OTHER GOVERNMENT

## 2024-10-23 DIAGNOSIS — D50.9 IRON DEFICIENCY ANEMIA, UNSPECIFIED IRON DEFICIENCY ANEMIA TYPE: ICD-10-CM

## 2024-10-23 LAB
ALBUMIN SERPL-MCNC: 4 G/DL (ref 3.5–5)
ALBUMIN/GLOB SERPL: 0.9 (ref 1–1.9)
ALP SERPL-CCNC: 59 U/L (ref 35–104)
ALT SERPL-CCNC: 10 U/L (ref 8–45)
ANION GAP SERPL CALC-SCNC: 9 MMOL/L (ref 9–18)
AST SERPL-CCNC: 15 U/L (ref 15–37)
BASOPHILS # BLD: 0 K/UL (ref 0–0.2)
BASOPHILS NFR BLD: 1 % (ref 0–2)
BILIRUB SERPL-MCNC: 0.4 MG/DL (ref 0–1.2)
BUN SERPL-MCNC: 8 MG/DL (ref 6–23)
CALCIUM SERPL-MCNC: 9.2 MG/DL (ref 8.8–10.2)
CHLORIDE SERPL-SCNC: 106 MMOL/L (ref 98–107)
CO2 SERPL-SCNC: 23 MMOL/L (ref 20–28)
CREAT SERPL-MCNC: 1.01 MG/DL (ref 0.6–1.1)
DIFFERENTIAL METHOD BLD: ABNORMAL
EOSINOPHIL # BLD: 0 K/UL (ref 0–0.8)
EOSINOPHIL NFR BLD: 1 % (ref 0.5–7.8)
ERYTHROCYTE [DISTWIDTH] IN BLOOD BY AUTOMATED COUNT: 14.2 % (ref 11.9–14.6)
FERRITIN SERPL-MCNC: 11 NG/ML (ref 8–388)
GLOBULIN SER CALC-MCNC: 4.5 G/DL (ref 2.3–3.5)
GLUCOSE SERPL-MCNC: 88 MG/DL (ref 70–99)
HCT VFR BLD AUTO: 38.4 % (ref 35.8–46.3)
HGB BLD-MCNC: 11.5 G/DL (ref 11.7–15.4)
IMM GRANULOCYTES # BLD AUTO: 0 K/UL (ref 0–0.5)
IMM GRANULOCYTES NFR BLD AUTO: 0 % (ref 0–5)
IRON SATN MFR SERPL: 5 % (ref 20–50)
IRON SERPL-MCNC: 20 UG/DL (ref 35–100)
LYMPHOCYTES # BLD: 1.1 K/UL (ref 0.5–4.6)
LYMPHOCYTES NFR BLD: 36 % (ref 13–44)
MCH RBC QN AUTO: 26.1 PG (ref 26.1–32.9)
MCHC RBC AUTO-ENTMCNC: 29.9 G/DL (ref 31.4–35)
MCV RBC AUTO: 87.3 FL (ref 82–102)
MONOCYTES # BLD: 0.2 K/UL (ref 0.1–1.3)
MONOCYTES NFR BLD: 7 % (ref 4–12)
NEUTS SEG # BLD: 1.7 K/UL (ref 1.7–8.2)
NEUTS SEG NFR BLD: 55 % (ref 43–78)
NRBC # BLD: 0 K/UL (ref 0–0.2)
PLATELET # BLD AUTO: 258 K/UL (ref 150–450)
PMV BLD AUTO: 9.4 FL (ref 9.4–12.3)
POTASSIUM SERPL-SCNC: 3.7 MMOL/L (ref 3.5–5.1)
PROT SERPL-MCNC: 8.6 G/DL (ref 6.3–8.2)
RBC # BLD AUTO: 4.4 M/UL (ref 4.05–5.2)
SODIUM SERPL-SCNC: 138 MMOL/L (ref 136–145)
TIBC SERPL-MCNC: 393 UG/DL (ref 240–450)
UIBC SERPL-MCNC: 373 UG/DL (ref 112–347)
WBC # BLD AUTO: 3.1 K/UL (ref 4.3–11.1)

## 2024-10-23 PROCEDURE — 36415 COLL VENOUS BLD VENIPUNCTURE: CPT

## 2024-10-23 PROCEDURE — 80053 COMPREHEN METABOLIC PANEL: CPT

## 2024-10-23 PROCEDURE — 85025 COMPLETE CBC W/AUTO DIFF WBC: CPT

## 2024-10-23 PROCEDURE — 83540 ASSAY OF IRON: CPT

## 2024-10-23 PROCEDURE — 83550 IRON BINDING TEST: CPT

## 2024-10-23 PROCEDURE — 82728 ASSAY OF FERRITIN: CPT

## 2024-10-30 ENCOUNTER — OFFICE VISIT (OUTPATIENT)
Dept: ONCOLOGY | Age: 49
End: 2024-10-30
Payer: OTHER GOVERNMENT

## 2024-10-30 VITALS
WEIGHT: 152 LBS | SYSTOLIC BLOOD PRESSURE: 123 MMHG | HEIGHT: 67 IN | HEART RATE: 77 BPM | OXYGEN SATURATION: 100 % | RESPIRATION RATE: 16 BRPM | DIASTOLIC BLOOD PRESSURE: 76 MMHG | BODY MASS INDEX: 23.86 KG/M2 | TEMPERATURE: 99.1 F

## 2024-10-30 DIAGNOSIS — E55.9 VITAMIN D DEFICIENCY: ICD-10-CM

## 2024-10-30 DIAGNOSIS — D50.9 IRON DEFICIENCY ANEMIA, UNSPECIFIED IRON DEFICIENCY ANEMIA TYPE: ICD-10-CM

## 2024-10-30 DIAGNOSIS — R77.9 ELEVATED BLOOD PROTEIN: ICD-10-CM

## 2024-10-30 DIAGNOSIS — D64.9 ANEMIA, UNSPECIFIED TYPE: Primary | ICD-10-CM

## 2024-10-30 PROCEDURE — 99213 OFFICE O/P EST LOW 20 MIN: CPT

## 2024-10-30 ASSESSMENT — PATIENT HEALTH QUESTIONNAIRE - PHQ9
SUM OF ALL RESPONSES TO PHQ QUESTIONS 1-9: 0
SUM OF ALL RESPONSES TO PHQ9 QUESTIONS 1 & 2: 0
SUM OF ALL RESPONSES TO PHQ QUESTIONS 1-9: 0
2. FEELING DOWN, DEPRESSED OR HOPELESS: NOT AT ALL
1. LITTLE INTEREST OR PLEASURE IN DOING THINGS: NOT AT ALL

## 2024-11-06 ASSESSMENT — ENCOUNTER SYMPTOMS
BLOOD IN STOOL: 0
ABDOMINAL PAIN: 0
SCLERAL ICTERUS: 0
WHEEZING: 0
ABDOMINAL DISTENTION: 0
NAUSEA: 0
HEMOPTYSIS: 0
SORE THROAT: 0
CHEST TIGHTNESS: 0
VOMITING: 0
CONSTIPATION: 0
DIARRHEA: 0
VOICE CHANGE: 0
TROUBLE SWALLOWING: 0
SHORTNESS OF BREATH: 0

## 2024-11-06 NOTE — PROGRESS NOTES
[facility] Hematology and Oncology: Established patient - follow up     Chief Complaint   Patient presents with    Follow-up     Reason for Referral: Iron deficiency anemia, unspecified iron deficiency anemia type  Referring Provider: Kisha Crawford DO  Primary Care Provider: Dulce Herbert MD  Family History of Cancer/Hematologic Disorders: Family history is significant for paternal grandfather with prostate cancer.  Presenting Symptoms: No relevant physical symptoms documented    History of Present Illness:  Ms. Grossman is a 49 y.o. AA female who presents today for follow up regarding anemia in pregnancy.  The past medical history is significant for ?systemic lupus erythematosus (pt stated that she had abnormal labs but not diagnosed), hyperthyroidism, HIV, abnormal Pap smear of cervix,  section x 2, tubal cautery, myomectomy, and tubal reversal, who is 20w 0d pregnant with an EDC of 22.  She presented to [facility] on 22 for new OB consult.  She had embryo transfer with donor egg on 22. Routine prenatal labs drawn the same day were significant for WBC 4.1, RBC 4.04, HGB 9.0, HCT 31.4, MCV 77.7, MCH 22.3, MCHC 28.7, and RDW 16.7. Hemoglobinopathy evaluation was WNL with normal hemoglobin present and not hemoglobin variant or beta thalassemia identified. On 22, patient was started on 325 mg of ferrous sulfate BID in addition to PNV with iron.  Patient is also followed by [facility] as pregnancy is high risk secondary to AMA, HIV, anemia, C/S X2, fibroids, and +covid in pregnancy.  Patient was diagnosed with HIV in .  She is managed by [facility] every 6 months.  Last appointment viral load per patient was undetectable. She is taking Epzicom, Prezista, and Norvir daily.  Labs on 22 showed a drop in HGB to 8.4. Iron panel was significant for iron 24, TIBC 468, and transferrin saturation 5. Ferritin was WNL at 11.  Additional labs drawn on

## 2024-11-13 ENCOUNTER — OFFICE VISIT (OUTPATIENT)
Age: 49
End: 2024-11-13

## 2024-11-13 VITALS
DIASTOLIC BLOOD PRESSURE: 70 MMHG | TEMPERATURE: 98.1 F | OXYGEN SATURATION: 99 % | HEART RATE: 74 BPM | RESPIRATION RATE: 16 BRPM | SYSTOLIC BLOOD PRESSURE: 124 MMHG

## 2024-11-13 DIAGNOSIS — J32.9 RHINOSINUSITIS: Primary | ICD-10-CM

## 2024-11-13 PROBLEM — O99.013 ANEMIA AFFECTING PREGNANCY IN THIRD TRIMESTER: Status: RESOLVED | Noted: 2018-03-28 | Resolved: 2024-11-13

## 2024-11-13 PROBLEM — O98.511 COVID-19 AFFECTING PREGNANCY IN FIRST TRIMESTER: Status: RESOLVED | Noted: 2022-06-16 | Resolved: 2024-11-13

## 2024-11-13 PROBLEM — H52.4 HYPEROPIA OF BOTH EYES WITH REGULAR ASTIGMATISM AND PRESBYOPIA: Status: ACTIVE | Noted: 2021-02-26

## 2024-11-13 PROBLEM — M32.9 SYSTEMIC LUPUS (HCC): Status: ACTIVE | Noted: 2022-08-09

## 2024-11-13 PROBLEM — O09.523 HIGH RISK PREGNANCY, MULTIGRAVIDA OF ADVANCED MATERNAL AGE IN THIRD TRIMESTER: Status: RESOLVED | Noted: 2022-06-08 | Resolved: 2024-11-13

## 2024-11-13 PROBLEM — O34.219 HISTORY OF CESAREAN SECTION COMPLICATING PREGNANCY: Status: RESOLVED | Noted: 2022-06-08 | Resolved: 2024-11-13

## 2024-11-13 PROBLEM — U07.1 COVID-19 AFFECTING PREGNANCY IN FIRST TRIMESTER: Status: RESOLVED | Noted: 2022-06-16 | Resolved: 2024-11-13

## 2024-11-13 PROBLEM — Z34.83 MULTIGRAVIDA IN THIRD TRIMESTER: Status: RESOLVED | Noted: 2022-12-08 | Resolved: 2024-11-13

## 2024-11-13 PROBLEM — E05.90 HYPERTHYROIDISM: Status: ACTIVE | Noted: 2018-03-28

## 2024-11-13 PROBLEM — H52.223 HYPEROPIA OF BOTH EYES WITH REGULAR ASTIGMATISM AND PRESBYOPIA: Status: ACTIVE | Noted: 2021-02-26

## 2024-11-13 RX ORDER — SODIUM FLUORIDE 5 MG/ML
PASTE, DENTIFRICE DENTAL
COMMUNITY

## 2024-11-13 RX ORDER — NORGESTIMATE AND ETHINYL ESTRADIOL 0.25-0.035
KIT ORAL
COMMUNITY

## 2024-11-13 ASSESSMENT — ENCOUNTER SYMPTOMS
CHEST TIGHTNESS: 0
EYE ITCHING: 0
VOMITING: 0
EYE DISCHARGE: 0
ABDOMINAL PAIN: 0
EYE REDNESS: 0
DIARRHEA: 0
SINUS PRESSURE: 1
COUGH: 0
RHINORRHEA: 0
HOARSE VOICE: 0
NAUSEA: 0
SHORTNESS OF BREATH: 0
SINUS PAIN: 1
SWOLLEN GLANDS: 0
EYE PAIN: 0
SORE THROAT: 0

## 2025-01-22 ENCOUNTER — PATIENT MESSAGE (OUTPATIENT)
Dept: ONCOLOGY | Age: 50
End: 2025-01-22

## 2025-01-23 NOTE — PROGRESS NOTES
[facility] Hematology and Oncology: Established patient - follow up     Chief Complaint   Patient presents with    Follow-up     Reason for Referral: Iron deficiency anemia, unspecified iron deficiency anemia type  Referring Provider: Kisha Crawford DO  Primary Care Provider: Dulce Herbert MD  Family History of Cancer/Hematologic Disorders: Family history is significant for paternal grandfather with prostate cancer.  Presenting Symptoms: No relevant physical symptoms documented    History of Present Illness:  Ms. Grossman is a 49 y.o. AA female who presents today for follow up regarding anemia in pregnancy.  The past medical history is significant for ?systemic lupus erythematosus (pt stated that she had abnormal labs but not diagnosed), hyperthyroidism, HIV, abnormal Pap smear of cervix,  section x 2, tubal cautery, myomectomy, and tubal reversal, who is 20w 0d pregnant with an EDC of 22.  She presented to [facility] on 22 for new OB consult.  She had embryo transfer with donor egg on 22. Routine prenatal labs drawn the same day were significant for WBC 4.1, RBC 4.04, HGB 9.0, HCT 31.4, MCV 77.7, MCH 22.3, MCHC 28.7, and RDW 16.7. Hemoglobinopathy evaluation was WNL with normal hemoglobin present and not hemoglobin variant or beta thalassemia identified. On 22, patient was started on 325 mg of ferrous sulfate BID in addition to PNV with iron.  Patient is also followed by [facility] as pregnancy is high risk secondary to AMA, HIV, anemia, C/S X2, fibroids, and +covid in pregnancy.  Patient was diagnosed with HIV in .  She is managed by [facility] every 6 months.  Last appointment viral load per patient was undetectable. She is taking Epzicom, Prezista, and Norvir daily.  Labs on 22 showed a drop in HGB to 8.4. Iron panel was significant for iron 24, TIBC 468, and transferrin saturation 5. Ferritin was WNL at 11.  Additional labs drawn on

## 2025-02-03 ENCOUNTER — OFFICE VISIT (OUTPATIENT)
Dept: ONCOLOGY | Age: 50
End: 2025-02-03

## 2025-02-03 ENCOUNTER — HOSPITAL ENCOUNTER (OUTPATIENT)
Dept: LAB | Age: 50
Discharge: HOME | End: 2025-02-03

## 2025-02-03 ENCOUNTER — OFFICE VISIT (OUTPATIENT)
Age: 50
End: 2025-02-03

## 2025-02-03 VITALS
OXYGEN SATURATION: 100 % | DIASTOLIC BLOOD PRESSURE: 72 MMHG | HEIGHT: 67 IN | HEART RATE: 73 BPM | SYSTOLIC BLOOD PRESSURE: 113 MMHG | BODY MASS INDEX: 23.49 KG/M2 | WEIGHT: 149.7 LBS | RESPIRATION RATE: 13 BRPM | TEMPERATURE: 98 F

## 2025-02-03 VITALS
HEART RATE: 71 BPM | OXYGEN SATURATION: 99 % | TEMPERATURE: 98.2 F | DIASTOLIC BLOOD PRESSURE: 90 MMHG | RESPIRATION RATE: 16 BRPM | SYSTOLIC BLOOD PRESSURE: 138 MMHG

## 2025-02-03 DIAGNOSIS — J32.9 RHINOSINUSITIS: Primary | ICD-10-CM

## 2025-02-03 DIAGNOSIS — D50.9 IRON DEFICIENCY ANEMIA, UNSPECIFIED IRON DEFICIENCY ANEMIA TYPE: ICD-10-CM

## 2025-02-03 DIAGNOSIS — D50.9 IRON DEFICIENCY ANEMIA, UNSPECIFIED IRON DEFICIENCY ANEMIA TYPE: Primary | ICD-10-CM

## 2025-02-03 LAB
BASOPHILS # BLD: 0.03 K/UL (ref 0–0.2)
BASOPHILS NFR BLD: 1 % (ref 0–2)
DIFFERENTIAL METHOD BLD: ABNORMAL
EOSINOPHIL # BLD: 0.09 K/UL (ref 0–0.8)
EOSINOPHIL NFR BLD: 3 % (ref 0.5–7.8)
ERYTHROCYTE [DISTWIDTH] IN BLOOD BY AUTOMATED COUNT: 14.4 % (ref 11.9–14.6)
FERRITIN SERPL-MCNC: 38 NG/ML (ref 8–388)
HCT VFR BLD AUTO: 38 % (ref 35.8–46.3)
HGB BLD-MCNC: 11.7 G/DL (ref 11.7–15.4)
IMM GRANULOCYTES # BLD AUTO: 0 K/UL (ref 0–0.5)
IMM GRANULOCYTES NFR BLD AUTO: 0 % (ref 0–5)
INFLUENZA A ANTIGEN, POC: NEGATIVE
INFLUENZA B ANTIGEN, POC: NEGATIVE
IRON SATN MFR SERPL: 5 % (ref 20–50)
IRON SERPL-MCNC: 16 UG/DL (ref 35–100)
LOT EXPIRE DATE: NORMAL
LOT KIT NUMBER: NORMAL
LYMPHOCYTES # BLD: 1.04 K/UL (ref 0.5–4.6)
LYMPHOCYTES NFR BLD: 34.6 % (ref 13–44)
MCH RBC QN AUTO: 28.2 PG (ref 26.1–32.9)
MCHC RBC AUTO-ENTMCNC: 30.8 G/DL (ref 31.4–35)
MCV RBC AUTO: 91.6 FL (ref 82–102)
MONOCYTES # BLD: 0.27 K/UL (ref 0.1–1.3)
MONOCYTES NFR BLD: 9 % (ref 4–12)
NEUTS SEG # BLD: 1.58 K/UL (ref 1.7–8.2)
NEUTS SEG NFR BLD: 52.4 % (ref 43–78)
NRBC # BLD: 0 K/UL (ref 0–0.2)
PLATELET # BLD AUTO: 265 K/UL (ref 150–450)
PMV BLD AUTO: 10.2 FL (ref 9.4–12.3)
RBC # BLD AUTO: 4.15 M/UL (ref 4.05–5.2)
SARS-COV-2 RNA, POC: NEGATIVE
TIBC SERPL-MCNC: 326 UG/DL (ref 240–450)
UIBC SERPL-MCNC: 310 UG/DL (ref 112–347)
VALID INTERNAL CONTROL: YES
VENDOR AND KIT NAME POC: NORMAL
WBC # BLD AUTO: 3 K/UL (ref 4.3–11.1)

## 2025-02-03 PROCEDURE — 82728 ASSAY OF FERRITIN: CPT

## 2025-02-03 PROCEDURE — 83540 ASSAY OF IRON: CPT

## 2025-02-03 PROCEDURE — 85025 COMPLETE CBC W/AUTO DIFF WBC: CPT

## 2025-02-03 PROCEDURE — 99213 OFFICE O/P EST LOW 20 MIN: CPT | Performed by: INTERNAL MEDICINE

## 2025-02-03 PROCEDURE — 36415 COLL VENOUS BLD VENIPUNCTURE: CPT

## 2025-02-03 PROCEDURE — 83550 IRON BINDING TEST: CPT

## 2025-02-03 ASSESSMENT — ENCOUNTER SYMPTOMS
COUGH: 1
WHEEZING: 0
SORE THROAT: 0
SINUS PRESSURE: 1
DIARRHEA: 0
VOMITING: 0
EYE REDNESS: 0
EYE DISCHARGE: 0
RHINORRHEA: 0
ABDOMINAL PAIN: 0
EYE PAIN: 0
SINUS PAIN: 0
SWOLLEN GLANDS: 0
EYE ITCHING: 0
NAUSEA: 0

## 2025-02-03 ASSESSMENT — PATIENT HEALTH QUESTIONNAIRE - PHQ9
SUM OF ALL RESPONSES TO PHQ QUESTIONS 1-9: 0
SUM OF ALL RESPONSES TO PHQ9 QUESTIONS 1 & 2: 0
2. FEELING DOWN, DEPRESSED OR HOPELESS: NOT AT ALL
1. LITTLE INTEREST OR PLEASURE IN DOING THINGS: NOT AT ALL

## 2025-02-03 NOTE — PROGRESS NOTES
PROGRESS NOTE    SUBJECTIVE:   Indy Grossman is a 49 y.o. female [facility] for cold symptoms that started 4 days ago. Reports middle child was sick prior to her symptoms starting    Chief Complaint    Cold Symptoms           History provided by:  Patient   used: No    Cold Symptoms   This is a new problem. Episode onset: 4 days. The problem has been waxing and waning. There has been no fever. Associated symptoms include congestion, coughing and a plugged ear sensation (right ear). Pertinent negatives include no abdominal pain, chest pain, diarrhea, dysuria, ear pain, headaches, joint pain, joint swelling, nausea, neck pain, rash, rhinorrhea, sinus pain, sneezing, sore throat, swollen glands, vomiting or wheezing. She has tried sleep (tylenol cold and flu, ginna pot, flonase, afrin) for the symptoms. The treatment provided mild relief.       Current Outpatient Medications   Medication Sig Dispense Refill    Multiple Vitamin (MULTIVITAMIN ADULT PO) Take 1 tablet by mouth daily      SODIUM FLUORIDE, DENTAL GEL, (SODIUM FLUORIDE 5000 PLUS) 1.1 % CREA USE IN PLACE OF REGULAR TOOTHPASTE TWICE DAILY      norgestimate-ethinyl estradiol (ORTHO-CYCLEN) 0.25-35 MG-MCG per tablet TAKE 1 ACTIVE TABLET BY MOUTH CONTINUOUSLY      ferrous sulfate (IRON 325) 325 (65 Fe) MG tablet Take 1 tablet by mouth three times a week      Biotin w/ Vitamins C & E (HAIR/SKIN/NAILS PO) Take by mouth daily      TRIUMEQ 600- MG TABS daily       No current facility-administered medications for this visit.      No Known Allergies    Social History     Tobacco Use    Smoking status: Never     Passive exposure: Never    Smokeless tobacco: Never   Vaping Use    Vaping status: Never Used   Substance Use Topics    Alcohol use: Yes     Alcohol/week: 1.0 standard drink of alcohol     Types: 1 Glasses of wine per week     Comment: Once a month

## 2025-02-04 NOTE — PATIENT INSTRUCTIONS
Patient Information from Today's Visit    Diagnosis: Anemia      Follow Up Instructions: 2 months.    Labs reviewed.  Symptoms reviewed.  Recommend doubling up on oral iron with Vitamin C source on Mondays, Wednesdays, and Fridays.    Treatment Summary has been discussed and given to patient: N/A      Current Labs:   Hospital Outpatient Visit on 02/03/2025   Component Date Value Ref Range Status    WBC 02/03/2025 3.0 (L)  4.3 - 11.1 K/uL Final    RBC 02/03/2025 4.15  4.05 - 5.2 M/uL Final    Hemoglobin 02/03/2025 11.7  11.7 - 15.4 g/dL Final    Hematocrit 02/03/2025 38.0  35.8 - 46.3 % Final    MCV 02/03/2025 91.6  82.0 - 102.0 FL Final    MCH 02/03/2025 28.2  26.1 - 32.9 PG Final    MCHC 02/03/2025 30.8 (L)  31.4 - 35.0 g/dL Final    RDW 02/03/2025 14.4  11.9 - 14.6 % Final    Platelets 02/03/2025 265  150 - 450 K/uL Final    MPV 02/03/2025 10.2  9.4 - 12.3 FL Final    nRBC 02/03/2025 0.00  0.0 - 0.2 K/uL Final    **Note: Absolute NRBC parameter is now reported with Hemogram**    Neutrophils % 02/03/2025 52.4  43.0 - 78.0 % Final    Lymphocytes % 02/03/2025 34.6  13.0 - 44.0 % Final    Monocytes % 02/03/2025 9.0  4.0 - 12.0 % Final    Eosinophils % 02/03/2025 3.0  0.5 - 7.8 % Final    Basophils % 02/03/2025 1.0  0.0 - 2.0 % Final    Immature Granulocytes % 02/03/2025 0.0  0.0 - 5.0 % Final    Neutrophils Absolute 02/03/2025 1.58 (L)  1.70 - 8.20 K/UL Final    Lymphocytes Absolute 02/03/2025 1.04  0.50 - 4.60 K/UL Final    Monocytes Absolute 02/03/2025 0.27  0.10 - 1.30 K/UL Final    Eosinophils Absolute 02/03/2025 0.09  0.00 - 0.80 K/UL Final    Basophils Absolute 02/03/2025 0.03  0.00 - 0.20 K/UL Final    Immature Granulocytes Absolute 02/03/2025 0.00  0.00 - 0.50 K/UL Final    Differential Type 02/03/2025 AUTOMATED    Final    Ferritin 02/03/2025 38  8 - 388 NG/ML Final    Iron 02/03/2025 16 (L)  35 - 100 ug/dL Final    TIBC 02/03/2025 326  240 - 450 ug/dL Final    Iron % Saturation 02/03/2025 5 (L)  20 - 50 %

## 2025-04-30 ENCOUNTER — OFFICE VISIT (OUTPATIENT)
Dept: OBGYN CLINIC | Age: 50
End: 2025-04-30
Payer: OTHER GOVERNMENT

## 2025-04-30 ENCOUNTER — PREP FOR PROCEDURE (OUTPATIENT)
Dept: OBGYN CLINIC | Age: 50
End: 2025-04-30

## 2025-04-30 VITALS
DIASTOLIC BLOOD PRESSURE: 82 MMHG | WEIGHT: 150 LBS | SYSTOLIC BLOOD PRESSURE: 134 MMHG | BODY MASS INDEX: 23.54 KG/M2 | HEIGHT: 67 IN

## 2025-04-30 DIAGNOSIS — M62.08 DIASTASIS RECTI: ICD-10-CM

## 2025-04-30 DIAGNOSIS — D50.0 CHRONIC BLOOD LOSS ANEMIA: ICD-10-CM

## 2025-04-30 DIAGNOSIS — Z86.018 HISTORY OF UTERINE FIBROID: ICD-10-CM

## 2025-04-30 DIAGNOSIS — N92.0 MENORRHAGIA WITH REGULAR CYCLE: Primary | ICD-10-CM

## 2025-04-30 DIAGNOSIS — Z12.31 ENCOUNTER FOR SCREENING MAMMOGRAM FOR MALIGNANT NEOPLASM OF BREAST: ICD-10-CM

## 2025-04-30 DIAGNOSIS — M62.89 PELVIC FLOOR DYSFUNCTION: ICD-10-CM

## 2025-04-30 PROCEDURE — 99214 OFFICE O/P EST MOD 30 MIN: CPT | Performed by: OBSTETRICS & GYNECOLOGY

## 2025-04-30 RX ORDER — DICLOFENAC SODIUM 75 MG/1
75 TABLET, DELAYED RELEASE ORAL
COMMUNITY
Start: 2025-04-08

## 2025-04-30 NOTE — PROGRESS NOTES
CC:   Chief Complaint   Patient presents with    Consultation     Discuss surgery - Leni         HPI:    Indy  is a 49 y.o. , , No LMP recorded.,  who is seen for surgery consult to discuss treatment for HMB.    2025 Hb wnl, but has been following with hematology for chronic anemia  Hx of CS x 3  Hx of LSC myomectomy    + HIV, well controlled with ND VL  2024 negative cotest   Last imaging when she was not pregnant was .     Current Outpatient Medications on File Prior to Visit   Medication Sig Dispense Refill    diclofenac (VOLTAREN) 75 MG EC tablet Take 1 tablet by mouth      Multiple Vitamin (MULTIVITAMIN ADULT PO) Take 1 tablet by mouth daily      SODIUM FLUORIDE, DENTAL GEL, (SODIUM FLUORIDE 5000 PLUS) 1.1 % CREA USE IN PLACE OF REGULAR TOOTHPASTE TWICE DAILY      norgestimate-ethinyl estradiol (ORTHO-CYCLEN) 0.25-35 MG-MCG per tablet TAKE 1 ACTIVE TABLET BY MOUTH CONTINUOUSLY      ferrous sulfate (IRON 325) 325 (65 Fe) MG tablet Take 1 tablet by mouth three times a week      Biotin w/ Vitamins C & E (HAIR/SKIN/NAILS PO) Take by mouth daily      TRIUMEQ 600- MG TABS daily       No current facility-administered medications on file prior to visit.         Past Medical History:   Diagnosis Date    Abnormal Pap smear of cervix     Acute left-sided low back pain without sciatica 2021    Onset  at least 2 weeks ago. Reports intermittent pain that can last for at least 3 hours. Denies any trauma or injury. No leg weakness or swelling. She tried taking Motrin to help with the pain. No abdominal pain or nausea. Noticed the pain occurred after being intimate  with her spouse. Will order a urinalysis and follow up pending results. Prescribe a trial of Flexeril 10 mg at bedtime and Volt    Anemia     prescribed iron to take daily     Anemia, iron deficiency 2014    Formatting of this note might be different from the original.  - noted to be Fe deficient w/ anemia, reporting

## 2025-05-08 NOTE — PERIOP NOTE
Patient verified name and .  Order for consent  was not found in EHR; patient verifies procedure.   Type 1B surgery, PHONE assessment complete.  Orders NOT received.  Labs per surgeon: No orders received.   Labs per anesthesia protocol: Hgb; order signed and held in EHR.     Patient instructed to come to outpatient lab, [place] [address] [address], any weekday, prior to surgery, between 0800 and 1500 to have lab work completed. Patient verbalized understanding.      Patient answered medical/surgical history questions at their best of ability. All prior to admission medications documented in EPIC.    Patient instructed to continue taking all prescription medications up to the day of surgery but to take only the following medications the day of surgery according to anesthesia guidelines with a small sip of water: NONE Also, patient is requested to take 2 Tylenol in the morning and then again before bed on the day before surgery. Regular or extra strength may be used.       Patient informed that all vitamins and supplements should be held 7 days prior to surgery and NSAIDS/Diclofenac 5 days prior to surgery.     Patient instructed to continue iron supplement up until the DOS per anesthesia protocol.     Patient instructed on the following:    > Arrive at [facility] Entr[facility], time of arrival to be called the day before by 1700  > No food after midnight, patient may drink clear liquids up until 2 hours prior to arrival. No gum, candy, mints.   > Responsible adult must drive patient to the hospital, stay during surgery, and patient will need supervision 24 hours after anesthesia  > Use non moisturizing soap in shower the night before surgery and on the morning of surgery  > All piercings must be removed prior to arrival.    > Leave all valuables (money and jewelry) at home but bring insurance card and ID on DOS.   > You may be required to pay a deductible or co-pay on the day of your procedure. You can pre-pay by calling

## 2025-05-10 ENCOUNTER — HOSPITAL ENCOUNTER (OUTPATIENT)
Dept: MAMMOGRAPHY | Age: 50
Discharge: HOME | End: 2025-05-13
Attending: OBSTETRICS & GYNECOLOGY
Payer: OTHER GOVERNMENT

## 2025-05-10 VITALS — WEIGHT: 150 LBS | BODY MASS INDEX: 23.54 KG/M2 | HEIGHT: 67 IN

## 2025-05-10 DIAGNOSIS — Z12.31 ENCOUNTER FOR SCREENING MAMMOGRAM FOR MALIGNANT NEOPLASM OF BREAST: ICD-10-CM

## 2025-05-10 PROCEDURE — 77063 BREAST TOMOSYNTHESIS BI: CPT

## 2025-05-12 ENCOUNTER — OFFICE VISIT (OUTPATIENT)
Dept: OBGYN CLINIC | Age: 50
End: 2025-05-12
Payer: OTHER GOVERNMENT

## 2025-05-12 ENCOUNTER — PROCEDURE VISIT (OUTPATIENT)
Dept: OBGYN CLINIC | Age: 50
End: 2025-05-12
Payer: OTHER GOVERNMENT

## 2025-05-12 VITALS
SYSTOLIC BLOOD PRESSURE: 134 MMHG | DIASTOLIC BLOOD PRESSURE: 82 MMHG | HEIGHT: 67 IN | WEIGHT: 150 LBS | BODY MASS INDEX: 23.54 KG/M2

## 2025-05-12 DIAGNOSIS — Z01.419 WELL WOMAN EXAM WITH ROUTINE GYNECOLOGICAL EXAM: Primary | ICD-10-CM

## 2025-05-12 DIAGNOSIS — Z11.51 SCREENING FOR HPV (HUMAN PAPILLOMAVIRUS): ICD-10-CM

## 2025-05-12 DIAGNOSIS — N92.0 MENORRHAGIA WITH REGULAR CYCLE: ICD-10-CM

## 2025-05-12 DIAGNOSIS — D50.0 CHRONIC BLOOD LOSS ANEMIA: Primary | ICD-10-CM

## 2025-05-12 DIAGNOSIS — Z12.4 PAP SMEAR FOR CERVICAL CANCER SCREENING: ICD-10-CM

## 2025-05-12 DIAGNOSIS — Z86.018 HISTORY OF UTERINE FIBROID: ICD-10-CM

## 2025-05-12 PROCEDURE — 99396 PREV VISIT EST AGE 40-64: CPT | Performed by: OBSTETRICS & GYNECOLOGY

## 2025-05-12 PROCEDURE — 76830 TRANSVAGINAL US NON-OB: CPT | Performed by: OBSTETRICS & GYNECOLOGY

## 2025-05-12 PROCEDURE — 99459 PELVIC EXAMINATION: CPT | Performed by: OBSTETRICS & GYNECOLOGY

## 2025-05-12 RX ORDER — SODIUM CHLORIDE 0.9 % (FLUSH) 0.9 %
5-40 SYRINGE (ML) INJECTION PRN
OUTPATIENT
Start: 2025-05-12

## 2025-05-12 RX ORDER — SODIUM CHLORIDE 9 MG/ML
INJECTION, SOLUTION INTRAVENOUS PRN
OUTPATIENT
Start: 2025-05-12

## 2025-05-12 RX ORDER — SODIUM CHLORIDE 0.9 % (FLUSH) 0.9 %
5-40 SYRINGE (ML) INJECTION EVERY 12 HOURS SCHEDULED
OUTPATIENT
Start: 2025-05-12

## 2025-05-12 NOTE — PROGRESS NOTES
CC:  Annual     HPI:  49 y.o.   presents today for a routine gynecological examination. Patient's last menstrual period was 2025.    PCP: Yes    Contraception:  Condoms    Menses:  Regular, heavy   Sexually active   No changes in sexual partners --declines STD testing       Referral placed to PFPT at last visit     GYN hx:  As per HPI    Last Pap smear result: 2024 negative     Last MGM 2025 RESULT PENDING   Last Colonoscopy Has had within last 5 years, but reports it was inadequate. Is suppose to schedule FU.     US today with 200 cc uterus. 3 small fibroids none of which are subserosal    OB Hx:     OB History    Para Term  AB Living   5 3 3  1 2   SAB IAB Ectopic Molar Multiple Live Births   1     3      # Outcome Date GA Lbr Luis E/2nd Weight Sex Type Anes PTL Lv   5 2019 4w0d             Birth Comments: IVF pregnancy after transferring three D3 embryos   4 Term 16 38w0d  3.504 kg (7 lb 11.6 oz) F CS-LTranv Spinal N RAFY   3 Term 06 39w0d  3.374 kg (7 lb 7 oz) M CS-Unspec Spinal N RAFY      Birth Comments: Arrest of dilation @ 9cm    2 Term            1                   PMH:  Past Medical History:   Diagnosis Date    Abnormal Pap smear of cervix     Acute left-sided low back pain without sciatica 2021    Onset  at least 2 weeks ago. Reports intermittent pain that can last for at least 3 hours. Denies any trauma or injury. No leg weakness or swelling. She tried taking Motrin to help with the pain. No abdominal pain or nausea. Noticed the pain occurred after being intimate  with her spouse. Will order a urinalysis and follow up pending results. Prescribe a trial of Flexeril 10 mg at bedtime and Volt    Anemia     prescribed iron to take daily     Anemia, iron deficiency 2014    Formatting of this note might be different from the original.  - noted to be Fe deficient w/ anemia, reporting menorrhagia 2014 fe sat = 6% hgb = 8.9 2014 h/h = -

## 2025-05-12 NOTE — H&P
Gynecology Major Surgery History and Physical    Indy Grossman  [id number]    Subjective:      Chief complaint:  KAMALA Putnam is a 49 y.o. here for preop due to HMB.      2/2025 Hb wnl, but has been following with hematology for chronic anemia  Hx of CS x 3  Hx of LSC myomectomy     + HIV, well controlled with ND VL  2/2024 negative cotest   Last imaging when she was not pregnant was 2022.    US today   200 cc uterus. 3 small fibroids none of which are subserosal    Past Medical History:   Diagnosis Date    Abnormal Pap smear of cervix     Acute left-sided low back pain without sciatica 05/19/2021    Onset  at least 2 weeks ago. Reports intermittent pain that can last for at least 3 hours. Denies any trauma or injury. No leg weakness or swelling. She tried taking Motrin to help with the pain. No abdominal pain or nausea. Noticed the pain occurred after being intimate  with her spouse. Will order a urinalysis and follow up pending results. Prescribe a trial of Flexeril 10 mg at bedtime and Volt    Anemia     prescribed iron to take daily     Anemia, iron deficiency 02/27/2014    Formatting of this note might be different from the original. 2009 - noted to be Fe deficient w/ anemia, reporting menorrhagia 2014-02 fe sat = 6% hgb = 8.9 2014-05 h/h = 13/41- resolved  History: has had difficulty with anemia 2nd to heavy menses. Is not taking Fe at this time. [] chronic, untreated -- will discuss need for Fe at next visit -- check Fe panel at next visit    Autoimmune disorder     HIV disease (HCC) 2003    Hyperthyroidism     No meds for several years    Infection due to yeast 8/30/2019    Complains of a vaginal discharge and feeling like there is a clump of something in her vagina.  She does have some itching.  She relates she has a discharge frequently after she has intercourse with her  whether he uses a condom or not.  We treated her for BV in February of this year and her symptoms resolved at that

## 2025-05-14 ENCOUNTER — HOSPITAL ENCOUNTER (OUTPATIENT)
Dept: PHYSICAL THERAPY | Age: 50
Setting detail: RECURRING SERIES
Discharge: HOME | End: 2025-05-17
Attending: OBSTETRICS & GYNECOLOGY
Payer: OTHER GOVERNMENT

## 2025-05-14 DIAGNOSIS — M62.81 MUSCLE WEAKNESS (GENERALIZED): ICD-10-CM

## 2025-05-14 DIAGNOSIS — M62.08 SEPARATION OF MUSCLE (NONTRAUMATIC), OTHER SITE: Primary | ICD-10-CM

## 2025-05-14 PROCEDURE — 97110 THERAPEUTIC EXERCISES: CPT

## 2025-05-14 PROCEDURE — 97161 PT EVAL LOW COMPLEX 20 MIN: CPT

## 2025-05-14 ASSESSMENT — PAIN SCALES - GENERAL: PAINLEVEL_OUTOF10: 0

## 2025-05-14 NOTE — THERAPY EVALUATION
Indywesley Grossman  : 1975  Primary:  [facility] (Other)  Secondary:  [place] [address]  [address]  [facility] [address]  Phone: [phone]  Fax: [phone] Plan Frequency: once every other week for  90 days    Plan of Care/Certification Expiration Date: 25        Plan of Care/Certification Expiration Date:  Plan of Care/Certification Expiration Date: 25    Frequency/Duration: Plan Frequency: once every other week for  90 days      Time In/Out:   Time In: 1451  Time Out: 1536      PT Visit Info:    Plan Frequency: once every other week for  90 days  Total # of Visits Approved: 0 (med nec)  Total # of Visits to Date: 1      Visit Count:  1                OUTPATIENT PHYSICAL THERAPY:             Initial Assessment 2025               Episode (PFPT)         Treatment Diagnosis:     Separation of muscle (nontraumatic), other site  Muscle weakness (generalized)  Medical/Referring Diagnosis:    Pelvic floor dysfunction [M62.89]  Diastasis recti [M62.08]    Referring Physician:  Kisha Crawford DO MD Orders:  PT Eval and Treat   Return MD Appt:  25  Date of Onset:  Onset Date:  (chronic)  Allergies:  Patient has no known allergies.  Restrictions/Precautions:    None      Medications Last Reviewed:  2025     SUBJECTIVE   History of Injury/Illness (Reason for Referral):  Ms. Grossman is a 50 yo female referred to pelvic floor physical therapy (PFPT) by Kisha Crawford DO 2/2 Pelvic floor dysfunction [M62.89]  Diastasis recti [M62.08].    Pt presents to PT with concerns around her abdominal wall. She has had 3 c-sections in the past (,  and ). She has an abdominal shelf due to scar tissue. She also has a small diastasis. She does not experience pain in the abdomen ut is concerned that this may worsen.     Previous medical management includes nothing  Current medical management includes nothing    She does not

## 2025-05-15 LAB
COLLECTION METHOD: NORMAL
CYTOLOGIST CVX/VAG CYTO: NORMAL
CYTOLOGY CVX/VAG DOC THIN PREP: NORMAL
DATE OF LMP: NORMAL
HPV APTIMA: NEGATIVE
HPV GENOTYPE REFLEX: NORMAL
Lab: NORMAL
PAP SOURCE: NORMAL
PATH REPORT.FINAL DX SPEC: NORMAL
STAT OF ADQ CVX/VAG CYTO-IMP: NORMAL

## 2025-05-15 NOTE — PROGRESS NOTES
Indy Grossman  : 1975  Primary:  [facility] (Other)  Secondary:  [place] [address]  [address]  [facility] [address]  Phone: [phone]  Fax: [phone] Plan Frequency: once every other week for  90 days    Plan of Care/Certification Expiration Date: 25        Plan of Care/Certification Expiration Date:  Plan of Care/Certification Expiration Date: 25    Frequency/Duration: Plan Frequency: once every other week for  90 days      Time In/Out:   Time In: 1451  Time Out: 1536      PT Visit Info:    Total # of Visits Approved: 0 (med nec)  Total # of Visits to Date: 1      Visit Count:  1    OUTPATIENT PHYSICAL THERAPY:   Treatment Note 2025       Episode  (PFPT)               Treatment Diagnosis:    Separation of muscle (nontraumatic), other site  Muscle weakness (generalized)  Medical/Referring Diagnosis:    Pelvic floor dysfunction [M62.89]  Diastasis recti [M62.08]    Referring Physician:  Kisha Crawford DO MD Orders:  PT Eval and Treat   Return MD Appt:  25   Date of Onset:  Onset Date:  (chronic)  Allergies:   Patient has no known allergies.  Restrictions/Precautions:   None      Interventions Planned (Treatment may consist of any combination of the following):     See Assessment Note    Subjective Comments:   Diastasis recti and abdominal shelf  Initial Pain Level::     0/10  Post Session Pain Level:       0/10  Medications Last Reviewed:  2025  Updated Objective Findings:  See Evaluation Note from today  Treatment   THERAPEUTIC EXERCISE: (15 minutes): Exercises per grid below to improve mobility, strength, and coordination.  Required moderate verbal and tactile cues to promote proper body breathing techniques.  Progressed range and repetitions as indicated.   Date:  5/15/25 Date:   Date:     Activity/Exercise Parameters Parameters Parameters   HEP TA brace w/ head lift 10x 5-10s 2x/day   scar massage

## 2025-05-16 ENCOUNTER — HOSPITAL ENCOUNTER (OUTPATIENT)
Dept: LAB | Age: 50
Discharge: HOME | End: 2025-05-19
Payer: OTHER GOVERNMENT

## 2025-05-16 DIAGNOSIS — Z01.818 PRE-OP TESTING: ICD-10-CM

## 2025-05-16 LAB — HGB BLD-MCNC: 11.2 G/DL (ref 11.7–15.4)

## 2025-05-16 PROCEDURE — 85018 HEMOGLOBIN: CPT

## 2025-05-19 ENCOUNTER — RESULTS FOLLOW-UP (OUTPATIENT)
Dept: OBGYN CLINIC | Age: 50
End: 2025-05-19

## 2025-05-22 ENCOUNTER — ANESTHESIA EVENT (OUTPATIENT)
Dept: SURGERY | Age: 50
End: 2025-05-22
Payer: OTHER GOVERNMENT

## 2025-05-22 RX ORDER — SODIUM CHLORIDE 0.9 % (FLUSH) 0.9 %
5-40 SYRINGE (ML) INJECTION PRN
Status: CANCELLED | OUTPATIENT
Start: 2025-05-22

## 2025-05-22 RX ORDER — SODIUM CHLORIDE 0.9 % (FLUSH) 0.9 %
5-40 SYRINGE (ML) INJECTION EVERY 12 HOURS SCHEDULED
Status: CANCELLED | OUTPATIENT
Start: 2025-05-22

## 2025-05-22 NOTE — ANESTHESIA PRE PROCEDURE
Department of Anesthesiology  Preprocedure Note       Name:  Indy Grossman   Age:  49 y.o.  :  1975                                          MRN:  [id number]         Date:  2025      Surgeon: Surgeon(s):  Kisha Crawford DO    Procedure: Procedure(s):  HYSTEROSCOPY DILATATION CURETTAGE WITH NOVASURE ENDOMETRIAL ABLATION    Medications prior to admission:   Prior to Admission medications    Medication Sig Start Date End Date Taking? Authorizing Provider   diclofenac (VOLTAREN) 75 MG EC tablet Take 1 tablet by mouth 25  Yes Keven Charlton MD   Multiple Vitamin (MULTIVITAMIN ADULT PO) Take 1 tablet by mouth daily   Yes Keven Charlton MD   ferrous sulfate (IRON 325) 325 (65 Fe) MG tablet Take 1 tablet by mouth three times a week   Yes Keven Charlton MD   Biotin w/ Vitamins C & E (HAIR/SKIN/NAILS PO) Take by mouth daily   Yes Keven Charlton MD   TRIUMEQ 600- MG TABS Take 1 tablet by mouth every evening 4/3/23  Yes Keven Charlton MD   SODIUM FLUORIDE, DENTAL GEL, (SODIUM FLUORIDE 5000 PLUS) 1.1 % CREA USE IN PLACE OF REGULAR TOOTHPASTE TWICE DAILY    ProviderKeven MD       Current medications:    No current facility-administered medications for this encounter.     Current Outpatient Medications   Medication Sig Dispense Refill   • diclofenac (VOLTAREN) 75 MG EC tablet Take 1 tablet by mouth     • Multiple Vitamin (MULTIVITAMIN ADULT PO) Take 1 tablet by mouth daily     • ferrous sulfate (IRON 325) 325 (65 Fe) MG tablet Take 1 tablet by mouth three times a week     • Biotin w/ Vitamins C & E (HAIR/SKIN/NAILS PO) Take by mouth daily     • TRIUMEQ 600- MG TABS Take 1 tablet by mouth every evening     • SODIUM FLUORIDE, DENTAL GEL, (SODIUM FLUORIDE 5000 PLUS) 1.1 % CREA USE IN PLACE OF REGULAR TOOTHPASTE TWICE DAILY         Allergies:  No Known Allergies    Problem List:    Patient Active Problem List   Diagnosis Code   • History of

## 2025-05-23 ENCOUNTER — ANESTHESIA (OUTPATIENT)
Dept: SURGERY | Age: 50
End: 2025-05-23
Payer: OTHER GOVERNMENT

## 2025-05-23 ENCOUNTER — HOSPITAL ENCOUNTER (OUTPATIENT)
Age: 50
Setting detail: OUTPATIENT SURGERY
Discharge: HOME | End: 2025-05-23
Attending: OBSTETRICS & GYNECOLOGY | Admitting: OBSTETRICS & GYNECOLOGY
Payer: OTHER GOVERNMENT

## 2025-05-23 VITALS
OXYGEN SATURATION: 100 % | BODY MASS INDEX: 23.54 KG/M2 | TEMPERATURE: 98.1 F | HEART RATE: 51 BPM | DIASTOLIC BLOOD PRESSURE: 78 MMHG | HEIGHT: 67 IN | RESPIRATION RATE: 14 BRPM | SYSTOLIC BLOOD PRESSURE: 130 MMHG | WEIGHT: 150 LBS

## 2025-05-23 DIAGNOSIS — Z01.818 PRE-OP TESTING: Primary | ICD-10-CM

## 2025-05-23 PROBLEM — N93.9 ABNORMAL UTERINE BLEEDING (AUB): Status: ACTIVE | Noted: 2025-05-23

## 2025-05-23 LAB — HCG UR QL: NEGATIVE

## 2025-05-23 PROCEDURE — 7100000000 HC PACU RECOVERY - FIRST 15 MIN: Performed by: OBSTETRICS & GYNECOLOGY

## 2025-05-23 PROCEDURE — 7100000011 HC PHASE II RECOVERY - ADDTL 15 MIN: Performed by: OBSTETRICS & GYNECOLOGY

## 2025-05-23 PROCEDURE — 6360000002 HC RX W HCPCS

## 2025-05-23 PROCEDURE — 7100000001 HC PACU RECOVERY - ADDTL 15 MIN: Performed by: OBSTETRICS & GYNECOLOGY

## 2025-05-23 PROCEDURE — 3600000014 HC SURGERY LEVEL 4 ADDTL 15MIN: Performed by: OBSTETRICS & GYNECOLOGY

## 2025-05-23 PROCEDURE — 2709999900 HC NON-CHARGEABLE SUPPLY: Performed by: OBSTETRICS & GYNECOLOGY

## 2025-05-23 PROCEDURE — 3700000000 HC ANESTHESIA ATTENDED CARE: Performed by: OBSTETRICS & GYNECOLOGY

## 2025-05-23 PROCEDURE — 2580000003 HC RX 258: Performed by: ANESTHESIOLOGY

## 2025-05-23 PROCEDURE — 6370000000 HC RX 637 (ALT 250 FOR IP): Performed by: ANESTHESIOLOGY

## 2025-05-23 PROCEDURE — 2500000003 HC RX 250 WO HCPCS

## 2025-05-23 PROCEDURE — 81025 URINE PREGNANCY TEST: CPT

## 2025-05-23 PROCEDURE — 3600000004 HC SURGERY LEVEL 4 BASE: Performed by: OBSTETRICS & GYNECOLOGY

## 2025-05-23 PROCEDURE — 2720000010 HC SURG SUPPLY STERILE: Performed by: OBSTETRICS & GYNECOLOGY

## 2025-05-23 PROCEDURE — 7100000010 HC PHASE II RECOVERY - FIRST 15 MIN: Performed by: OBSTETRICS & GYNECOLOGY

## 2025-05-23 PROCEDURE — 3700000001 HC ADD 15 MINUTES (ANESTHESIA): Performed by: OBSTETRICS & GYNECOLOGY

## 2025-05-23 RX ORDER — SODIUM CHLORIDE 9 MG/ML
INJECTION, SOLUTION INTRAVENOUS PRN
Status: DISCONTINUED | OUTPATIENT
Start: 2025-05-23 | End: 2025-05-23 | Stop reason: HOSPADM

## 2025-05-23 RX ORDER — SODIUM CHLORIDE 0.9 % (FLUSH) 0.9 %
5-40 SYRINGE (ML) INJECTION EVERY 12 HOURS SCHEDULED
Status: DISCONTINUED | OUTPATIENT
Start: 2025-05-23 | End: 2025-05-23 | Stop reason: HOSPADM

## 2025-05-23 RX ORDER — DEXTROSE MONOHYDRATE 100 MG/ML
INJECTION, SOLUTION INTRAVENOUS CONTINUOUS PRN
Status: DISCONTINUED | OUTPATIENT
Start: 2025-05-23 | End: 2025-05-23 | Stop reason: HOSPADM

## 2025-05-23 RX ORDER — OXYCODONE HYDROCHLORIDE 5 MG/1
5 TABLET ORAL
Status: DISCONTINUED | OUTPATIENT
Start: 2025-05-23 | End: 2025-05-23 | Stop reason: HOSPADM

## 2025-05-23 RX ORDER — FENTANYL CITRATE 50 UG/ML
INJECTION, SOLUTION INTRAMUSCULAR; INTRAVENOUS
Status: DISCONTINUED | OUTPATIENT
Start: 2025-05-23 | End: 2025-05-23 | Stop reason: SDUPTHER

## 2025-05-23 RX ORDER — IBUPROFEN 600 MG/1
1 TABLET ORAL PRN
Status: DISCONTINUED | OUTPATIENT
Start: 2025-05-23 | End: 2025-05-23 | Stop reason: HOSPADM

## 2025-05-23 RX ORDER — MIDAZOLAM HYDROCHLORIDE 1 MG/ML
INJECTION, SOLUTION INTRAMUSCULAR; INTRAVENOUS
Status: DISCONTINUED | OUTPATIENT
Start: 2025-05-23 | End: 2025-05-23 | Stop reason: SDUPTHER

## 2025-05-23 RX ORDER — MIDAZOLAM HYDROCHLORIDE 2 MG/2ML
2 INJECTION, SOLUTION INTRAMUSCULAR; INTRAVENOUS
Status: DISCONTINUED | OUTPATIENT
Start: 2025-05-23 | End: 2025-05-23 | Stop reason: HOSPADM

## 2025-05-23 RX ORDER — LIDOCAINE HYDROCHLORIDE 20 MG/ML
INJECTION, SOLUTION EPIDURAL; INFILTRATION; INTRACAUDAL; PERINEURAL
Status: DISCONTINUED | OUTPATIENT
Start: 2025-05-23 | End: 2025-05-23 | Stop reason: SDUPTHER

## 2025-05-23 RX ORDER — ONDANSETRON 2 MG/ML
4 INJECTION INTRAMUSCULAR; INTRAVENOUS
Status: DISCONTINUED | OUTPATIENT
Start: 2025-05-23 | End: 2025-05-23 | Stop reason: HOSPADM

## 2025-05-23 RX ORDER — NALOXONE HYDROCHLORIDE 0.4 MG/ML
INJECTION, SOLUTION INTRAMUSCULAR; INTRAVENOUS; SUBCUTANEOUS PRN
Status: DISCONTINUED | OUTPATIENT
Start: 2025-05-23 | End: 2025-05-23 | Stop reason: HOSPADM

## 2025-05-23 RX ORDER — SODIUM CHLORIDE, SODIUM LACTATE, POTASSIUM CHLORIDE, CALCIUM CHLORIDE 600; 310; 30; 20 MG/100ML; MG/100ML; MG/100ML; MG/100ML
INJECTION, SOLUTION INTRAVENOUS CONTINUOUS
Status: DISCONTINUED | OUTPATIENT
Start: 2025-05-23 | End: 2025-05-23 | Stop reason: HOSPADM

## 2025-05-23 RX ORDER — SODIUM CHLORIDE 0.9 % (FLUSH) 0.9 %
5-40 SYRINGE (ML) INJECTION PRN
Status: DISCONTINUED | OUTPATIENT
Start: 2025-05-23 | End: 2025-05-23 | Stop reason: HOSPADM

## 2025-05-23 RX ORDER — PROCHLORPERAZINE EDISYLATE 5 MG/ML
5 INJECTION INTRAMUSCULAR; INTRAVENOUS
Status: DISCONTINUED | OUTPATIENT
Start: 2025-05-23 | End: 2025-05-23 | Stop reason: HOSPADM

## 2025-05-23 RX ORDER — ACETAMINOPHEN 500 MG
1000 TABLET ORAL ONCE
Status: COMPLETED | OUTPATIENT
Start: 2025-05-23 | End: 2025-05-23

## 2025-05-23 RX ORDER — LIDOCAINE HYDROCHLORIDE 10 MG/ML
1 INJECTION, SOLUTION INFILTRATION; PERINEURAL
Status: DISCONTINUED | OUTPATIENT
Start: 2025-05-23 | End: 2025-05-23 | Stop reason: HOSPADM

## 2025-05-23 RX ORDER — PROPOFOL 10 MG/ML
INJECTION, EMULSION INTRAVENOUS
Status: DISCONTINUED | OUTPATIENT
Start: 2025-05-23 | End: 2025-05-23 | Stop reason: SDUPTHER

## 2025-05-23 RX ORDER — KETAMINE HCL IN NACL, ISO-OSM 20 MG/2 ML
SYRINGE (ML) INJECTION
Status: DISCONTINUED | OUTPATIENT
Start: 2025-05-23 | End: 2025-05-23 | Stop reason: SDUPTHER

## 2025-05-23 RX ORDER — KETOROLAC TROMETHAMINE 30 MG/ML
INJECTION, SOLUTION INTRAMUSCULAR; INTRAVENOUS
Status: DISCONTINUED | OUTPATIENT
Start: 2025-05-23 | End: 2025-05-23 | Stop reason: SDUPTHER

## 2025-05-23 RX ORDER — FENTANYL CITRATE 50 UG/ML
25 INJECTION, SOLUTION INTRAMUSCULAR; INTRAVENOUS EVERY 5 MIN PRN
Status: DISCONTINUED | OUTPATIENT
Start: 2025-05-23 | End: 2025-05-23 | Stop reason: HOSPADM

## 2025-05-23 RX ORDER — MEPERIDINE HYDROCHLORIDE 50 MG/ML
12.5 INJECTION INTRAMUSCULAR; INTRAVENOUS; SUBCUTANEOUS EVERY 5 MIN PRN
Status: DISCONTINUED | OUTPATIENT
Start: 2025-05-23 | End: 2025-05-23 | Stop reason: HOSPADM

## 2025-05-23 RX ORDER — IBUPROFEN 800 MG/1
800 TABLET, FILM COATED ORAL 2 TIMES DAILY PRN
Qty: 180 TABLET | Refills: 1 | Status: SHIPPED | OUTPATIENT
Start: 2025-05-23

## 2025-05-23 RX ORDER — DIPHENHYDRAMINE HYDROCHLORIDE 50 MG/ML
12.5 INJECTION, SOLUTION INTRAMUSCULAR; INTRAVENOUS
Status: DISCONTINUED | OUTPATIENT
Start: 2025-05-23 | End: 2025-05-23 | Stop reason: HOSPADM

## 2025-05-23 RX ORDER — ONDANSETRON 2 MG/ML
INJECTION INTRAMUSCULAR; INTRAVENOUS
Status: DISCONTINUED | OUTPATIENT
Start: 2025-05-23 | End: 2025-05-23 | Stop reason: SDUPTHER

## 2025-05-23 RX ORDER — DEXAMETHASONE SODIUM PHOSPHATE 10 MG/ML
INJECTION, SOLUTION INTRA-ARTICULAR; INTRALESIONAL; INTRAMUSCULAR; INTRAVENOUS; SOFT TISSUE
Status: DISCONTINUED | OUTPATIENT
Start: 2025-05-23 | End: 2025-05-23 | Stop reason: SDUPTHER

## 2025-05-23 RX ADMIN — PROPOFOL 140 MG: 10 INJECTION, EMULSION INTRAVENOUS at 08:40

## 2025-05-23 RX ADMIN — ONDANSETRON 4 MG: 2 INJECTION, SOLUTION INTRAMUSCULAR; INTRAVENOUS at 09:08

## 2025-05-23 RX ADMIN — LIDOCAINE HYDROCHLORIDE 60 MG: 20 INJECTION, SOLUTION EPIDURAL; INFILTRATION; INTRACAUDAL; PERINEURAL at 08:40

## 2025-05-23 RX ADMIN — KETOROLAC TROMETHAMINE 30 MG: 30 INJECTION, SOLUTION INTRAMUSCULAR at 09:08

## 2025-05-23 RX ADMIN — FENTANYL CITRATE 50 MCG: 50 INJECTION, SOLUTION INTRAMUSCULAR; INTRAVENOUS at 08:54

## 2025-05-23 RX ADMIN — DEXAMETHASONE SODIUM PHOSPHATE 10 MG: 10 INJECTION INTRAMUSCULAR; INTRAVENOUS at 08:55

## 2025-05-23 RX ADMIN — FENTANYL CITRATE 25 MCG: 50 INJECTION, SOLUTION INTRAMUSCULAR; INTRAVENOUS at 08:40

## 2025-05-23 RX ADMIN — MIDAZOLAM 2 MG: 1 INJECTION INTRAMUSCULAR; INTRAVENOUS at 08:37

## 2025-05-23 RX ADMIN — Medication 20 MG: at 08:40

## 2025-05-23 RX ADMIN — ACETAMINOPHEN 1000 MG: 500 TABLET, FILM COATED ORAL at 07:52

## 2025-05-23 RX ADMIN — SODIUM CHLORIDE, SODIUM LACTATE, POTASSIUM CHLORIDE, AND CALCIUM CHLORIDE: .6; .31; .03; .02 INJECTION, SOLUTION INTRAVENOUS at 07:52

## 2025-05-23 ASSESSMENT — PAIN SCALES - GENERAL
PAINLEVEL_OUTOF10: 2

## 2025-05-23 ASSESSMENT — PAIN - FUNCTIONAL ASSESSMENT: PAIN_FUNCTIONAL_ASSESSMENT: 0-10

## 2025-05-23 NOTE — ANESTHESIA PROCEDURE NOTES
Airway  Date/Time: 5/23/2025 8:42 AM  Urgency: elective    Airway not difficult    General Information and Staff    Patient location during procedure: OR  Resident/CRNA: Iliana Rainey APRN - CRNA  Performed: resident/CRNA/CAA   Performed by: Iliana Rainey APRN - CRNA  Authorized by: uMrtaza Day MD      Indications and Patient Condition  Indications for airway management: anesthesia  Spontaneous Ventilation: absent  Sedation level: deep  Preoxygenated: yes  Patient position: sniffing  MILS not maintained throughout  Mask difficulty assessment: vent by bag mask    Final Airway Details  Final airway type: supraglottic airway      Successful airway: oropharyngeal  Size 4     Number of attempts at approach: 1

## 2025-05-23 NOTE — OP NOTE
Indy Buffy Grossman  1975      Preop Dx:   1. HMB    2. Iron deficiency anemia     Postop Dx:   Same    Procedure:   1. Hysteroscopy D&C    2. Novasure endometrial ablation    Surgeon:  Yvette    Anesthesia:  LMA    EBL:  minimal     Complications:  None    Findings:    Cavity Length: 4 cm   Cavity Width: 2.5   Power 55 min   Burn time 2 min    Procedure:  Patient was taken to the operating room where general anesthesia was found to be adequate.  She was prepped and draped in the usual sterile fashion and placed in the lithotomy position in Jose boots.  Weighted speculum was placed in patient's vagina and anterior lip of cervix grasped with a single tooth tenaculum.  Uterus was sounded to 9 cm. Cervix was sequentially dilated.  Hysteroscope was introduced without difficulty and findings as above. Hysteroscope removed. Sharp curettage was performed. NovaSure introduced and ablation performed without difficulty in usual fashion.  Hysteroscope re-introduced and cautery effect found throughout the endometrial cavity. All instruments removed from the patient's vagina and hemostasis was assured throughout.  Patient tolerated procedure well.  Sponge, lap and needle counts correct x 3.    Disposition:  Pt to RR in stable condition    Pathology: Endometrial currettings.       Kisha Crawford DO   9:29 AM  05/23/25

## 2025-05-23 NOTE — ANESTHESIA POSTPROCEDURE EVALUATION
Department of Anesthesiology  Postprocedure Note    Patient: Indy Grossman  MRN: [id number]  YOB: 1975  Date of evaluation: 5/23/2025    Procedure Summary       Date: 05/23/25 [place] [facility]  [place] [facility]    Anesthesia Start: 0829 Anesthesia Stop: 0924    Procedure: HYSTEROSCOPY DILATATION CURETTAGE WITH NOVASURE ENDOMETRIAL ABLATION (Vagina ) Diagnosis:       Heavy periods      Chronic blood loss anemia      (Heavy periods [N92.0])      (Chronic blood loss anemia [D50.0])    Surgeons: Kisha Crawford DO Responsible Provider: Murtaza Day MD    Anesthesia Type: General ASA Status: 2            Anesthesia Type: General    Jose Luis Phase I: Jose Luis Score: 10    Jose Luis Phase II: Jose Luis Score: 10    Anesthesia Post Evaluation    Patient location during evaluation: PACU  Patient participation: complete - patient participated  Level of consciousness: awake and alert  Pain scale: pain adequately controlled.  Airway patency: patent  Nausea & Vomiting: no nausea and no vomiting  Cardiovascular status: hemodynamically stable  Respiratory status: acceptable  Hydration status: euvolemic  Multimodal analgesia pain management approach  Pain management: adequate    No notable events documented.

## 2025-06-02 ENCOUNTER — RESULTS FOLLOW-UP (OUTPATIENT)
Dept: OBGYN CLINIC | Age: 50
End: 2025-06-02

## 2025-06-05 ENCOUNTER — HOSPITAL ENCOUNTER (OUTPATIENT)
Dept: PHYSICAL THERAPY | Age: 50
Setting detail: RECURRING SERIES
Discharge: HOME | End: 2025-06-08
Attending: OBSTETRICS & GYNECOLOGY
Payer: OTHER GOVERNMENT

## 2025-06-05 DIAGNOSIS — D50.9 IRON DEFICIENCY ANEMIA, UNSPECIFIED IRON DEFICIENCY ANEMIA TYPE: Primary | ICD-10-CM

## 2025-06-05 DIAGNOSIS — D64.9 ANEMIA, UNSPECIFIED TYPE: ICD-10-CM

## 2025-06-05 PROCEDURE — 97530 THERAPEUTIC ACTIVITIES: CPT

## 2025-06-05 PROCEDURE — 97140 MANUAL THERAPY 1/> REGIONS: CPT

## 2025-06-05 PROCEDURE — 97110 THERAPEUTIC EXERCISES: CPT

## 2025-06-05 ASSESSMENT — PAIN SCALES - GENERAL: PAINLEVEL_OUTOF10: 0

## 2025-06-09 ENCOUNTER — OFFICE VISIT (OUTPATIENT)
Dept: OBGYN CLINIC | Age: 50
End: 2025-06-09

## 2025-06-09 VITALS
BODY MASS INDEX: 20.56 KG/M2 | WEIGHT: 131 LBS | DIASTOLIC BLOOD PRESSURE: 72 MMHG | HEIGHT: 67 IN | SYSTOLIC BLOOD PRESSURE: 124 MMHG

## 2025-06-09 DIAGNOSIS — N93.9 ABNORMAL UTERINE BLEEDING (AUB): Primary | ICD-10-CM

## 2025-06-09 DIAGNOSIS — Z09 POSTOP CHECK: ICD-10-CM

## 2025-06-09 PROBLEM — H02.88A MEIBOMIAN GLAND DYSFUNCTION (MGD), BILATERAL, BOTH UPPER AND LOWER LIDS: Status: ACTIVE | Noted: 2025-04-14

## 2025-06-09 PROBLEM — M25.551 PAIN IN RIGHT HIP: Status: ACTIVE | Noted: 2025-04-08

## 2025-06-09 PROBLEM — H02.88B MEIBOMIAN GLAND DYSFUNCTION (MGD), BILATERAL, BOTH UPPER AND LOWER LIDS: Status: ACTIVE | Noted: 2025-04-14

## 2025-06-09 PROBLEM — Z12.31 ENCOUNTER FOR SCREENING MAMMOGRAM FOR MALIGNANT NEOPLASM OF BREAST: Status: ACTIVE | Noted: 2025-04-08

## 2025-06-09 PROBLEM — H04.123 BILATERAL DRY EYES: Status: ACTIVE | Noted: 2025-04-14

## 2025-06-09 PROCEDURE — 99024 POSTOP FOLLOW-UP VISIT: CPT | Performed by: OBSTETRICS & GYNECOLOGY

## 2025-06-09 SDOH — ECONOMIC STABILITY: FOOD INSECURITY: WITHIN THE PAST 12 MONTHS, THE FOOD YOU BOUGHT JUST DIDN'T LAST AND YOU DIDN'T HAVE MONEY TO GET MORE.: NEVER TRUE

## 2025-06-09 SDOH — ECONOMIC STABILITY: FOOD INSECURITY: WITHIN THE PAST 12 MONTHS, YOU WORRIED THAT YOUR FOOD WOULD RUN OUT BEFORE YOU GOT MONEY TO BUY MORE.: NEVER TRUE

## 2025-06-09 NOTE — PROGRESS NOTES
CC:  Postop follow-up      HPI:  Indy presents for [place] [facility] with Novasure ablation on 5/23/25 secondary to HMB    Op findings: Normal cavity  Pathology:  Benign     Patient doing well postop without significant complaints. Voiding without difficulty.  Tolerating regular diet w/out nausea/vomiting; +flatus and bms.  Pain controlled with Ibuprofen only.  Some irregular, light spotting since procedure. Ambulating well.  No issues today.         PE:  /72   Ht 1.702 m (5' 7\")   Wt 59.4 kg (131 lb)   LMP 04/27/2025   BMI 20.52 kg/m²       Constitutional: She appears well-developed and well-nourished. No distress.    HENT:    Head: Normocephalic and atraumatic.    Cardiovascular: RRR  Pulmonary/Chest: CTAB  Ext: No c/c/e    A/P:  Stable Post op condition    RTC in 3 months to recheck on bleeding         Kisha Crawford DO

## 2025-06-11 ENCOUNTER — TELEPHONE (OUTPATIENT)
Dept: ONCOLOGY | Age: 50
End: 2025-06-11

## 2025-06-12 ENCOUNTER — OFFICE VISIT (OUTPATIENT)
Dept: ONCOLOGY | Age: 50
End: 2025-06-12
Payer: OTHER GOVERNMENT

## 2025-06-12 ENCOUNTER — HOSPITAL ENCOUNTER (OUTPATIENT)
Dept: LAB | Age: 50
Discharge: HOME | End: 2025-06-12
Payer: OTHER GOVERNMENT

## 2025-06-12 VITALS
HEART RATE: 74 BPM | HEIGHT: 67 IN | TEMPERATURE: 98.1 F | RESPIRATION RATE: 22 BRPM | SYSTOLIC BLOOD PRESSURE: 109 MMHG | DIASTOLIC BLOOD PRESSURE: 64 MMHG | WEIGHT: 150 LBS | OXYGEN SATURATION: 100 % | BODY MASS INDEX: 23.54 KG/M2

## 2025-06-12 DIAGNOSIS — E55.9 VITAMIN D DEFICIENCY: ICD-10-CM

## 2025-06-12 DIAGNOSIS — D50.9 IRON DEFICIENCY ANEMIA, UNSPECIFIED IRON DEFICIENCY ANEMIA TYPE: ICD-10-CM

## 2025-06-12 DIAGNOSIS — D64.9 ANEMIA, UNSPECIFIED TYPE: ICD-10-CM

## 2025-06-12 DIAGNOSIS — D50.9 IRON DEFICIENCY ANEMIA, UNSPECIFIED IRON DEFICIENCY ANEMIA TYPE: Primary | ICD-10-CM

## 2025-06-12 LAB
25(OH)D3 SERPL-MCNC: 20 NG/ML (ref 30–100)
BASOPHILS # BLD: 0.05 K/UL (ref 0–0.2)
BASOPHILS NFR BLD: 2.2 % (ref 0–2)
DIFFERENTIAL METHOD BLD: ABNORMAL
EOSINOPHIL # BLD: 0.06 K/UL (ref 0–0.8)
EOSINOPHIL NFR BLD: 2.6 % (ref 0.5–7.8)
ERYTHROCYTE [DISTWIDTH] IN BLOOD BY AUTOMATED COUNT: 13.4 % (ref 11.9–14.6)
FERRITIN SERPL-MCNC: 45 NG/ML (ref 8–388)
FOLATE SERPL-MCNC: 11.3 NG/ML (ref 3.1–17.5)
HCT VFR BLD AUTO: 39.2 % (ref 35.8–46.3)
HGB BLD-MCNC: 12.5 G/DL (ref 11.7–15.4)
IMM GRANULOCYTES # BLD AUTO: 0 K/UL (ref 0–0.5)
IMM GRANULOCYTES NFR BLD AUTO: 0 % (ref 0–5)
IRON SATN MFR SERPL: 66 % (ref 20–50)
IRON SERPL-MCNC: 228 UG/DL (ref 35–100)
LYMPHOCYTES # BLD: 1.06 K/UL (ref 0.5–4.6)
LYMPHOCYTES NFR BLD: 46.3 % (ref 13–44)
MCH RBC QN AUTO: 29.5 PG (ref 26.1–32.9)
MCHC RBC AUTO-ENTMCNC: 31.9 G/DL (ref 31.4–35)
MCV RBC AUTO: 92.5 FL (ref 82–102)
MONOCYTES # BLD: 0.21 K/UL (ref 0.1–1.3)
MONOCYTES NFR BLD: 9.2 % (ref 4–12)
NEUTS SEG # BLD: 0.91 K/UL (ref 1.7–8.2)
NEUTS SEG NFR BLD: 39.7 % (ref 43–78)
NRBC # BLD: 0 K/UL (ref 0–0.2)
PLATELET # BLD AUTO: 220 K/UL (ref 150–450)
PMV BLD AUTO: 9.1 FL (ref 9.4–12.3)
RBC # BLD AUTO: 4.24 M/UL (ref 4.05–5.2)
TIBC SERPL-MCNC: 348 UG/DL (ref 240–450)
UIBC SERPL-MCNC: 120 UG/DL (ref 112–347)
VIT B12 SERPL-MCNC: 884 PG/ML (ref 193–986)
WBC # BLD AUTO: 2.3 K/UL (ref 4.3–11.1)

## 2025-06-12 PROCEDURE — 83550 IRON BINDING TEST: CPT

## 2025-06-12 PROCEDURE — 82728 ASSAY OF FERRITIN: CPT

## 2025-06-12 PROCEDURE — 85025 COMPLETE CBC W/AUTO DIFF WBC: CPT

## 2025-06-12 PROCEDURE — 83540 ASSAY OF IRON: CPT

## 2025-06-12 PROCEDURE — 99214 OFFICE O/P EST MOD 30 MIN: CPT | Performed by: NURSE PRACTITIONER

## 2025-06-12 PROCEDURE — 82306 VITAMIN D 25 HYDROXY: CPT

## 2025-06-12 PROCEDURE — 82746 ASSAY OF FOLIC ACID SERUM: CPT

## 2025-06-12 PROCEDURE — 82607 VITAMIN B-12: CPT

## 2025-06-12 PROCEDURE — 36415 COLL VENOUS BLD VENIPUNCTURE: CPT

## 2025-06-12 ASSESSMENT — ENCOUNTER SYMPTOMS
CONSTIPATION: 0
HEMOPTYSIS: 0
NAUSEA: 0
SCLERAL ICTERUS: 0
SHORTNESS OF BREATH: 0
CHEST TIGHTNESS: 0
WHEEZING: 0
SORE THROAT: 0
BLOOD IN STOOL: 0
TROUBLE SWALLOWING: 0
ABDOMINAL PAIN: 0
VOICE CHANGE: 0
DIARRHEA: 0
ABDOMINAL DISTENTION: 0
VOMITING: 0

## 2025-06-12 NOTE — PROGRESS NOTES
On exam she does have a thick white discharge in the vault and wet     Systemic lupus erythematosus (HCC)      Past Surgical History:   Procedure Laterality Date     SECTION      x 2      SECTION N/A 2022     SECTION performed by Koffi Julien MD at [facility] L&D    DILATION AND CURETTAGE OF UTERUS N/A 2025    HYSTEROSCOPY DILATATION CURETTAGE WITH NOVASURE ENDOMETRIAL ABLATION performed by Kisha Crawford DO at [facility] MAIN OR    LAP,TUBAL CAUTERY      MYOMECTOMY      laparoscopic    OTHER SURGICAL HISTORY      tubal reversal     WISDOM TOOTH EXTRACTION       Current Outpatient Medications   Medication Sig Dispense Refill    ibuprofen (ADVIL;MOTRIN) 800 MG tablet Take 1 tablet by mouth 2 times daily as needed for Pain 180 tablet 1    Multiple Vitamin (MULTIVITAMIN ADULT PO) Take 1 tablet by mouth daily      SODIUM FLUORIDE, DENTAL GEL, (SODIUM FLUORIDE 5000 PLUS) 1.1 % CREA USE IN PLACE OF REGULAR TOOTHPASTE TWICE DAILY      ferrous sulfate (IRON 325) 325 (65 Fe) MG tablet Take 1 tablet by mouth three times a week      Biotin w/ Vitamins C & E (HAIR/SKIN/NAILS PO) Take by mouth daily      TRIUMEQ 600- MG TABS Take 1 tablet by mouth every evening      diclofenac (VOLTAREN) 75 MG EC tablet Take 1 tablet by mouth (Patient not taking: Reported on 2025)       No current facility-administered medications for this visit.     OBJECTIVE:  /64   Pulse 74   Temp 98.1 °F (36.7 °C) (Oral)   Resp 22   Ht 1.702 m (5' 7\")   Wt 68 kg (150 lb)   LMP 2025 (Approximate)   SpO2 100%   Breastfeeding No   BMI 23.49 kg/m²     ECOG PERFORMANCE STATUS - 0-Fully active, able to carry on all pre-disease performance without restriction.  Pain - 0 - No pain/10. None/Minimal pain - not affecting QOL     Physical Exam  Vitals reviewed.   Constitutional:       General: She is not in acute distress.     Appearance: Normal appearance. She is normal weight. She is not ill-appearing

## 2025-06-13 ENCOUNTER — HOSPITAL ENCOUNTER (OUTPATIENT)
Dept: PHYSICAL THERAPY | Age: 50
Setting detail: RECURRING SERIES
Discharge: HOME | End: 2025-06-16
Attending: OBSTETRICS & GYNECOLOGY
Payer: OTHER GOVERNMENT

## 2025-06-13 DIAGNOSIS — M25.651 STIFFNESS OF RIGHT HIP, NOT ELSEWHERE CLASSIFIED: ICD-10-CM

## 2025-06-13 DIAGNOSIS — M25.551 PAIN IN RIGHT HIP: Primary | ICD-10-CM

## 2025-06-13 DIAGNOSIS — M25.851 RIGHT HIP IMPINGEMENT SYNDROME: ICD-10-CM

## 2025-06-13 DIAGNOSIS — M16.11 PRIMARY OSTEOARTHRITIS OF RIGHT HIP: ICD-10-CM

## 2025-06-13 PROCEDURE — 97161 PT EVAL LOW COMPLEX 20 MIN: CPT

## 2025-06-13 PROCEDURE — 97110 THERAPEUTIC EXERCISES: CPT

## 2025-06-13 ASSESSMENT — PAIN SCALES - GENERAL: PAINLEVEL_OUTOF10: 2

## 2025-06-13 NOTE — THERAPY EVALUATION
Indy Baer Chauncey  : 1975  Primary:  [facility] (Other)  Secondary:  [place] [address]  [address]  [facility] [address]  Phone: [phone]  Fax: [phone] Plan Frequency: 2 x week for 3-4 weeks    Plan of Care/Certification Expiration Date: 25        Plan of Care/Certification Expiration Date:  Plan of Care/Certification Expiration Date: 25    Frequency/Duration: Plan Frequency: 2 x week for 3-4 weeks      Time In/Out:   Time In: 1105  Time Out: 1145      PT Visit Info:         Visit Count:  1                OUTPATIENT PHYSICAL THERAPY:             Initial Assessment 2025               Episode (Right hip impingement)         Treatment Diagnosis:     Pain in right hip  Stiffness of right hip, not elsewhere classified  Primary osteoarthritis of right hip  Right hip impingement syndrome  Medical/Referring Diagnosis:    Other specified joint disorders, right hip [M25.851]  Pain in right hip [M25.551]      Referring Physician:  Iván Mcclelland MD MD Orders:  PT Eval and Treat   Return MD Appt:  TBD  Date of Onset:  Onset Date: 25     Allergies:  Patient has no known allergies.  Restrictions/Precautions:    None      Medications Last Reviewed: 2025     SUBJECTIVE   History of Injury/Illness (Reason for Referral):  Patient is  50/y/o female with complaints of right hip pain since February with c/o stiffness noting she had been sleeping on a love sac, and also falling over a box in January;  though right hip pain resolved over time.  Patient currently complains of limited hip mobility, stiffness after sitting for > 1 hour;  no prior injury did. She states that the stiffness is improved after walking and moving the hip for a few minutes. Patient recently has been trying to walk on the treadmill for 30 minutes and when she is doing this she has no trouble with her hip. Denies any numbness or tingling of the leg.  Patient

## 2025-06-16 NOTE — PROGRESS NOTES
Indy Buffybritt Grossman  : 1975  Primary:  [facility] (Other)  Secondary:  [place] [address]  [address]  [facility] [address]  Phone: [phone]  Fax: [phone] Plan Frequency: 2 x week for 3-4 weeks    Plan of Care/Certification Expiration Date: 25        Plan of Care/Certification Expiration Date:  Plan of Care/Certification Expiration Date: 25    Frequency/Duration: Plan Frequency: 2 x week for 3-4 weeks      Time In/Out:   Time In: 1105  Time Out: 1145      PT Visit Info:         Visit Count:  1    OUTPATIENT PHYSICAL THERAPY:   Treatment Note 2025       Episode  (Right hip impingement)               Treatment Diagnosis:    Pain in right hip  Stiffness of right hip, not elsewhere classified  Primary osteoarthritis of right hip  Right hip impingement syndrome  Medical/Referring Diagnosis:    Other specified joint disorders, right hip [M25.851]  Pain in right hip [M25.551]      Referring Physician:  Iván Mcclelland MD MD Orders:  PT Eval and Treat   Return MD Appt:  TBD   Date of Onset:  Onset Date: 25     Allergies:   Patient has no known allergies.  Restrictions/Precautions:   None      Interventions Planned (Treatment may consist of any combination of the following):     See Assessment Note    Subjective Comments:    Patient currently complains of limited hip mobility, stiffness after sitting for > 1 hour;   Initial Pain Level:     2/10  Post Session Pain Level:      2/10  Medications Last Reviewed: 2025  Updated Objective Findings:  See Evaluation Note from today  Treatment   THERAPEUTIC EXERCISE: (12 minutes):    Exercises per grid below to improve mobility and strength.  Required minimal verbal cues to promote proper body alignment.  Progressed range as indicated.   Date:  25 Date:   Date:     Activity/Exercise Parameters Parameters Parameters   Patient edcuation/HEP Selam/phys of hip DDD, discussed POC

## 2025-06-25 ENCOUNTER — HOSPITAL ENCOUNTER (OUTPATIENT)
Dept: PHYSICAL THERAPY | Age: 50
Setting detail: RECURRING SERIES
Discharge: HOME | End: 2025-06-28
Attending: OBSTETRICS & GYNECOLOGY
Payer: OTHER GOVERNMENT

## 2025-06-25 PROCEDURE — 97110 THERAPEUTIC EXERCISES: CPT

## 2025-06-25 ASSESSMENT — PAIN SCALES - GENERAL: PAINLEVEL_OUTOF10: 1

## 2025-06-25 NOTE — PROGRESS NOTES
[facility]   9/19/2025  1:30 PM Laura Goldberg MD [facility]

## 2025-06-26 ENCOUNTER — HOSPITAL ENCOUNTER (OUTPATIENT)
Dept: PHYSICAL THERAPY | Age: 50
Setting detail: RECURRING SERIES
Discharge: HOME | End: 2025-06-29
Attending: OBSTETRICS & GYNECOLOGY
Payer: OTHER GOVERNMENT

## 2025-06-26 PROCEDURE — 97110 THERAPEUTIC EXERCISES: CPT

## 2025-06-26 ASSESSMENT — PAIN SCALES - GENERAL
PAINLEVEL_OUTOF10: 0
PAINLEVEL_OUTOF10: 0

## 2025-06-26 NOTE — PROGRESS NOTES
Indy Buffybritt Grossman  : 1975  Primary:  [facility] (Other)  Secondary:  [place] [address]  [address]  [facility] [address]  Phone: [phone]  Fax: [phone] Plan Frequency: once every other week for  90 days    Plan of Care/Certification Expiration Date: 25        Plan of Care/Certification Expiration Date:  Plan of Care/Certification Expiration Date: 25    Frequency/Duration: Plan Frequency: once every other week for  90 days      Time In/Out:   Time In: 0900  Time Out: 0945      PT Visit Info:    Total # of Visits Approved: 0 (med nec)  Total # of Visits to Date: 4      Visit Count:  4    OUTPATIENT PHYSICAL THERAPY:   Treatment Note 2025       Episode  (PFPT)               Treatment Diagnosis:    Pain in right hip  Stiffness of right hip, not elsewhere classified  Primary osteoarthritis of right hip  Right hip impingement syndrome  Medical/Referring Diagnosis:    Other specified joint disorders, right hip [M25.851]  Pain in right hip [M25.551]      Referring Physician:  Iván Mcclelland MD MD Orders:  PT Eval and Treat   Return MD Appt:  TBD   Date of Onset:  Onset Date: 25     Allergies:   Patient has no known allergies.  Restrictions/Precautions:   None      Interventions Planned (Treatment may consist of any combination of the following):     See Assessment Note    Subjective Comments:    Patient currently complains of limited hip mobility, stiffness after sitting for > 1 hour;            25  Reports feeling fatigued after session yesterday , but is pain free this morning  Initial Pain Level:     0/10  Post Session Pain Level:      0/10  Medications Last Reviewed: 2025  Updated Objective Findings:  None Today  Treatment   THERAPEUTIC EXERCISE: (43 minutes):    Exercises per grid below to improve mobility and strength.  Required minimal verbal cues to promote proper body alignment.  Progressed range as

## 2025-07-02 ENCOUNTER — HOSPITAL ENCOUNTER (OUTPATIENT)
Dept: PHYSICAL THERAPY | Age: 50
Setting detail: RECURRING SERIES
Discharge: HOME | End: 2025-07-05
Attending: OBSTETRICS & GYNECOLOGY
Payer: OTHER GOVERNMENT

## 2025-07-02 PROCEDURE — 97110 THERAPEUTIC EXERCISES: CPT

## 2025-07-02 ASSESSMENT — PAIN SCALES - GENERAL: PAINLEVEL_OUTOF10: 0

## 2025-07-02 NOTE — PROGRESS NOTES
Indy Buffybritt Grossman  : 1975  Primary:  [facility] (Other)  Secondary:  [place] [address]  [address]  [facility] [address]  Phone: [phone]  Fax: [phone] Plan Frequency: once every other week for  90 days    Plan of Care/Certification Expiration Date: 25        Plan of Care/Certification Expiration Date:  Plan of Care/Certification Expiration Date: 25    Frequency/Duration: Plan Frequency: once every other week for  90 days      Time In/Out:   Time In: 0815  Time Out: 0900      PT Visit Info:    Total # of Visits Approved: 0 (med nec)  Total # of Visits to Date: 5      Visit Count:  5    OUTPATIENT PHYSICAL THERAPY:   Treatment Note 2025       Episode  (PFPT)               Treatment Diagnosis:    Pain in right hip  Stiffness of right hip, not elsewhere classified  Primary osteoarthritis of right hip  Right hip impingement syndrome  Medical/Referring Diagnosis:    Other specified joint disorders, right hip [M25.851]  Pain in right hip [M25.551]      Referring Physician:  Iván Mcclelland MD MD Orders:  PT Eval and Treat   Return MD Appt:  TBD   Date of Onset:  Onset Date: 25     Allergies:   Patient has no known allergies.  Restrictions/Precautions:   None      Interventions Planned (Treatment may consist of any combination of the following):     See Assessment Note    Subjective Comments:    Patient currently complains of limited hip mobility, stiffness after sitting for > 1 hour;            25  Reports doing well with regards to her hip  Initial Pain Level:     0/10  Post Session Pain Level:      0/10  Medications Last Reviewed: 2025  Updated Objective Findings:  None Today  Treatment   MANUAL THERAPY: (5 minutes):   Joint mobilization was utilized and necessary because of the patient's restricted joint motion.   Right hip lat, inferior and LAT grade III-IV     THERAPEUTIC EXERCISE: (38 minutes):    Exercises per grid

## 2025-07-03 ENCOUNTER — HOSPITAL ENCOUNTER (OUTPATIENT)
Dept: PHYSICAL THERAPY | Age: 50
Setting detail: RECURRING SERIES
Discharge: HOME | End: 2025-07-06
Attending: OBSTETRICS & GYNECOLOGY
Payer: OTHER GOVERNMENT

## 2025-07-03 PROCEDURE — 97110 THERAPEUTIC EXERCISES: CPT

## 2025-07-03 ASSESSMENT — PAIN SCALES - GENERAL: PAINLEVEL_OUTOF10: 0

## 2025-07-03 NOTE — PROGRESS NOTES
weekly - 3 sets - 10 reps  - Child's Pose Knees Apart and Hands Forward   - 1 x daily - 7 x weekly - 1 sets - 10 reps - 3-5 hold  - Prone Press Up  - 1 x daily - 7 x weekly - 1 sets - 10 reps - 3-5 hold  - Beginner Front Arm Support  - 1 x daily - 7 x weekly - 3 sets - 5 reps - 2 hold  - Supine Figure 4 Piriformis Stretch  - 1 x daily - 7 x weekly - 3 sets - 10 reps  - Supine Figure 4 Piriformis Stretch  - 1 x daily - 7 x weekly - 1 sets - 3 reps - 20 hold    - Beginner Reverse Clamshell  - 1 x daily - 7 x weekly - 1 sets - 10 reps - 2 hold  Treatment/Session Summary:    Treatment Assessment:   Patient tolerated treatment well and progression of dynamic exercises  Communication/Consultation:  None today  Equipment provided today:  HEP and Theraband  Recommendations/Intent for next treatment session: Next visit will focus on manual therapy, ROM, hip and core strengthening exercise, HEP.    >Total Treatment Billable Duration:  43 minutes (43 minutes TE)  Time In: 0900  Time Out: 0945     Francisco Valdez, PT         Charge Capture  Events  CarePayment Portal  Appt Desk  Attendance Report     Future Appointments   Date Time Provider Department Center   7/23/2025 12:30 PM Denice Galdamez MD [facility] AMB   9/15/2025  9:15 AM Kisha Crawford, DO [facility] 1 [facility] AMB   9/19/2025 12:40 PM [facility]   9/19/2025  1:30 PM Laura Goldberg MD [facility]

## 2025-07-09 PROBLEM — Z12.31 ENCOUNTER FOR SCREENING MAMMOGRAM FOR MALIGNANT NEOPLASM OF BREAST: Status: RESOLVED | Noted: 2025-04-08 | Resolved: 2025-07-09

## 2025-07-11 DIAGNOSIS — R89.4 SEROLOGIC ABNORMALITY: ICD-10-CM

## 2025-07-11 DIAGNOSIS — D50.9 IRON DEFICIENCY ANEMIA, UNSPECIFIED IRON DEFICIENCY ANEMIA TYPE: ICD-10-CM

## 2025-07-11 DIAGNOSIS — O09.523 HIGH RISK PREGNANCY, MULTIGRAVIDA OF ADVANCED MATERNAL AGE IN THIRD TRIMESTER: ICD-10-CM

## 2025-07-11 DIAGNOSIS — M32.8 OTHER FORMS OF SYSTEMIC LUPUS ERYTHEMATOSUS, UNSPECIFIED ORGAN INVOLVEMENT STATUS (HCC): ICD-10-CM

## 2025-07-11 DIAGNOSIS — O98.712 HIV DISEASE AFFECTING PREGNANCY IN SECOND TRIMESTER: ICD-10-CM

## 2025-07-11 DIAGNOSIS — E55.9 HYPOVITAMINOSIS D: ICD-10-CM

## 2025-07-11 LAB
ALBUMIN SERPL-MCNC: 4 G/DL (ref 3.5–5)
ALBUMIN/GLOB SERPL: 1 (ref 1–1.9)
ALP SERPL-CCNC: 58 U/L (ref 35–104)
ALT SERPL-CCNC: 20 U/L (ref 8–45)
ANION GAP SERPL CALC-SCNC: 10 MMOL/L (ref 7–16)
APPEARANCE UR: NORMAL
AST SERPL-CCNC: 17 U/L (ref 15–37)
BACTERIA URNS QL MICRO: NEGATIVE /HPF
BILIRUB SERPL-MCNC: 0.4 MG/DL (ref 0–1.2)
BILIRUB UR QL: NEGATIVE
BUN SERPL-MCNC: 12 MG/DL (ref 6–23)
CALCIUM SERPL-MCNC: 9.4 MG/DL (ref 8.8–10.2)
CASTS URNS QL MICRO: 0 /LPF
CHLORIDE SERPL-SCNC: 103 MMOL/L (ref 98–107)
CO2 SERPL-SCNC: 26 MMOL/L (ref 20–29)
COLOR UR: NORMAL
CREAT SERPL-MCNC: 0.92 MG/DL (ref 0.6–1.1)
CRYSTALS URNS QL MICRO: 0 /LPF
EPI CELLS #/AREA URNS HPF: NORMAL /HPF (ref 0–5)
ERYTHROCYTE [DISTWIDTH] IN BLOOD BY AUTOMATED COUNT: 13.7 % (ref 11.9–14.6)
ERYTHROCYTE [SEDIMENTATION RATE] IN BLOOD: 14 MM/HR (ref 0–20)
GLOBULIN SER CALC-MCNC: 4 G/DL (ref 2.3–3.5)
GLUCOSE SERPL-MCNC: 87 MG/DL (ref 70–99)
GLUCOSE UR STRIP.AUTO-MCNC: NEGATIVE MG/DL
HCT VFR BLD AUTO: 41.1 % (ref 35.8–46.3)
HGB BLD-MCNC: 12.8 G/DL (ref 11.7–15.4)
HGB UR QL STRIP: NEGATIVE
HYALINE CASTS URNS QL MICRO: NORMAL /LPF
KETONES UR QL STRIP.AUTO: NEGATIVE MG/DL
LEUKOCYTE ESTERASE UR QL STRIP.AUTO: NEGATIVE
MCH RBC QN AUTO: 29.6 PG (ref 26.1–32.9)
MCHC RBC AUTO-ENTMCNC: 31.1 G/DL (ref 31.4–35)
MCV RBC AUTO: 95.1 FL (ref 82–102)
MUCOUS THREADS URNS QL MICRO: 0 /LPF
NITRITE UR QL STRIP.AUTO: NEGATIVE
NRBC # BLD: 0 K/UL (ref 0–0.2)
PH UR STRIP: 5 (ref 5–9)
PLATELET # BLD AUTO: 263 K/UL (ref 150–450)
PMV BLD AUTO: 10.8 FL (ref 9.4–12.3)
POTASSIUM SERPL-SCNC: 4.3 MMOL/L (ref 3.5–5.1)
PROT SERPL-MCNC: 8 G/DL (ref 6.3–8.2)
PROT UR STRIP-MCNC: NEGATIVE MG/DL
RBC # BLD AUTO: 4.32 M/UL (ref 4.05–5.2)
RBC #/AREA URNS HPF: NORMAL /HPF (ref 0–5)
SODIUM SERPL-SCNC: 139 MMOL/L (ref 136–145)
SP GR UR REFRACTOMETRY: 1.02 (ref 1–1.02)
URINE CULTURE IF INDICATED: NORMAL
UROBILINOGEN UR QL STRIP.AUTO: 0.2 EU/DL (ref 0.2–1)
WBC # BLD AUTO: 2.2 K/UL (ref 4.3–11.1)
WBC URNS QL MICRO: NORMAL /HPF (ref 0–4)

## 2025-07-12 LAB
APTT SCREEN TO CONFIRM RATIO: 0.96 RATIO (ref 0–1.34)
B2 GLYCOPROT1 IGG SER-ACNC: <9 GPI IGG UNITS (ref 0–20)
B2 GLYCOPROT1 IGM SER-ACNC: <9 GPI IGM UNITS (ref 0–32)
CENTROMERE B AB SER-ACNC: <0.2 AI (ref 0–0.9)
CHROMATIN AB SERPL-ACNC: <0.2 AI (ref 0–0.9)
CONFIRM APTT/NORMAL: 33.2 SEC (ref 0–47.6)
DSDNA AB SER-ACNC: 2 IU/ML (ref 0–9)
ENA JO1 AB SER-ACNC: <0.2 AI (ref 0–0.9)
ENA RNP AB SER-ACNC: 2.8 AI (ref 0–0.9)
ENA SCL70 AB SER-ACNC: <0.2 AI (ref 0–0.9)
ENA SM AB SER-ACNC: <0.2 AI (ref 0–0.9)
ENA SS-A AB SER-ACNC: 2.9 AI (ref 0–0.9)
ENA SS-B AB SER-ACNC: >8 AI (ref 0–0.9)
LA 2 SCREEN W REFLEX-IMP: NORMAL
Lab: ABNORMAL
SCREEN APTT: 37.4 SEC (ref 0–43.5)
SCREEN DRVVT: 29.3 SEC (ref 0–47)
THROMBIN TIME: 20.5 SEC (ref 0–23)

## 2025-07-13 LAB
C3 SERPL-MCNC: 104 MG/DL (ref 82–167)
C4 SERPL-MCNC: 12 MG/DL (ref 12–38)

## 2025-07-14 LAB
CCP IGA+IGG SERPL IA-ACNC: 7 UNITS (ref 0–19)
RHEUMATOID FACT SER QL LA: NEGATIVE

## 2025-07-18 NOTE — PROGRESS NOTES
Subjective:      HPI:        Permanent history    Mrs. Grossman is a very pleasant 50-year-old -American lady with past medical history significant for iron deficiency anemia, high risk pregnancy, HIV status, questionable lupus who presents for evaluation of abnormal serological markers.    Patient currently pregnant with her third child via IVF and donor egg and follows high risk OB.  Patient seen by hematology for iron deficiency anemia.  Patient also followed by [facility] per which she takes Epzicom, Prezista, and Norvir daily for her HIV status.    Patient has 2 other children - 1st child natural-16yrs, 2nd child was ivf-her egg 6yrs, child now, egg donor but  sperm.   While going to Ob/Gyn, iron was low, see Internal fetal med, OB is Dr [address]REA Rueda rd. OB sent to Dr Goldberg fro Anemia, Dr Goldberg done labs, some were abnormal.  Patient was told many years ago she has lupus markers, given high risk pregnancy these were also checked recently and mildly abnormal thus here for evaluation.    Pt has one brother younger-healthy. Mom,Dab,Brother no auto immune. Mom no miscarriages or trouble. Pt has miscarriage 2 yrs ago- ivf preg her egg, and did another donor egg, pror no miscarriage. No fam hx of thyroid. Pt has over active thyroid,  seen [facility] ,  in , was on meds for thyroid over 5 yrs ago, doent take now. HIV 2003-on antivirals.     Other 2 kids healthy. 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      Since Last Visit:  Pt here for 1 year follow up.  Labs in [place]. Pt seen Ortho 5/22/25 in [place] for Rt hip and has been doing PT.  Pt seen Gyn 6/9/25 for post op appt for HSC/DC with Novasure ablation on 5/23/25 secondary to HMB  in epic.  Pt  last seen Hem 6/12/25 in epic. Pt states she is doing well, no joint pain or swelling to report.       ROS:     Musculoskeletal ROS:   .    Normal  .    AM stiffness

## 2025-07-21 ENCOUNTER — OFFICE VISIT (OUTPATIENT)
Dept: RHEUMATOLOGY | Age: 50
End: 2025-07-21
Payer: OTHER GOVERNMENT

## 2025-07-21 VITALS
HEIGHT: 67 IN | SYSTOLIC BLOOD PRESSURE: 133 MMHG | BODY MASS INDEX: 23.54 KG/M2 | HEART RATE: 64 BPM | DIASTOLIC BLOOD PRESSURE: 85 MMHG | WEIGHT: 150 LBS

## 2025-07-21 DIAGNOSIS — M32.8 OTHER FORMS OF SYSTEMIC LUPUS ERYTHEMATOSUS, UNSPECIFIED ORGAN INVOLVEMENT STATUS (HCC): Primary | ICD-10-CM

## 2025-07-21 DIAGNOSIS — R89.4 SEROLOGIC ABNORMALITY: ICD-10-CM

## 2025-07-21 DIAGNOSIS — E55.9 HYPOVITAMINOSIS D: ICD-10-CM

## 2025-07-21 DIAGNOSIS — D50.9 IRON DEFICIENCY ANEMIA, UNSPECIFIED IRON DEFICIENCY ANEMIA TYPE: ICD-10-CM

## 2025-07-21 DIAGNOSIS — Z21 ASYMPTOMATIC HIV INFECTION, WITH NO HISTORY OF HIV-RELATED ILLNESS (HCC): ICD-10-CM

## 2025-07-21 PROCEDURE — 99214 OFFICE O/P EST MOD 30 MIN: CPT | Performed by: INTERNAL MEDICINE

## 2025-07-21 RX ORDER — ERGOCALCIFEROL 1.25 MG/1
CAPSULE, LIQUID FILLED ORAL
Qty: 12 CAPSULE | Refills: 0 | Status: SHIPPED | OUTPATIENT
Start: 2025-07-21

## 2025-07-21 NOTE — PATIENT INSTRUCTIONS
Labs - ruby, lupus panel, cbc, cmp, esr, c3, c4, UA, APLS antibody panel  Monitor if any sx -and call us if any issues  Vitamin D 50,000 units once weekly for 3 months / 12 weeks then back to the 2000 units daily thereafter  RTC in 12 months with labs    1YR,Labs BS prior

## (undated) DEVICE — PENCIL ES L3M BTTN SWCH S STL HEX LOK BLDE ELECTRD HOLSTER

## (undated) DEVICE — MINOR LITHOTOMY PACK: Brand: MEDLINE INDUSTRIES, INC.

## (undated) DEVICE — CANISTER, RIGID, 2000CC: Brand: MEDLINE INDUSTRIES, INC.

## (undated) DEVICE — AGENT HEMSTAT 3GM OXIDIZED REGENERATED CELOS ABSRB FOR CONT (ORDER MULTIPLES OF 5EA)

## (undated) DEVICE — Device: Brand: PORTEX

## (undated) DEVICE — SUTURE VCRL RAPIDE SZ 3-0 L36IN ABSRB UD L36MM CT-1 1/2 CIR VR944

## (undated) DEVICE — HANDPIECE ABLAT DIA6MM ENDOMET IMPED CTRL DEV DISP NOVASURE

## (undated) DEVICE — AMD ANTIMICROBIAL GAUZE SPONGES,12 PLY USP TYPE VII, 0.2% POLYHEXAMETHYLENE BIGUANIDE HCI (PHMB): Brand: CURITY

## (undated) DEVICE — STERILE POLYISOPRENE POWDER-FREE SURGICAL GLOVES: Brand: PROTEXIS

## (undated) DEVICE — SUTURE MCRYL SZ 4-0 L27IN ABSRB UD L19MM PS-2 1/2 CIR PRIM Y426H

## (undated) DEVICE — TUBING, SUCTION, 1/4" X 10', STRAIGHT: Brand: MEDLINE

## (undated) DEVICE — DRAPE,UNDERBUTTOCKS,PCH,STERILE: Brand: MEDLINE

## (undated) DEVICE — SOLUTION IRRIG 1000ML H2O STRL BLT

## (undated) DEVICE — SUTURE PDS II SZ 0 L27IN ABSRB VLT L36MM CT-1 1/2 CIR Z340H

## (undated) DEVICE — KENDALL SCD EXPRESS SLEEVES, KNEE LENGTH, MEDIUM: Brand: KENDALL SCD

## (undated) DEVICE — SUTURE VCRL SZ 0 L36IN ABSRB UD L36MM CT-1 1/2 CIR J946H

## (undated) DEVICE — SUTURE MCRYL SZ 3-0 L27IN ABSRB UD L60MM KS STR REV CUT Y523H

## (undated) DEVICE — SOLUTION IRRIG 3000ML 0.9% SOD CHL USP UROMATIC PLAS CONT

## (undated) DEVICE — SOLUTION IRRIG 1000ML H2O PIC PLAS SHATTERPROOF CONTAINER

## (undated) DEVICE — BARRIER ADH W3XL4IN UTER PELV ABSRB GYNECARE INTCEED

## (undated) DEVICE — BASIC SINGLE BASIN 2-LF: Brand: MEDLINE INDUSTRIES, INC.

## (undated) DEVICE — CARDINAL HEALTH FLEXIBLE LIGHT HANDLE COVER: Brand: CARDINAL HEALTH

## (undated) DEVICE — SURGICAL PROCEDURE PACK C SECT CDS

## (undated) DEVICE — SOLUTION IV 1000ML 0.9% SOD CHL

## (undated) DEVICE — CATH TRAY URETHRAL 16FR CLOSED COLLECTION BAG

## (undated) DEVICE — GLOVE ORANGE PI 7   MSG9070

## (undated) DEVICE — ELECTRODE PT RET AD L9FT HI MOIST COND ADH HYDRGEL CORDED

## (undated) DEVICE — STAPLER SKIN SQ 30 ABSRB STPL DISP INSORB

## (undated) DEVICE — TRAY PREP DRY W/ PREM GLV 2 APPL 6 SPNG 2 UNDPD 1 OVERWRAP

## (undated) DEVICE — PENCIL SMK EVAC TELSCP 4.5 M TBNG

## (undated) DEVICE — 3M™ STERI-STRIP™ REINFORCED ADHESIVE SKIN CLOSURES, R1547, 1/2 IN X 4 IN (12 MM X 100 MM), 6 STRIPS/ENVELOPE: Brand: 3M™ STERI-STRIP™

## (undated) DEVICE — AMD ANTIMICROBIAL NON-ADHERENT PAD,0.2% POLYHEXAMETHYLENE BIGUANIDE HCI (PHMB): Brand: TELFA

## (undated) DEVICE — GLOVE SURG SZ 65 THK91MIL LTX FREE SYN POLYISOPRENE